# Patient Record
Sex: FEMALE | Race: WHITE | Employment: FULL TIME | URBAN - METROPOLITAN AREA
[De-identification: names, ages, dates, MRNs, and addresses within clinical notes are randomized per-mention and may not be internally consistent; named-entity substitution may affect disease eponyms.]

---

## 2017-02-04 LAB
AVERAGE GLUCOSE: NORMAL
CREATININE, URINE: NORMAL
HBA1C MFR BLD: 9.2 %
MICROALBUMIN/CREAT 24H UR: NORMAL MG/G{CREAT}
MICROALBUMIN/CREAT UR-RTO: NORMAL

## 2017-02-10 ENCOUNTER — OFFICE VISIT (OUTPATIENT)
Dept: PRIMARY CARE CLINIC | Age: 47
End: 2017-02-10

## 2017-02-10 VITALS
OXYGEN SATURATION: 97 % | BODY MASS INDEX: 51.91 KG/M2 | TEMPERATURE: 97.2 F | WEIGHT: 293 LBS | RESPIRATION RATE: 20 BRPM | HEART RATE: 102 BPM | HEIGHT: 63 IN | DIASTOLIC BLOOD PRESSURE: 80 MMHG | SYSTOLIC BLOOD PRESSURE: 150 MMHG

## 2017-02-10 DIAGNOSIS — J02.0 ACUTE STREPTOCOCCAL PHARYNGITIS: ICD-10-CM

## 2017-02-10 DIAGNOSIS — J30.2 SEASONAL ALLERGIC RHINITIS, UNSPECIFIED ALLERGIC RHINITIS TRIGGER: ICD-10-CM

## 2017-02-10 DIAGNOSIS — K58.2 IRRITABLE BOWEL SYNDROME WITH BOTH CONSTIPATION AND DIARRHEA: ICD-10-CM

## 2017-02-10 LAB — GLUCOSE BLD-MCNC: 345 MG/DL

## 2017-02-10 PROCEDURE — 99203 OFFICE O/P NEW LOW 30 MIN: CPT | Performed by: FAMILY MEDICINE

## 2017-02-10 PROCEDURE — 82962 GLUCOSE BLOOD TEST: CPT | Performed by: FAMILY MEDICINE

## 2017-02-10 RX ORDER — CELECOXIB 200 MG/1
CAPSULE ORAL
Refills: 1 | COMMUNITY
Start: 2016-12-15 | End: 2017-02-10 | Stop reason: SDUPTHER

## 2017-02-10 RX ORDER — DICYCLOMINE HYDROCHLORIDE 10 MG/1
10 CAPSULE ORAL 4 TIMES DAILY
Qty: 120 CAPSULE | Refills: 1 | Status: SHIPPED | OUTPATIENT
Start: 2017-02-10 | End: 2017-09-08 | Stop reason: SDUPTHER

## 2017-02-10 RX ORDER — FLUTICASONE PROPIONATE 50 MCG
1 SPRAY, SUSPENSION (ML) NASAL DAILY
Qty: 1 BOTTLE | Refills: 3 | Status: SHIPPED | OUTPATIENT
Start: 2017-02-10 | End: 2017-09-08 | Stop reason: SDUPTHER

## 2017-02-10 RX ORDER — GLIMEPIRIDE 2 MG/1
TABLET ORAL
Qty: 180 TABLET | Refills: 1 | Status: SHIPPED | OUTPATIENT
Start: 2017-02-10 | End: 2017-09-08 | Stop reason: SDUPTHER

## 2017-02-10 RX ORDER — CELECOXIB 200 MG/1
CAPSULE ORAL
Qty: 90 CAPSULE | Refills: 1 | Status: SHIPPED | OUTPATIENT
Start: 2017-02-10 | End: 2017-09-08 | Stop reason: SDUPTHER

## 2017-02-10 RX ORDER — FUROSEMIDE 20 MG/1
20 TABLET ORAL DAILY
Qty: 90 TABLET | Refills: 1 | Status: SHIPPED | OUTPATIENT
Start: 2017-02-10 | End: 2017-02-22

## 2017-02-10 ASSESSMENT — PATIENT HEALTH QUESTIONNAIRE - PHQ9
2. FEELING DOWN, DEPRESSED OR HOPELESS: 0
SUM OF ALL RESPONSES TO PHQ9 QUESTIONS 1 & 2: 0
1. LITTLE INTEREST OR PLEASURE IN DOING THINGS: 0
SUM OF ALL RESPONSES TO PHQ QUESTIONS 1-9: 0

## 2017-02-10 ASSESSMENT — ENCOUNTER SYMPTOMS
SORE THROAT: 1
COUGH: 0
WHEEZING: 0
ABDOMINAL PAIN: 1
CHEST TIGHTNESS: 0
EYE REDNESS: 0
SHORTNESS OF BREATH: 0
RHINORRHEA: 0
SINUS PRESSURE: 0
VOMITING: 0
PHOTOPHOBIA: 0
RESPIRATORY NEGATIVE: 1
CONSTIPATION: 1
BACK PAIN: 0
DIARRHEA: 1
NAUSEA: 0
BLOOD IN STOOL: 0
EYE ITCHING: 0
EYES NEGATIVE: 1

## 2017-03-07 ENCOUNTER — TELEPHONE (OUTPATIENT)
Dept: PRIMARY CARE CLINIC | Age: 47
End: 2017-03-07

## 2017-03-14 ENCOUNTER — TELEPHONE (OUTPATIENT)
Dept: PRIMARY CARE CLINIC | Age: 47
End: 2017-03-14

## 2017-03-16 ENCOUNTER — OFFICE VISIT (OUTPATIENT)
Dept: PRIMARY CARE CLINIC | Age: 47
End: 2017-03-16

## 2017-03-16 VITALS
HEIGHT: 63 IN | BODY MASS INDEX: 51.91 KG/M2 | OXYGEN SATURATION: 98 % | SYSTOLIC BLOOD PRESSURE: 118 MMHG | DIASTOLIC BLOOD PRESSURE: 76 MMHG | WEIGHT: 293 LBS | TEMPERATURE: 98.1 F | HEART RATE: 103 BPM | RESPIRATION RATE: 16 BRPM

## 2017-03-16 DIAGNOSIS — E55.9 VITAMIN D DEFICIENCY: ICD-10-CM

## 2017-03-16 DIAGNOSIS — E78.2 MIXED HYPERLIPIDEMIA: ICD-10-CM

## 2017-03-16 DIAGNOSIS — E03.9 HYPOTHYROIDISM (ACQUIRED): ICD-10-CM

## 2017-03-16 PROCEDURE — 99214 OFFICE O/P EST MOD 30 MIN: CPT | Performed by: FAMILY MEDICINE

## 2017-03-16 ASSESSMENT — ENCOUNTER SYMPTOMS
DIARRHEA: 1
VISUAL CHANGE: 0
BLURRED VISION: 0

## 2017-03-17 LAB
CREATININE URINE: 70 MG/DL
MICROALBUMIN UR-MCNC: 8.6 MG/DL
MICROALBUMIN/CREAT UR-RTO: 122.9 MG/G (ref 0–30)

## 2017-09-08 ENCOUNTER — OFFICE VISIT (OUTPATIENT)
Dept: PRIMARY CARE CLINIC | Age: 47
End: 2017-09-08

## 2017-09-08 VITALS
HEIGHT: 63 IN | BODY MASS INDEX: 51.91 KG/M2 | WEIGHT: 293 LBS | RESPIRATION RATE: 16 BRPM | HEART RATE: 106 BPM | OXYGEN SATURATION: 97 % | TEMPERATURE: 97.4 F | SYSTOLIC BLOOD PRESSURE: 128 MMHG | DIASTOLIC BLOOD PRESSURE: 66 MMHG

## 2017-09-08 DIAGNOSIS — J30.2 SEASONAL ALLERGIC RHINITIS, UNSPECIFIED ALLERGIC RHINITIS TRIGGER: ICD-10-CM

## 2017-09-08 DIAGNOSIS — M25.561 ACUTE PAIN OF RIGHT KNEE: ICD-10-CM

## 2017-09-08 DIAGNOSIS — E78.5 DYSLIPIDEMIA: ICD-10-CM

## 2017-09-08 LAB — GLUCOSE BLD-MCNC: 316 MG/DL

## 2017-09-08 PROCEDURE — 99214 OFFICE O/P EST MOD 30 MIN: CPT | Performed by: FAMILY MEDICINE

## 2017-09-08 PROCEDURE — 82962 GLUCOSE BLOOD TEST: CPT | Performed by: FAMILY MEDICINE

## 2017-09-08 RX ORDER — GLIMEPIRIDE 2 MG/1
TABLET ORAL
Qty: 180 TABLET | Refills: 1 | Status: SHIPPED | OUTPATIENT
Start: 2017-09-08 | End: 2018-04-03

## 2017-09-08 RX ORDER — DICYCLOMINE HYDROCHLORIDE 10 MG/1
10 CAPSULE ORAL 4 TIMES DAILY
Qty: 120 CAPSULE | Refills: 1 | Status: SHIPPED | OUTPATIENT
Start: 2017-09-08 | End: 2017-09-08

## 2017-09-08 RX ORDER — FLUTICASONE PROPIONATE 50 MCG
1 SPRAY, SUSPENSION (ML) NASAL DAILY
Qty: 1 BOTTLE | Refills: 3 | Status: SHIPPED | OUTPATIENT
Start: 2017-09-08 | End: 2018-09-28 | Stop reason: SDUPTHER

## 2017-09-08 RX ORDER — CELECOXIB 200 MG/1
200 CAPSULE ORAL 2 TIMES DAILY
Qty: 180 CAPSULE | Refills: 1 | Status: SHIPPED | OUTPATIENT
Start: 2017-09-08 | End: 2018-06-25 | Stop reason: SDUPTHER

## 2017-09-08 ASSESSMENT — ENCOUNTER SYMPTOMS
WHEEZING: 0
SHORTNESS OF BREATH: 0
PHOTOPHOBIA: 0
EYES NEGATIVE: 1
GASTROINTESTINAL NEGATIVE: 1
ABDOMINAL PAIN: 0
RESPIRATORY NEGATIVE: 1
EYE ITCHING: 0
EYE REDNESS: 0
DIARRHEA: 0
CONSTIPATION: 0
BACK PAIN: 0

## 2017-09-09 DIAGNOSIS — E78.5 DYSLIPIDEMIA: ICD-10-CM

## 2017-09-09 LAB
CHOLESTEROL, TOTAL: 195 MG/DL (ref 0–199)
HBA1C MFR BLD: 10.6 % (ref 4.8–5.9)
HDLC SERPL-MCNC: 37 MG/DL (ref 40–59)
LDL CHOLESTEROL CALCULATED: 112 MG/DL (ref 0–129)
TRIGL SERPL-MCNC: 228 MG/DL (ref 0–200)

## 2017-09-13 ENCOUNTER — TELEPHONE (OUTPATIENT)
Dept: PRIMARY CARE CLINIC | Age: 47
End: 2017-09-13

## 2017-09-13 RX ORDER — HYOSCYAMINE SULFATE EXTENDED-RELEASE 0.38 MG/1
375 TABLET ORAL EVERY 12 HOURS PRN
Qty: 60 TABLET | Refills: 2 | Status: SHIPPED | OUTPATIENT
Start: 2017-09-13 | End: 2018-02-16 | Stop reason: SDUPTHER

## 2017-09-13 RX ORDER — DEXLANSOPRAZOLE 60 MG/1
60 CAPSULE, DELAYED RELEASE ORAL DAILY
Qty: 90 CAPSULE | Refills: 1 | Status: SHIPPED | OUTPATIENT
Start: 2017-09-13 | End: 2018-04-03

## 2017-09-15 ENCOUNTER — TELEPHONE (OUTPATIENT)
Dept: PRIMARY CARE CLINIC | Age: 47
End: 2017-09-15

## 2017-09-26 ENCOUNTER — TELEPHONE (OUTPATIENT)
Dept: PRIMARY CARE CLINIC | Age: 47
End: 2017-09-26

## 2017-09-26 NOTE — TELEPHONE ENCOUNTER
Her left knee is very swollen and painful just like her right knee and she is asking for a script to be fitted for a left knee brace? She will pick this up on Thursday morning. Please call when ready for  at [phone]. She wants to know if she needs an xray of the left knee?

## 2017-10-16 ENCOUNTER — TELEPHONE (OUTPATIENT)
Dept: PRIMARY CARE CLINIC | Age: 47
End: 2017-10-16

## 2017-10-16 NOTE — TELEPHONE ENCOUNTER
Received faxed FMLA stating it was incorrect. I called pt clarify dates. I thought the end date just needed changed from 2017 to 2018.

## 2018-02-13 ENCOUNTER — OFFICE VISIT (OUTPATIENT)
Dept: PRIMARY CARE CLINIC | Age: 48
End: 2018-02-13
Payer: COMMERCIAL

## 2018-02-13 VITALS
HEIGHT: 64 IN | OXYGEN SATURATION: 93 % | SYSTOLIC BLOOD PRESSURE: 122 MMHG | BODY MASS INDEX: 49.85 KG/M2 | RESPIRATION RATE: 14 BRPM | WEIGHT: 292 LBS | DIASTOLIC BLOOD PRESSURE: 72 MMHG | HEART RATE: 103 BPM | TEMPERATURE: 97.5 F

## 2018-02-13 DIAGNOSIS — H92.02 LEFT EAR PAIN: ICD-10-CM

## 2018-02-13 DIAGNOSIS — J06.9 UPPER RESPIRATORY TRACT INFECTION, UNSPECIFIED TYPE: Primary | ICD-10-CM

## 2018-02-13 PROCEDURE — 99213 OFFICE O/P EST LOW 20 MIN: CPT | Performed by: NURSE PRACTITIONER

## 2018-02-13 RX ORDER — CEFDINIR 300 MG/1
300 CAPSULE ORAL 2 TIMES DAILY
Qty: 20 CAPSULE | Refills: 0 | Status: SHIPPED | OUTPATIENT
Start: 2018-02-13 | End: 2018-02-23

## 2018-02-13 ASSESSMENT — ENCOUNTER SYMPTOMS
COUGH: 0
RHINORRHEA: 0
DIARRHEA: 0
SINUS PAIN: 0
SINUS PRESSURE: 1
VOMITING: 0
SORE THROAT: 1
WHEEZING: 0
SHORTNESS OF BREATH: 0

## 2018-02-13 ASSESSMENT — PATIENT HEALTH QUESTIONNAIRE - PHQ9
2. FEELING DOWN, DEPRESSED OR HOPELESS: 0
1. LITTLE INTEREST OR PLEASURE IN DOING THINGS: 0
SUM OF ALL RESPONSES TO PHQ QUESTIONS 1-9: 0
SUM OF ALL RESPONSES TO PHQ9 QUESTIONS 1 & 2: 0

## 2018-02-13 NOTE — PROGRESS NOTES
Subjective:      Patient ID: Alesia Love is a 56 y.o. female who presents today for:  Chief Complaint   Patient presents with    Otalgia     Pt c/o L ear pain, sore throat, PND. x 3 days       Otalgia    There is pain in the left ear. This is a new problem. The current episode started in the past 7 days. The problem occurs constantly. The problem has been unchanged. There has been no fever. Associated symptoms include headaches and a sore throat. Pertinent negatives include no coughing, diarrhea, ear discharge, hearing loss, rhinorrhea or vomiting. She has tried ear drops for the symptoms. The treatment provided no relief. Her past medical history is significant for a chronic ear infection. There is no history of hearing loss or a tympanostomy tube. Past Medical History:   Diagnosis Date    Diabetes ([facility] [address]     Gestational diabetes mellitus in pregnancy      Past Surgical History:   Procedure Laterality Date     SECTION      HERNIA REPAIR      HYSTERECTOMY  2006    MECKEL DIVERTICULUM EXCISION      TONSILLECTOMY       Family History   Problem Relation Age of Onset    Diabetes Mother     High Blood Pressure Mother     High Cholesterol Mother     Diabetes Father     High Blood Pressure Father     High Cholesterol Father      Social History     Social History    Marital status: Single     Spouse name: N/A    Number of children: N/A    Years of education: N/A     Occupational History    Not on file.      Social History Main Topics    Smoking status: Former Smoker     Quit date: 2/10/2010    Smokeless tobacco: Never Used    Alcohol use Yes      Comment: occ    Drug use: Unknown    Sexual activity: Not on file     Other Topics Concern    Not on file     Social History Narrative    No narrative on file     Current Outpatient Prescriptions on File Prior to Visit   Medication Sig Dispense Refill    hyoscyamine (LEVBID) 375 MCG extended release tablet Take 1 tablet by mouth every 12 hours as needed for Cramping 60 tablet 2    dexlansoprazole (DEXILANT) 60 MG CPDR delayed release capsule Take 1 capsule by mouth daily 90 capsule 1    fluticasone (FLONASE) 50 MCG/ACT nasal spray 1 spray by Nasal route daily 1 Bottle 3    glimepiride (AMARYL) 2 MG tablet One bid 180 tablet 1    metFORMIN (GLUCOPHAGE) 1000 MG tablet TAKE 1 TABLET TWICE A  tablet 1    SITagliptin (JANUVIA) 100 MG tablet One daily 90 tablet 1    celecoxib (CELEBREX) 200 MG capsule Take 1 capsule by mouth 2 times daily TAKE 1 CAPSULE BY MOUTH ONCE DAILY. 180 capsule 1    Elastic Bandages & Supports (KNEE BRACE) MISC 1 each by Does not apply route daily 1 each 0    glucose blood VI test strips (FREESTYLE LITE) strip Check twice daily 200 strip 3    diphenhydrAMINE (BENADRYL) 25 MG tablet Take 25 mg by mouth every 6 hours as needed for Itching.  Lancets 28G MISC Check twice daily 100 each 3     No current facility-administered medications on file prior to visit. Allergies:  Darvocet a500 [propoxyphene n-acetaminophen]; Demerol hcl [meperidine]; Percocet [oxycodone-acetaminophen]; and Percodan [oxycodone-aspirin]    Review of Systems   Constitutional: Negative for chills, fatigue and fever. HENT: Positive for congestion, ear pain, sinus pressure and sore throat. Negative for ear discharge, hearing loss, rhinorrhea and sinus pain. Respiratory: Negative for cough, shortness of breath and wheezing. Gastrointestinal: Negative for diarrhea and vomiting. Neurological: Positive for headaches. Objective:   /72 (Site: Left Arm, Position: Sitting, Cuff Size: Large Adult)   Pulse 103   Temp 97.5 °F (36.4 °C) (Tympanic)   Resp 14   Ht 5' 3.5\" (1.613 m)   Wt 292 lb (132.5 kg)   LMP 02/10/2006   SpO2 93%   BMI 50.91 kg/m²     Physical Exam   Constitutional: She is oriented to person, place, and time. She appears well-developed and well-nourished. HENT:   Head: Normocephalic.    Right Ear: Tympanic membrane, external ear and ear canal normal.   Left Ear: Tympanic membrane, external ear and ear canal normal.   Nose: Mucosal edema and rhinorrhea present. Mouth/Throat: Uvula is midline and mucous membranes are normal. No oropharyngeal exudate, posterior oropharyngeal edema, posterior oropharyngeal erythema or tonsillar abscesses. Eyes: Conjunctivae are normal. Right eye exhibits no discharge. Left eye exhibits no discharge. Neck: Normal range of motion. Cardiovascular: Normal rate, regular rhythm and normal heart sounds. Pulmonary/Chest: Effort normal and breath sounds normal. No accessory muscle usage. No respiratory distress. She has no decreased breath sounds. She has no wheezes. She has no rhonchi. She has no rales. Lymphadenopathy:     She has no cervical adenopathy. Neurological: She is alert and oriented to person, place, and time. Skin: Skin is warm and dry. Psychiatric: She has a normal mood and affect. Her behavior is normal.       Assessment:      1. Upper respiratory tract infection, unspecified type  cefdinir (OMNICEF) 300 MG capsule   2. Left ear pain         Plan:      No orders of the defined types were placed in this encounter. Orders Placed This Encounter   Medications    cefdinir (OMNICEF) 300 MG capsule     Sig: Take 1 capsule by mouth 2 times daily for 10 days     Dispense:  20 capsule     Refill:  0       Return if symptoms worsen or fail to improve, for reevaluation and further treatment with PCP. Encouraged increase in fluids and rest. Ibuprofen and tylenol as needed. Discussed otc comfort care. Reviewed with the patient: current clinical status, medications, activities and diet. Side effects, adverse effects of the medication prescribed today, as well as treatment plan/ rationale and result expectations have been discussed with the patient who expresses understanding and desires to proceed.     Close follow up to evaluate treatment results

## 2018-02-16 RX ORDER — HYOSCYAMINE SULFATE EXTENDED-RELEASE 0.38 MG/1
375 TABLET ORAL EVERY 12 HOURS PRN
Qty: 60 TABLET | Refills: 1 | Status: SHIPPED | OUTPATIENT
Start: 2018-02-16 | End: 2018-04-03 | Stop reason: SDUPTHER

## 2018-02-18 ENCOUNTER — OFFICE VISIT (OUTPATIENT)
Dept: PRIMARY CARE CLINIC | Age: 48
End: 2018-02-18
Payer: COMMERCIAL

## 2018-02-18 VITALS
SYSTOLIC BLOOD PRESSURE: 116 MMHG | BODY MASS INDEX: 50.02 KG/M2 | HEIGHT: 64 IN | TEMPERATURE: 97.5 F | HEART RATE: 90 BPM | RESPIRATION RATE: 16 BRPM | WEIGHT: 293 LBS | DIASTOLIC BLOOD PRESSURE: 72 MMHG | OXYGEN SATURATION: 95 %

## 2018-02-18 DIAGNOSIS — K52.9 CHRONIC DIARRHEA: Primary | ICD-10-CM

## 2018-02-18 PROCEDURE — 99212 OFFICE O/P EST SF 10 MIN: CPT | Performed by: PHYSICIAN ASSISTANT

## 2018-02-18 ASSESSMENT — ENCOUNTER SYMPTOMS
ABDOMINAL PAIN: 1
DIARRHEA: 1
COUGH: 0
SHORTNESS OF BREATH: 0

## 2018-02-18 NOTE — PROGRESS NOTES
tablet 1    metFORMIN (GLUCOPHAGE) 1000 MG tablet TAKE 1 TABLET TWICE A  tablet 1    SITagliptin (JANUVIA) 100 MG tablet One daily 90 tablet 1    celecoxib (CELEBREX) 200 MG capsule Take 1 capsule by mouth 2 times daily TAKE 1 CAPSULE BY MOUTH ONCE DAILY. 180 capsule 1    Elastic Bandages & Supports (KNEE BRACE) MISC 1 each by Does not apply route daily 1 each 0    glucose blood VI test strips (FREESTYLE LITE) strip Check twice daily 200 strip 3    diphenhydrAMINE (BENADRYL) 25 MG tablet Take 25 mg by mouth every 6 hours as needed for Itching.  Lancets 28G MISC Check twice daily 100 each 3     No current facility-administered medications for this visit. Review of Systems   Constitutional: Negative for chills and fever. Respiratory: Negative for cough and shortness of breath. Gastrointestinal: Positive for abdominal pain and diarrhea. Objective    Vitals:    02/18/18 1134   BP: 116/72   Site: Left Arm   Position: Sitting   Cuff Size: Large Adult   Pulse: 90   Resp: 16   Temp: 97.5 °F (36.4 °C)   TempSrc: Tympanic   SpO2: 95%   Weight: 295 lb (133.8 kg)   Height: 5' 3.5\" (1.613 m)       Physical Exam   Constitutional: She appears well-developed and well-nourished. No distress. HENT:   Head: Normocephalic and atraumatic. Right Ear: External ear normal.   Left Ear: External ear normal.   Nose: Nose normal.   Mouth/Throat: No oropharyngeal exudate. Eyes: Conjunctivae are normal. Right eye exhibits no discharge. Left eye exhibits no discharge. No scleral icterus. Cardiovascular: Normal rate, regular rhythm and normal heart sounds. Pulmonary/Chest: Effort normal and breath sounds normal. No stridor. Abdominal: She exhibits no shifting dullness, no distension, no pulsatile liver, no fluid wave, no abdominal bruit, no ascites, no pulsatile midline mass and no mass. Bowel sounds are increased. There is no rebound and no guarding.    Lymphadenopathy:     She has no cervical

## 2018-03-30 ENCOUNTER — TELEPHONE (OUTPATIENT)
Dept: PRIMARY CARE CLINIC | Age: 48
End: 2018-03-30

## 2018-03-31 RX ORDER — SITAGLIPTIN 100 MG/1
TABLET, FILM COATED ORAL
Qty: 90 TABLET | Refills: 1 | OUTPATIENT
Start: 2018-03-31

## 2018-04-03 ENCOUNTER — TELEPHONE (OUTPATIENT)
Dept: PRIMARY CARE CLINIC | Age: 48
End: 2018-04-03

## 2018-04-03 ENCOUNTER — OFFICE VISIT (OUTPATIENT)
Dept: PRIMARY CARE CLINIC | Age: 48
End: 2018-04-03
Payer: COMMERCIAL

## 2018-04-03 VITALS
WEIGHT: 293 LBS | BODY MASS INDEX: 50.02 KG/M2 | DIASTOLIC BLOOD PRESSURE: 82 MMHG | RESPIRATION RATE: 16 BRPM | SYSTOLIC BLOOD PRESSURE: 130 MMHG | TEMPERATURE: 98.1 F | OXYGEN SATURATION: 97 % | HEIGHT: 64 IN | HEART RATE: 92 BPM

## 2018-04-03 DIAGNOSIS — S61.412A LACERATION OF LEFT HAND WITHOUT FOREIGN BODY, INITIAL ENCOUNTER: ICD-10-CM

## 2018-04-03 DIAGNOSIS — K21.9 GASTROESOPHAGEAL REFLUX DISEASE WITHOUT ESOPHAGITIS: ICD-10-CM

## 2018-04-03 DIAGNOSIS — K58.2 IRRITABLE BOWEL SYNDROME WITH BOTH CONSTIPATION AND DIARRHEA: ICD-10-CM

## 2018-04-03 LAB — GLUCOSE BLD-MCNC: 435 MG/DL

## 2018-04-03 PROCEDURE — 90471 IMMUNIZATION ADMIN: CPT | Performed by: FAMILY MEDICINE

## 2018-04-03 PROCEDURE — 99214 OFFICE O/P EST MOD 30 MIN: CPT | Performed by: FAMILY MEDICINE

## 2018-04-03 PROCEDURE — 82962 GLUCOSE BLOOD TEST: CPT | Performed by: FAMILY MEDICINE

## 2018-04-03 PROCEDURE — 90715 TDAP VACCINE 7 YRS/> IM: CPT | Performed by: FAMILY MEDICINE

## 2018-04-03 RX ORDER — CLOBETASOL PROPIONATE 0.5 MG/G
CREAM TOPICAL
Qty: 60 G | Refills: 1 | Status: SHIPPED | OUTPATIENT
Start: 2018-04-03 | End: 2018-07-05

## 2018-04-03 RX ORDER — POLYETHYLENE GLYCOL 3350 17 G/17G
POWDER, FOR SOLUTION ORAL
Refills: 12 | COMMUNITY
Start: 2018-02-21

## 2018-04-03 RX ORDER — OMEPRAZOLE 20 MG/1
CAPSULE, DELAYED RELEASE ORAL
Refills: 12 | COMMUNITY
Start: 2018-03-31 | End: 2019-02-21 | Stop reason: SDUPTHER

## 2018-04-03 RX ORDER — CEPHALEXIN 500 MG/1
500 CAPSULE ORAL 3 TIMES DAILY
Qty: 39 CAPSULE | Refills: 0 | Status: SHIPPED | OUTPATIENT
Start: 2018-04-03 | End: 2018-07-05

## 2018-04-03 RX ORDER — HYOSCYAMINE SULFATE EXTENDED-RELEASE 0.38 MG/1
375 TABLET ORAL EVERY 12 HOURS PRN
Qty: 60 TABLET | Refills: 1 | Status: SHIPPED | OUTPATIENT
Start: 2018-04-03 | End: 2019-02-21 | Stop reason: SDUPTHER

## 2018-04-03 ASSESSMENT — ENCOUNTER SYMPTOMS
FACIAL SWELLING: 0
PHOTOPHOBIA: 0
BLURRED VISION: 0
DIARRHEA: 0
STRIDOR: 0
EYE DISCHARGE: 0
EYE REDNESS: 0
VISUAL CHANGE: 0
CHOKING: 0
NAUSEA: 0
CONSTIPATION: 0
CHEST TIGHTNESS: 0
WHEEZING: 0
COLOR CHANGE: 0
ABDOMINAL DISTENTION: 1
EYE PAIN: 0
ABDOMINAL PAIN: 0
APNEA: 0

## 2018-05-19 RX ORDER — HYOSCYAMINE SULFATE EXTENDED-RELEASE 0.38 MG/1
375 TABLET ORAL EVERY 12 HOURS PRN
Qty: 60 TABLET | Refills: 1 | Status: SHIPPED | OUTPATIENT
Start: 2018-05-19 | End: 2018-07-05

## 2018-06-25 DIAGNOSIS — M25.561 ACUTE PAIN OF RIGHT KNEE: ICD-10-CM

## 2018-06-25 RX ORDER — CELECOXIB 200 MG/1
CAPSULE ORAL
Qty: 180 CAPSULE | Refills: 1 | Status: SHIPPED | OUTPATIENT
Start: 2018-06-25 | End: 2018-12-26 | Stop reason: SDUPTHER

## 2018-07-05 ENCOUNTER — OFFICE VISIT (OUTPATIENT)
Dept: PRIMARY CARE CLINIC | Age: 48
End: 2018-07-05
Payer: COMMERCIAL

## 2018-07-05 VITALS
HEART RATE: 93 BPM | SYSTOLIC BLOOD PRESSURE: 130 MMHG | WEIGHT: 289.3 LBS | BODY MASS INDEX: 49.39 KG/M2 | OXYGEN SATURATION: 96 % | HEIGHT: 64 IN | TEMPERATURE: 98 F | DIASTOLIC BLOOD PRESSURE: 82 MMHG

## 2018-07-05 DIAGNOSIS — K52.9 CHRONIC DIARRHEA: ICD-10-CM

## 2018-07-05 DIAGNOSIS — L23.9 ALLERGIC DERMATITIS: ICD-10-CM

## 2018-07-05 LAB
CREATININE URINE: 93.4 MG/DL
GLUCOSE BLD-MCNC: 352 MG/DL
HBA1C MFR BLD: 9.5 %
MICROALBUMIN UR-MCNC: 12.7 MG/DL
MICROALBUMIN/CREAT UR-RTO: 136 MG/G (ref 0–30)

## 2018-07-05 PROCEDURE — 83036 HEMOGLOBIN GLYCOSYLATED A1C: CPT | Performed by: FAMILY MEDICINE

## 2018-07-05 PROCEDURE — 1036F TOBACCO NON-USER: CPT | Performed by: FAMILY MEDICINE

## 2018-07-05 PROCEDURE — G8417 CALC BMI ABV UP PARAM F/U: HCPCS | Performed by: FAMILY MEDICINE

## 2018-07-05 PROCEDURE — 99213 OFFICE O/P EST LOW 20 MIN: CPT | Performed by: FAMILY MEDICINE

## 2018-07-05 PROCEDURE — 2022F DILAT RTA XM EVC RTNOPTHY: CPT | Performed by: FAMILY MEDICINE

## 2018-07-05 PROCEDURE — 82962 GLUCOSE BLOOD TEST: CPT | Performed by: FAMILY MEDICINE

## 2018-07-05 PROCEDURE — G8427 DOCREV CUR MEDS BY ELIG CLIN: HCPCS | Performed by: FAMILY MEDICINE

## 2018-07-05 PROCEDURE — 3046F HEMOGLOBIN A1C LEVEL >9.0%: CPT | Performed by: FAMILY MEDICINE

## 2018-07-05 RX ORDER — CEPHALEXIN 500 MG/1
500 CAPSULE ORAL 3 TIMES DAILY
Qty: 30 CAPSULE | Refills: 1 | Status: SHIPPED | OUTPATIENT
Start: 2018-07-05 | End: 2018-07-26

## 2018-07-05 ASSESSMENT — ENCOUNTER SYMPTOMS
ABDOMINAL PAIN: 0
BACK PAIN: 0
GASTROINTESTINAL NEGATIVE: 1
PHOTOPHOBIA: 0
EYE REDNESS: 0
RESPIRATORY NEGATIVE: 1
EYE ITCHING: 0
DIARRHEA: 0
CONSTIPATION: 0
SHORTNESS OF BREATH: 0
EYES NEGATIVE: 1
VISUAL CHANGE: 0
WHEEZING: 0
BLURRED VISION: 0

## 2018-07-09 RX ORDER — DIPHENHYDRAMINE HCL 25 MG
TABLET ORAL
Qty: 100 TABLET | Refills: 0 | Status: SHIPPED | OUTPATIENT
Start: 2018-07-09 | End: 2018-07-19 | Stop reason: SDUPTHER

## 2018-07-19 RX ORDER — DIPHENHYDRAMINE HCL 25 MG
TABLET ORAL
Qty: 100 TABLET | Refills: 0 | Status: SHIPPED | OUTPATIENT
Start: 2018-07-19 | End: 2018-08-01 | Stop reason: SDUPTHER

## 2018-07-21 ENCOUNTER — OFFICE VISIT (OUTPATIENT)
Dept: PRIMARY CARE CLINIC | Age: 48
End: 2018-07-21
Payer: COMMERCIAL

## 2018-07-21 VITALS
BODY MASS INDEX: 49.17 KG/M2 | HEIGHT: 64 IN | WEIGHT: 288 LBS | DIASTOLIC BLOOD PRESSURE: 80 MMHG | HEART RATE: 76 BPM | SYSTOLIC BLOOD PRESSURE: 130 MMHG | OXYGEN SATURATION: 98 %

## 2018-07-21 DIAGNOSIS — B35.1 ONYCHOMYCOSIS: ICD-10-CM

## 2018-07-21 DIAGNOSIS — E66.01 MORBID OBESITY (HCC): Primary | ICD-10-CM

## 2018-07-21 LAB — GLUCOSE BLD-MCNC: 304 MG/DL

## 2018-07-21 PROCEDURE — 2022F DILAT RTA XM EVC RTNOPTHY: CPT | Performed by: INTERNAL MEDICINE

## 2018-07-21 PROCEDURE — 1036F TOBACCO NON-USER: CPT | Performed by: INTERNAL MEDICINE

## 2018-07-21 PROCEDURE — 3046F HEMOGLOBIN A1C LEVEL >9.0%: CPT | Performed by: INTERNAL MEDICINE

## 2018-07-21 PROCEDURE — G8427 DOCREV CUR MEDS BY ELIG CLIN: HCPCS | Performed by: INTERNAL MEDICINE

## 2018-07-21 PROCEDURE — G8417 CALC BMI ABV UP PARAM F/U: HCPCS | Performed by: INTERNAL MEDICINE

## 2018-07-21 PROCEDURE — 99203 OFFICE O/P NEW LOW 30 MIN: CPT | Performed by: INTERNAL MEDICINE

## 2018-07-21 PROCEDURE — 82962 GLUCOSE BLOOD TEST: CPT | Performed by: INTERNAL MEDICINE

## 2018-07-21 RX ORDER — LISINOPRIL 10 MG/1
10 TABLET ORAL DAILY
Qty: 30 TABLET | Refills: 3 | Status: SHIPPED | OUTPATIENT
Start: 2018-07-21 | End: 2018-11-28 | Stop reason: SDUPTHER

## 2018-07-21 RX ORDER — INSULIN LISPRO 100 [IU]/ML
INJECTION, SUSPENSION SUBCUTANEOUS
Qty: 10 PEN | Refills: 3 | Status: SHIPPED | OUTPATIENT
Start: 2018-07-21 | End: 2018-07-26 | Stop reason: SDUPTHER

## 2018-07-21 ASSESSMENT — ENCOUNTER SYMPTOMS: EYES NEGATIVE: 1

## 2018-07-21 NOTE — PROGRESS NOTES
Uncontrolled type 2 diabetes mellitus with complication, with long-term current use of insulin ([facility] [address]             Plan:      Orders Placed This Encounter   Procedures    Basic Metabolic Panel     Standing Status:   Future     Standing Expiration Date:   2019    Hemoglobin A1C     Standing Status:   Future     Standing Expiration Date:   2019    POCT Glucose    HM DIABETES FOOT EXAM     Orders Placed This Encounter   Medications    insulin lispro protamine & lispro (HUMALOG MIX 75/25 KWIKPEN) (75-25) 100 UNIT per ML SUPN injection pen     Si units twice a day     Dispense:  10 pen     Refill:  3    Insulin Pen Needle (NOVOFINE) 32G X 6 MM MISC     Sig: Bid     Dispense:  100 each     Refill:  3    lisinopril (PRINIVIL;ZESTRIL) 10 MG tablet     Sig: Take 1 tablet by mouth daily     Dispense:  30 tablet     Refill:  3     continue metformin and ozempic at current dose     Diabetes education provided today:  Goals for fasting glucose between 100-120 post prandial 120-200   More than 50 % of 30 min spend in pt education/counseling   Diabetic nephropathy: Kidney function, microalbumin as a sign of diabetic nephropathy. Stages of kidney disease. Nutrition as a mainstream of diabetes therapy. Wall about label reading. Managing high and low sugar readings. Rotation of sites for subcutaneous medication injection.     Medications Discontinued During This Encounter   Medication Reason    Dulaglutide (TRULICITY) 2.36 LR/3.5YX SOPN Therapy completed    SITagliptin (JANUVIA) 100 MG tablet Therapy completed

## 2018-07-26 ENCOUNTER — OFFICE VISIT (OUTPATIENT)
Dept: PRIMARY CARE CLINIC | Age: 48
End: 2018-07-26
Payer: COMMERCIAL

## 2018-07-26 VITALS
WEIGHT: 287 LBS | BODY MASS INDEX: 49 KG/M2 | SYSTOLIC BLOOD PRESSURE: 130 MMHG | HEIGHT: 64 IN | RESPIRATION RATE: 16 BRPM | TEMPERATURE: 98 F | HEART RATE: 60 BPM | OXYGEN SATURATION: 95 % | DIASTOLIC BLOOD PRESSURE: 80 MMHG

## 2018-07-26 DIAGNOSIS — E66.01 MORBID OBESITY (HCC): ICD-10-CM

## 2018-07-26 LAB — GLUCOSE BLD-MCNC: 212 MG/DL

## 2018-07-26 PROCEDURE — 99214 OFFICE O/P EST MOD 30 MIN: CPT | Performed by: FAMILY MEDICINE

## 2018-07-26 PROCEDURE — G8427 DOCREV CUR MEDS BY ELIG CLIN: HCPCS | Performed by: FAMILY MEDICINE

## 2018-07-26 PROCEDURE — 82962 GLUCOSE BLOOD TEST: CPT | Performed by: FAMILY MEDICINE

## 2018-07-26 PROCEDURE — 2022F DILAT RTA XM EVC RTNOPTHY: CPT | Performed by: FAMILY MEDICINE

## 2018-07-26 PROCEDURE — G8417 CALC BMI ABV UP PARAM F/U: HCPCS | Performed by: FAMILY MEDICINE

## 2018-07-26 PROCEDURE — 1036F TOBACCO NON-USER: CPT | Performed by: FAMILY MEDICINE

## 2018-07-26 PROCEDURE — 3046F HEMOGLOBIN A1C LEVEL >9.0%: CPT | Performed by: FAMILY MEDICINE

## 2018-07-26 RX ORDER — GLIMEPIRIDE 2 MG/1
TABLET ORAL
Refills: 1 | COMMUNITY
Start: 2018-07-05 | End: 2018-07-26

## 2018-07-26 RX ORDER — INSULIN LISPRO 100 [IU]/ML
INJECTION, SUSPENSION SUBCUTANEOUS
Qty: 2 PEN | Refills: 0 | COMMUNITY
Start: 2018-07-26 | End: 2019-02-21 | Stop reason: SDUPTHER

## 2018-07-26 ASSESSMENT — ENCOUNTER SYMPTOMS
SINUS PRESSURE: 0
ABDOMINAL PAIN: 1
BACK PAIN: 0
COUGH: 0
BLURRED VISION: 0
RESPIRATORY NEGATIVE: 1
VISUAL CHANGE: 0
SORE THROAT: 0
SHORTNESS OF BREATH: 0
CONSTIPATION: 0
WHEEZING: 0
VOMITING: 0
DIARRHEA: 1
NAUSEA: 0

## 2018-07-26 NOTE — PROGRESS NOTES
Pressure Father     High Cholesterol Father      Social History     Social History    Marital status: Single     Spouse name: N/A    Number of children: N/A    Years of education: N/A     Occupational History    Not on file. Social History Main Topics    Smoking status: Former Smoker     Quit date: 2/10/2010    Smokeless tobacco: Never Used    Alcohol use Yes      Comment: occ    Drug use: Unknown    Sexual activity: Not on file     Other Topics Concern    Not on file     Social History Narrative    No narrative on file     Allergies:  Darvocet a500 [propoxyphene n-acetaminophen]; Demerol hcl [meperidine]; Percocet [oxycodone-acetaminophen]; and Percodan [oxycodone-aspirin]    Review of Systems   Constitutional: Negative for chills, fatigue, fever and weight loss. HENT: Negative for congestion, ear pain, sinus pressure and sore throat. Eyes: Negative for blurred vision. Respiratory: Negative. Negative for cough, shortness of breath and wheezing. Cardiovascular: Negative for chest pain and palpitations. Gastrointestinal: Positive for abdominal pain and diarrhea. Negative for constipation, nausea and vomiting. Endocrine: Negative for polydipsia, polyphagia and polyuria. Musculoskeletal: Negative for back pain and neck pain. Neurological: Negative for dizziness, weakness and headaches. Objective:   /80 (Site: Left Arm, Position: Sitting, Cuff Size: Large Adult)   Pulse 60   Temp 98 °F (36.7 °C) (Oral)   Resp 16   Ht 5' 3.5\" (1.613 m)   Wt 287 lb (130.2 kg)   LMP 02/10/2006   SpO2 95%   BMI 50.04 kg/m²     Physical Exam   Constitutional: She is oriented to person, place, and time. She appears well-developed and well-nourished. HENT:   Head: Normocephalic and atraumatic. Eyes: Conjunctivae and EOM are normal. Pupils are equal, round, and reactive to light. Neck: Normal range of motion. Neck supple. No thyromegaly present.    Cardiovascular: Normal rate, regular

## 2018-07-27 ENCOUNTER — CARE COORDINATION (OUTPATIENT)
Dept: CARE COORDINATION | Age: 48
End: 2018-07-27

## 2018-07-27 NOTE — CARE COORDINATION
Initial psychosocial assessment of patient as per request of RNCC  Location: Telephone  Time: 0910  Present in addition to SW and patient was: None     Presenting problem(s) as per referral: Food   Presenting problem(s) as per patient:  Food    Mental status: Alert and orientated   Medical condition(s): DM type 2, chronic diarrhea, morbid obesity    Substance Abuse/Chemical Dependency: None   Current provider(s) of behavioral health care to patient: None at this time  Past history of behavioral health care:  N/A  Current psychotropic med(s) prescribed to patient [note prescribing physician(s)]: N/A  No Suicide Contract: No    Household composition:  Patient and 15year old daughter  Household income and source(s): Income varies-- patient works as a  at [employer]. She stated when she is working full time income is around $1400. However, due to health conditions, patient has only been working part-time. Part- time income is around $600.00  Details of notable consumer and/or medical debt:  None at this time   Significant non-household relationships: Sisters, brother    Physical Environment of Household: Patient lives in two bedroom apartment with her daughter. The patient is currently engaged with/recieving services from the following community entities:  None at this time   The patient has a reliable form of transportation (explain as needed):  Yes    The patient has affordable, adequate housing (explain as needed):  No- Patient pays $750 in rent-- she stated her landlord has allowed her to split up rent payments  Details of patient's health insurance coverage, if any:   Patient stated she was recently approved for Medicaid. Patient is currently receiving the following financial assistance with medical costs (explain as needed) :  N/A        Summary: Patient is currently working part-time as a  at [employer]-- she stated, these days, her monthly income is around $600.00.  She lives in an apartment in [facility] and is raising her 15year old daughter. Patient states her biggest problem is affording food for her and her daughter-- she stated her daughter does receive free meals when she is in school. Patient stated she is planning to go to [employer] Monday to apply for [employer]. Plan for initial follow up (include referrals made if any):  Plan to send patient food resources: [employer]/ [address], Food Pantries, List of agencies that provide [employer], [address]. Patient requested this information be sent via email ([email or web address])  Plan to send community resource guide via mail   Plan to send utility help resources via mail       1013    Unable to send patient information via email. Placed call to patient -- she stated she went to food bank near home and director \"loaded her up\" with food. No immediate need at this time. Plan to send information via mail.

## 2018-07-30 RX ORDER — DIPHENHYDRAMINE HCL 25 MG
CAPSULE ORAL
Qty: 100 CAPSULE | Refills: 0 | OUTPATIENT
Start: 2018-07-30

## 2018-08-01 RX ORDER — GLIMEPIRIDE 2 MG/1
TABLET ORAL
Qty: 180 TABLET | Refills: 1 | Status: SHIPPED | OUTPATIENT
Start: 2018-08-01 | End: 2018-09-05

## 2018-08-02 RX ORDER — DIPHENHYDRAMINE HCL 25 MG
CAPSULE ORAL
Qty: 100 CAPSULE | Refills: 2 | Status: SHIPPED | OUTPATIENT
Start: 2018-08-02 | End: 2018-08-13 | Stop reason: SDUPTHER

## 2018-08-10 ENCOUNTER — CARE COORDINATION (OUTPATIENT)
Dept: CARE COORDINATION | Age: 48
End: 2018-08-10

## 2018-08-13 RX ORDER — DIPHENHYDRAMINE HCL 25 MG
CAPSULE ORAL
Qty: 180 CAPSULE | Refills: 0 | Status: SHIPPED | OUTPATIENT
Start: 2018-08-13 | End: 2018-09-05 | Stop reason: SDUPTHER

## 2018-09-05 ENCOUNTER — OFFICE VISIT (OUTPATIENT)
Dept: PRIMARY CARE CLINIC | Age: 48
End: 2018-09-05
Payer: COMMERCIAL

## 2018-09-05 VITALS
SYSTOLIC BLOOD PRESSURE: 128 MMHG | OXYGEN SATURATION: 98 % | TEMPERATURE: 98.1 F | WEIGHT: 292 LBS | BODY MASS INDEX: 49.85 KG/M2 | RESPIRATION RATE: 14 BRPM | HEART RATE: 90 BPM | HEIGHT: 64 IN | DIASTOLIC BLOOD PRESSURE: 80 MMHG

## 2018-09-05 DIAGNOSIS — M25.40 JOINT SWELLING: ICD-10-CM

## 2018-09-05 DIAGNOSIS — E66.01 MORBID OBESITY (HCC): ICD-10-CM

## 2018-09-05 DIAGNOSIS — E78.5 DYSLIPIDEMIA: ICD-10-CM

## 2018-09-05 DIAGNOSIS — K52.9 CHRONIC DIARRHEA: ICD-10-CM

## 2018-09-05 DIAGNOSIS — R53.81 MALAISE AND FATIGUE: ICD-10-CM

## 2018-09-05 DIAGNOSIS — L30.9 ECZEMA, UNSPECIFIED TYPE: ICD-10-CM

## 2018-09-05 DIAGNOSIS — J30.1 SEASONAL ALLERGIC RHINITIS DUE TO POLLEN: ICD-10-CM

## 2018-09-05 DIAGNOSIS — R53.83 MALAISE AND FATIGUE: ICD-10-CM

## 2018-09-05 PROCEDURE — G8417 CALC BMI ABV UP PARAM F/U: HCPCS | Performed by: FAMILY MEDICINE

## 2018-09-05 PROCEDURE — 3046F HEMOGLOBIN A1C LEVEL >9.0%: CPT | Performed by: FAMILY MEDICINE

## 2018-09-05 PROCEDURE — 1036F TOBACCO NON-USER: CPT | Performed by: FAMILY MEDICINE

## 2018-09-05 PROCEDURE — G8427 DOCREV CUR MEDS BY ELIG CLIN: HCPCS | Performed by: FAMILY MEDICINE

## 2018-09-05 PROCEDURE — 99214 OFFICE O/P EST MOD 30 MIN: CPT | Performed by: FAMILY MEDICINE

## 2018-09-05 PROCEDURE — 2022F DILAT RTA XM EVC RTNOPTHY: CPT | Performed by: FAMILY MEDICINE

## 2018-09-05 RX ORDER — FLUTICASONE PROPIONATE 50 MCG
1 SPRAY, SUSPENSION (ML) NASAL DAILY
Qty: 1 BOTTLE | Refills: 3 | Status: SHIPPED | OUTPATIENT
Start: 2018-09-05 | End: 2018-09-08 | Stop reason: SDUPTHER

## 2018-09-05 RX ORDER — DIPHENHYDRAMINE HCL 25 MG
CAPSULE ORAL
Qty: 180 CAPSULE | Refills: 1 | Status: SHIPPED | OUTPATIENT
Start: 2018-09-05 | End: 2019-02-07 | Stop reason: SDUPTHER

## 2018-09-05 ASSESSMENT — ENCOUNTER SYMPTOMS
DIARRHEA: 0
RESPIRATORY NEGATIVE: 1
EYES NEGATIVE: 1
ABDOMINAL PAIN: 0
EYE ITCHING: 0
WHEEZING: 0
SHORTNESS OF BREATH: 0
PHOTOPHOBIA: 0
EYE REDNESS: 0
CONSTIPATION: 0
BACK PAIN: 0
GASTROINTESTINAL NEGATIVE: 1

## 2018-09-05 NOTE — PROGRESS NOTES
Subjective:      Patient ID: Antionette Solis is a 53 y.o. female who presents today for:  Chief Complaint   Patient presents with    Diabetes     pt f/u for humalog 75/25 . pt states she does have swelling in hands, legs and feet. pt glucose 205 today pt did start taking the januvia 10 mg daily pt states she was taken off but noticed her glucose was not taking it down.  Allergies     pt requesting a new prescription for benadryl 25mg every 4 hrs. Diabetes   She presents for her follow-up diabetic visit. She has type 2 diabetes mellitus. Her disease course has been stable. There are no hypoglycemic associated symptoms. Pertinent negatives for hypoglycemia include no nervousness/anxiousness. There are no diabetic associated symptoms. Pertinent negatives for diabetes include no fatigue. There are no hypoglycemic complications. Symptoms are stable. There are no diabetic complications. Risk factors for coronary artery disease include diabetes mellitus. Current diabetic treatments: humalog, januvia, amaryl. She is following a generally healthy and high fiber diet. Allergies  Pt presents today for allergies. She is currently taking Benadryl 24mg Q4H with moderate relief. Past Medical History:   Diagnosis Date    Diabetes ([address]     Gestational diabetes mellitus in pregnancy      Past Surgical History:   Procedure Laterality Date     SECTION      HERNIA REPAIR      HYSTERECTOMY  2006    MECKEL DIVERTICULUM EXCISION      TONSILLECTOMY       Family History   Problem Relation Age of Onset    Diabetes Mother     High Blood Pressure Mother     High Cholesterol Mother     Diabetes Father     High Blood Pressure Father     High Cholesterol Father      Social History     Social History    Marital status: Single     Spouse name: N/A    Number of children: N/A    Years of education: N/A     Occupational History    Not on file.      Social History Main Topics    Smoking status: Former by Nasal route daily     Dispense:  1 Bottle     Refill:  3       Controlled Substances Monitoring:      Return in about 3 months (around 12/5/2018) for Review of Labs & Diagnostic Testing, Routine follow up. RUBEN Martin, [facility]  , am scribing for and in the presence of Ale Silva DO. Electronically signed by :  RUBEN Gimenez, [facility], Ale Silva DO, personally performed the services described in this documentation, as scribed by Sabina Artis in my presence, and it is both accurate and complete.  Electronically signed by: Ale Silva DO    9/9/18 8:55 PM    Ale Silva DO

## 2018-09-08 ENCOUNTER — OFFICE VISIT (OUTPATIENT)
Dept: PRIMARY CARE CLINIC | Age: 48
End: 2018-09-08
Payer: COMMERCIAL

## 2018-09-08 VITALS
HEIGHT: 64 IN | DIASTOLIC BLOOD PRESSURE: 84 MMHG | WEIGHT: 290 LBS | SYSTOLIC BLOOD PRESSURE: 136 MMHG | HEART RATE: 84 BPM | BODY MASS INDEX: 49.51 KG/M2

## 2018-09-08 LAB — GLUCOSE BLD-MCNC: 313 MG/DL

## 2018-09-08 PROCEDURE — G8427 DOCREV CUR MEDS BY ELIG CLIN: HCPCS | Performed by: INTERNAL MEDICINE

## 2018-09-08 PROCEDURE — 1036F TOBACCO NON-USER: CPT | Performed by: INTERNAL MEDICINE

## 2018-09-08 PROCEDURE — G8417 CALC BMI ABV UP PARAM F/U: HCPCS | Performed by: INTERNAL MEDICINE

## 2018-09-08 PROCEDURE — 2022F DILAT RTA XM EVC RTNOPTHY: CPT | Performed by: INTERNAL MEDICINE

## 2018-09-08 PROCEDURE — 82962 GLUCOSE BLOOD TEST: CPT | Performed by: INTERNAL MEDICINE

## 2018-09-08 PROCEDURE — 3046F HEMOGLOBIN A1C LEVEL >9.0%: CPT | Performed by: INTERNAL MEDICINE

## 2018-09-08 PROCEDURE — 99213 OFFICE O/P EST LOW 20 MIN: CPT | Performed by: INTERNAL MEDICINE

## 2018-09-08 RX ORDER — INSULIN LISPRO 100 [IU]/ML
INJECTION, SUSPENSION SUBCUTANEOUS
Qty: 15 PEN | Refills: 3
Start: 2018-09-08 | End: 2019-03-01 | Stop reason: SDUPTHER

## 2018-09-09 RX ORDER — CLOBETASOL PROPIONATE 0.5 MG/G
CREAM TOPICAL
Qty: 60 G | Refills: 1 | Status: SHIPPED | OUTPATIENT
Start: 2018-09-09

## 2018-09-13 ENCOUNTER — CARE COORDINATION (OUTPATIENT)
Dept: CARE COORDINATION | Age: 48
End: 2018-09-13

## 2018-09-13 NOTE — CARE COORDINATION
Attempted to contact patient to discuss care coordination/ social work. Unable to reach patient by phone. Left voicemail requesting return call.

## 2018-09-13 NOTE — CARE COORDINATION
Received voicemail from Sierra Washburn who states she is managing well at home. Patient states the resources I sent her have been very helpful and she has been able to utilize many of them. Patient did not express any new needs or concerns.

## 2018-09-19 ENCOUNTER — TELEPHONE (OUTPATIENT)
Dept: PRIMARY CARE CLINIC | Age: 48
End: 2018-09-19

## 2018-09-25 RX ORDER — CEPHALEXIN 500 MG/1
CAPSULE ORAL
Qty: 30 CAPSULE | Refills: 1 | OUTPATIENT
Start: 2018-09-25

## 2018-09-28 DIAGNOSIS — J30.2 SEASONAL ALLERGIC RHINITIS: ICD-10-CM

## 2018-09-28 RX ORDER — HYOSCYAMINE SULFATE EXTENDED-RELEASE 0.38 MG/1
375 TABLET ORAL EVERY 12 HOURS PRN
Qty: 60 TABLET | Refills: 1 | Status: SHIPPED | OUTPATIENT
Start: 2018-09-28 | End: 2018-11-28 | Stop reason: SDUPTHER

## 2018-09-28 RX ORDER — FLUTICASONE PROPIONATE 50 MCG
SPRAY, SUSPENSION (ML) NASAL
Qty: 1 BOTTLE | Refills: 1 | Status: SHIPPED | OUTPATIENT
Start: 2018-09-28

## 2018-10-04 LAB
ANION GAP SERPL CALCULATED.3IONS-SCNC: 15 MEQ/L (ref 7–13)
BUN BLDV-MCNC: 14 MG/DL (ref 6–20)
CALCIUM SERPL-MCNC: 9 MG/DL (ref 8.6–10.2)
CHLORIDE BLD-SCNC: 97 MEQ/L (ref 98–107)
CO2: 24 MEQ/L (ref 22–29)
CREAT SERPL-MCNC: 0.56 MG/DL (ref 0.5–0.9)
GFR AFRICAN AMERICAN: >60
GFR NON-AFRICAN AMERICAN: >60
GLUCOSE BLD-MCNC: 229 MG/DL (ref 74–109)
HBA1C MFR BLD: 10.2 % (ref 4.8–5.9)
POTASSIUM SERPL-SCNC: 4.4 MEQ/L (ref 3.5–5.1)
SODIUM BLD-SCNC: 136 MEQ/L (ref 132–144)

## 2018-10-11 ENCOUNTER — CARE COORDINATION (OUTPATIENT)
Dept: CARE COORDINATION | Age: 48
End: 2018-10-11

## 2018-11-24 ENCOUNTER — OFFICE VISIT (OUTPATIENT)
Dept: PRIMARY CARE CLINIC | Age: 48
End: 2018-11-24
Payer: COMMERCIAL

## 2018-11-24 VITALS
DIASTOLIC BLOOD PRESSURE: 72 MMHG | HEART RATE: 106 BPM | WEIGHT: 289 LBS | HEIGHT: 63 IN | TEMPERATURE: 98.5 F | OXYGEN SATURATION: 98 % | RESPIRATION RATE: 16 BRPM | SYSTOLIC BLOOD PRESSURE: 124 MMHG | BODY MASS INDEX: 51.21 KG/M2

## 2018-11-24 DIAGNOSIS — J01.00 ACUTE NON-RECURRENT MAXILLARY SINUSITIS: Primary | ICD-10-CM

## 2018-11-24 PROCEDURE — G8484 FLU IMMUNIZE NO ADMIN: HCPCS | Performed by: PHYSICIAN ASSISTANT

## 2018-11-24 PROCEDURE — 99213 OFFICE O/P EST LOW 20 MIN: CPT | Performed by: PHYSICIAN ASSISTANT

## 2018-11-24 PROCEDURE — 1036F TOBACCO NON-USER: CPT | Performed by: PHYSICIAN ASSISTANT

## 2018-11-24 PROCEDURE — G8417 CALC BMI ABV UP PARAM F/U: HCPCS | Performed by: PHYSICIAN ASSISTANT

## 2018-11-24 PROCEDURE — G8427 DOCREV CUR MEDS BY ELIG CLIN: HCPCS | Performed by: PHYSICIAN ASSISTANT

## 2018-11-24 RX ORDER — CEFDINIR 300 MG/1
300 CAPSULE ORAL 2 TIMES DAILY
Qty: 20 CAPSULE | Refills: 0 | Status: SHIPPED | OUTPATIENT
Start: 2018-11-24 | End: 2018-12-04

## 2018-11-24 ASSESSMENT — ENCOUNTER SYMPTOMS
ABDOMINAL PAIN: 0
WHEEZING: 1
DIARRHEA: 0
SORE THROAT: 0
SWOLLEN GLANDS: 0
COUGH: 1
VOMITING: 0

## 2018-11-24 NOTE — PROGRESS NOTES
She appears well-developed and well-nourished. No distress. HENT:   Head: Normocephalic and atraumatic. Right Ear: External ear and ear canal normal. Tympanic membrane is not erythematous. Left Ear: External ear and ear canal normal. Tympanic membrane is not erythematous. Nose: Mucosal edema present. No rhinorrhea. Right sinus exhibits maxillary sinus tenderness. Right sinus exhibits no frontal sinus tenderness. Left sinus exhibits maxillary sinus tenderness. Left sinus exhibits no frontal sinus tenderness. Mouth/Throat: Posterior oropharyngeal erythema present. No oropharyngeal exudate. Eyes: Pupils are equal, round, and reactive to light. Conjunctivae and EOM are normal. Right eye exhibits no discharge. Left eye exhibits no discharge. No scleral icterus. Neck: No tracheal deviation present. Cardiovascular: Normal rate, regular rhythm and normal heart sounds. Pulmonary/Chest: Effort normal and breath sounds normal. No stridor. No respiratory distress. She has no wheezes. She has no rales. She exhibits no tenderness. Lymphadenopathy:     She has no cervical adenopathy. Neurological: She is alert. No cranial nerve deficit. Skin: Skin is warm and dry. No rash noted. No erythema. No pallor. Psychiatric: She has a normal mood and affect. Her behavior is normal.   Vitals reviewed. Assessment and Plan      ICD-10-CM    1. Acute non-recurrent maxillary sinusitis J01.00        Orders Placed This Encounter   Medications    cefdinir (OMNICEF) 300 MG capsule     Sig: Take 1 capsule by mouth 2 times daily for 10 days     Dispense:  20 capsule     Refill:  0       Reviewed with the patient: current clinicalstatus, medications, activities and diet. Side effects, adverse effects of the medication prescribed today, as well as treatment plan and result expectations have been discussed with the patient who expressesunderstanding and desires to proceed.     Close follow up to evaluate treatment results and for coordination of care. I have reviewed the patient's medical history in detail and updated the computerized patientrecord. Return if symptoms worsen or fail to improve, for follow up with PCP.     VICTOR HUGO Ann

## 2018-11-28 RX ORDER — LISINOPRIL 10 MG/1
TABLET ORAL
Qty: 30 TABLET | Refills: 3 | Status: SHIPPED | OUTPATIENT
Start: 2018-11-28 | End: 2019-02-21 | Stop reason: SDUPTHER

## 2018-11-28 RX ORDER — HYOSCYAMINE SULFATE EXTENDED-RELEASE 0.38 MG/1
375 TABLET ORAL EVERY 12 HOURS PRN
Qty: 60 TABLET | Refills: 1 | Status: SHIPPED | OUTPATIENT
Start: 2018-11-28 | End: 2019-01-31 | Stop reason: SDUPTHER

## 2018-11-30 ENCOUNTER — OFFICE VISIT (OUTPATIENT)
Dept: PRIMARY CARE CLINIC | Age: 48
End: 2018-11-30
Payer: COMMERCIAL

## 2018-11-30 VITALS
OXYGEN SATURATION: 93 % | RESPIRATION RATE: 16 BRPM | SYSTOLIC BLOOD PRESSURE: 118 MMHG | DIASTOLIC BLOOD PRESSURE: 78 MMHG | BODY MASS INDEX: 51.35 KG/M2 | WEIGHT: 289.8 LBS | TEMPERATURE: 97.7 F | HEART RATE: 88 BPM | HEIGHT: 63 IN

## 2018-11-30 DIAGNOSIS — R06.2 WHEEZING: ICD-10-CM

## 2018-11-30 DIAGNOSIS — R05.9 COUGH: Primary | ICD-10-CM

## 2018-11-30 PROCEDURE — G8417 CALC BMI ABV UP PARAM F/U: HCPCS | Performed by: PHYSICIAN ASSISTANT

## 2018-11-30 PROCEDURE — 1036F TOBACCO NON-USER: CPT | Performed by: PHYSICIAN ASSISTANT

## 2018-11-30 PROCEDURE — G8484 FLU IMMUNIZE NO ADMIN: HCPCS | Performed by: PHYSICIAN ASSISTANT

## 2018-11-30 PROCEDURE — 99213 OFFICE O/P EST LOW 20 MIN: CPT | Performed by: PHYSICIAN ASSISTANT

## 2018-11-30 PROCEDURE — G8427 DOCREV CUR MEDS BY ELIG CLIN: HCPCS | Performed by: PHYSICIAN ASSISTANT

## 2018-11-30 RX ORDER — LEVOFLOXACIN 500 MG/1
500 TABLET, FILM COATED ORAL DAILY
Qty: 5 TABLET | Refills: 0 | Status: CANCELLED
Start: 2018-11-30 | End: 2018-12-05

## 2018-11-30 RX ORDER — ALBUTEROL SULFATE 90 UG/1
2 AEROSOL, METERED RESPIRATORY (INHALATION) EVERY 4 HOURS PRN
Qty: 1 INHALER | Refills: 0 | Status: SHIPPED | OUTPATIENT
Start: 2018-11-30

## 2018-11-30 RX ORDER — BENZONATATE 100 MG/1
100-200 CAPSULE ORAL 3 TIMES DAILY PRN
Qty: 60 CAPSULE | Refills: 0 | Status: SHIPPED | OUTPATIENT
Start: 2018-11-30 | End: 2018-12-07

## 2018-11-30 RX ORDER — ALBUTEROL SULFATE 2.5 MG/3ML
2.5 SOLUTION RESPIRATORY (INHALATION) EVERY 6 HOURS PRN
Qty: 20 EACH | Refills: 0 | Status: SHIPPED | OUTPATIENT
Start: 2018-11-30

## 2018-11-30 ASSESSMENT — ENCOUNTER SYMPTOMS
SORE THROAT: 0
COUGH: 1
WHEEZING: 1
STRIDOR: 0

## 2018-11-30 NOTE — LETTER
[address]  [id number] [place] [id number]  Phone: [phone]  Fax: [address], [place]        November 30, 2018     Patient: Edwina Toribio   YOB: 1970   Date of Visit: 11/30/2018       To Whom It May Concern: It is my medical opinion that Carmen Magaña may return to work on 12/3/18 and may be excused for missing 11/30/18. If you have any questions or concerns, please don't hesitate to call.     Sincerely,    VICTOR HUGO Samaniego

## 2019-01-31 RX ORDER — GLIMEPIRIDE 2 MG/1
TABLET ORAL
Qty: 60 TABLET | Refills: 1 | Status: SHIPPED | OUTPATIENT
Start: 2019-01-31 | End: 2019-02-21 | Stop reason: ALTCHOICE

## 2019-01-31 RX ORDER — HYOSCYAMINE SULFATE EXTENDED-RELEASE 0.38 MG/1
375 TABLET ORAL EVERY 12 HOURS PRN
Qty: 60 TABLET | Refills: 0 | Status: SHIPPED | OUTPATIENT
Start: 2019-01-31 | End: 2019-02-21 | Stop reason: SDUPTHER

## 2019-02-07 DIAGNOSIS — J30.1 SEASONAL ALLERGIC RHINITIS DUE TO POLLEN: ICD-10-CM

## 2019-02-08 RX ORDER — DIPHENHYDRAMINE HYDROCHLORIDE 25 MG/1
CAPSULE ORAL
Qty: 180 CAPSULE | Refills: 1 | Status: SHIPPED | OUTPATIENT
Start: 2019-02-08 | End: 2019-02-21 | Stop reason: SDUPTHER

## 2019-02-14 DIAGNOSIS — R53.83 MALAISE AND FATIGUE: ICD-10-CM

## 2019-02-14 DIAGNOSIS — E11.51 TYPE II DIABETES MELLITUS WITH PERIPHERAL CIRCULATORY DISORDER (HCC): Primary | ICD-10-CM

## 2019-02-14 DIAGNOSIS — E66.01 MORBID OBESITY (HCC): ICD-10-CM

## 2019-02-14 DIAGNOSIS — R53.81 MALAISE AND FATIGUE: ICD-10-CM

## 2019-02-14 DIAGNOSIS — E78.5 DYSLIPIDEMIA: ICD-10-CM

## 2019-02-14 LAB
ALBUMIN SERPL-MCNC: 4.4 G/DL (ref 3.5–4.6)
ALP BLD-CCNC: 125 U/L (ref 40–130)
ALT SERPL-CCNC: 21 U/L (ref 0–33)
ANION GAP SERPL CALCULATED.3IONS-SCNC: 14 MEQ/L (ref 9–15)
AST SERPL-CCNC: 20 U/L (ref 0–35)
BASOPHILS ABSOLUTE: 0.1 K/UL (ref 0–0.2)
BASOPHILS RELATIVE PERCENT: 0.6 %
BILIRUB SERPL-MCNC: 0.5 MG/DL (ref 0.2–0.7)
BUN BLDV-MCNC: 9 MG/DL (ref 6–20)
CALCIUM SERPL-MCNC: 9.4 MG/DL (ref 8.5–9.9)
CHLORIDE BLD-SCNC: 93 MEQ/L (ref 95–107)
CHOLESTEROL, TOTAL: 233 MG/DL (ref 0–199)
CO2: 26 MEQ/L (ref 20–31)
CREAT SERPL-MCNC: 0.7 MG/DL (ref 0.5–0.9)
EOSINOPHILS ABSOLUTE: 0.3 K/UL (ref 0–0.7)
EOSINOPHILS RELATIVE PERCENT: 1.7 %
GFR AFRICAN AMERICAN: >60
GFR NON-AFRICAN AMERICAN: >60
GLOBULIN: 3.3 G/DL (ref 2.3–3.5)
GLUCOSE BLD-MCNC: 330 MG/DL (ref 70–99)
HBA1C MFR BLD: 10.9 % (ref 4.8–5.9)
HCT VFR BLD CALC: 45.6 % (ref 37–47)
HDLC SERPL-MCNC: 36 MG/DL (ref 40–59)
HEMOGLOBIN: 14.6 G/DL (ref 12–16)
LDL CHOLESTEROL CALCULATED: 144 MG/DL (ref 0–129)
LYMPHOCYTES ABSOLUTE: 3.5 K/UL (ref 1–4.8)
LYMPHOCYTES RELATIVE PERCENT: 23.4 %
MCH RBC QN AUTO: 28 PG (ref 27–31.3)
MCHC RBC AUTO-ENTMCNC: 31.9 % (ref 33–37)
MCV RBC AUTO: 87.7 FL (ref 82–100)
MONOCYTES ABSOLUTE: 0.7 K/UL (ref 0.2–0.8)
MONOCYTES RELATIVE PERCENT: 4.7 %
NEUTROPHILS ABSOLUTE: 10.3 K/UL (ref 1.4–6.5)
NEUTROPHILS RELATIVE PERCENT: 69.6 %
PDW BLD-RTO: 14.4 % (ref 11.5–14.5)
PLATELET # BLD: 214 K/UL (ref 130–400)
POTASSIUM SERPL-SCNC: 4.8 MEQ/L (ref 3.4–4.9)
RBC # BLD: 5.2 M/UL (ref 4.2–5.4)
SODIUM BLD-SCNC: 133 MEQ/L (ref 135–144)
TOTAL PROTEIN: 7.7 G/DL (ref 6.3–8)
TRIGL SERPL-MCNC: 267 MG/DL (ref 0–150)
TSH SERPL DL<=0.05 MIU/L-ACNC: 2.31 UIU/ML (ref 0.44–3.86)
WBC # BLD: 14.8 K/UL (ref 4.8–10.8)

## 2019-02-21 ENCOUNTER — OFFICE VISIT (OUTPATIENT)
Dept: PRIMARY CARE CLINIC | Age: 49
End: 2019-02-21
Payer: COMMERCIAL

## 2019-02-21 VITALS
HEART RATE: 102 BPM | OXYGEN SATURATION: 95 % | DIASTOLIC BLOOD PRESSURE: 78 MMHG | RESPIRATION RATE: 14 BRPM | HEIGHT: 63 IN | SYSTOLIC BLOOD PRESSURE: 122 MMHG | BODY MASS INDEX: 51.38 KG/M2 | WEIGHT: 290 LBS | TEMPERATURE: 97.5 F

## 2019-02-21 DIAGNOSIS — J30.1 SEASONAL ALLERGIC RHINITIS DUE TO POLLEN: ICD-10-CM

## 2019-02-21 DIAGNOSIS — M25.561 ACUTE PAIN OF RIGHT KNEE: ICD-10-CM

## 2019-02-21 DIAGNOSIS — E78.2 COMBINED HYPERLIPIDEMIA: ICD-10-CM

## 2019-02-21 DIAGNOSIS — K58.2 IRRITABLE BOWEL SYNDROME WITH BOTH CONSTIPATION AND DIARRHEA: ICD-10-CM

## 2019-02-21 DIAGNOSIS — K21.9 GASTROESOPHAGEAL REFLUX DISEASE WITHOUT ESOPHAGITIS: ICD-10-CM

## 2019-02-21 PROCEDURE — 99214 OFFICE O/P EST MOD 30 MIN: CPT | Performed by: NURSE PRACTITIONER

## 2019-02-21 PROCEDURE — G8484 FLU IMMUNIZE NO ADMIN: HCPCS | Performed by: NURSE PRACTITIONER

## 2019-02-21 PROCEDURE — G8427 DOCREV CUR MEDS BY ELIG CLIN: HCPCS | Performed by: NURSE PRACTITIONER

## 2019-02-21 PROCEDURE — 1036F TOBACCO NON-USER: CPT | Performed by: NURSE PRACTITIONER

## 2019-02-21 PROCEDURE — 3046F HEMOGLOBIN A1C LEVEL >9.0%: CPT | Performed by: NURSE PRACTITIONER

## 2019-02-21 PROCEDURE — G8417 CALC BMI ABV UP PARAM F/U: HCPCS | Performed by: NURSE PRACTITIONER

## 2019-02-21 PROCEDURE — 2022F DILAT RTA XM EVC RTNOPTHY: CPT | Performed by: NURSE PRACTITIONER

## 2019-02-21 RX ORDER — OMEPRAZOLE 20 MG/1
20 CAPSULE, DELAYED RELEASE ORAL DAILY
Qty: 30 CAPSULE | Refills: 5 | Status: SHIPPED | OUTPATIENT
Start: 2019-02-21

## 2019-02-21 RX ORDER — SEMAGLUTIDE 1.34 MG/ML
INJECTION, SOLUTION SUBCUTANEOUS
Refills: 1 | COMMUNITY
Start: 2019-02-06 | End: 2019-02-21 | Stop reason: SDUPTHER

## 2019-02-21 RX ORDER — DIPHENHYDRAMINE HCL 25 MG
50 CAPSULE ORAL EVERY 4 HOURS PRN
Qty: 180 CAPSULE | Refills: 5 | Status: SHIPPED | OUTPATIENT
Start: 2019-02-21 | End: 2019-03-23

## 2019-02-21 RX ORDER — HYOSCYAMINE SULFATE EXTENDED-RELEASE 0.38 MG/1
375 TABLET ORAL EVERY 12 HOURS PRN
Qty: 60 TABLET | Refills: 5 | Status: SHIPPED | OUTPATIENT
Start: 2019-02-21

## 2019-02-21 RX ORDER — CELECOXIB 200 MG/1
400 CAPSULE ORAL DAILY
Qty: 180 CAPSULE | Refills: 1 | Status: SHIPPED | OUTPATIENT
Start: 2019-02-21

## 2019-02-21 RX ORDER — ATORVASTATIN CALCIUM 10 MG/1
10 TABLET, FILM COATED ORAL DAILY
Qty: 30 TABLET | Refills: 5 | Status: SHIPPED | OUTPATIENT
Start: 2019-02-21

## 2019-02-21 RX ORDER — SEMAGLUTIDE 1.34 MG/ML
0.5 INJECTION, SOLUTION SUBCUTANEOUS WEEKLY
Qty: 4 PEN | Refills: 3 | Status: SHIPPED | OUTPATIENT
Start: 2019-02-21

## 2019-02-21 RX ORDER — LISINOPRIL 10 MG/1
10 TABLET ORAL DAILY
Qty: 30 TABLET | Refills: 5 | Status: SHIPPED | OUTPATIENT
Start: 2019-02-21

## 2019-02-21 ASSESSMENT — ENCOUNTER SYMPTOMS
BLURRED VISION: 0
WHEEZING: 0
SHORTNESS OF BREATH: 0
COUGH: 0
VISUAL CHANGE: 0

## 2019-02-21 ASSESSMENT — PATIENT HEALTH QUESTIONNAIRE - PHQ9
2. FEELING DOWN, DEPRESSED OR HOPELESS: 0
SUM OF ALL RESPONSES TO PHQ QUESTIONS 1-9: 0
SUM OF ALL RESPONSES TO PHQ9 QUESTIONS 1 & 2: 0
SUM OF ALL RESPONSES TO PHQ QUESTIONS 1-9: 0
1. LITTLE INTEREST OR PLEASURE IN DOING THINGS: 0

## 2019-03-01 RX ORDER — INSULIN LISPRO 100 [IU]/ML
INJECTION, SUSPENSION SUBCUTANEOUS
Qty: 15 PEN | Refills: 3 | Status: SHIPPED | OUTPATIENT
Start: 2019-03-01

## 2019-03-05 ENCOUNTER — TELEPHONE (OUTPATIENT)
Dept: PRIMARY CARE CLINIC | Age: 49
End: 2019-03-05

## 2019-03-18 ENCOUNTER — TELEPHONE (OUTPATIENT)
Dept: PRIMARY CARE CLINIC | Age: 49
End: 2019-03-18

## 2019-03-22 ENCOUNTER — TELEPHONE (OUTPATIENT)
Dept: PRIMARY CARE CLINIC | Age: 49
End: 2019-03-22

## 2019-05-14 ENCOUNTER — TELEPHONE (OUTPATIENT)
Dept: ENDOCRINOLOGY | Age: 49
End: 2019-05-14

## 2019-05-14 NOTE — TELEPHONE ENCOUNTER
Pt calling to schedule appt with Dr [facility] at [facility] but needs his phone number. Information provided.

## 2021-08-09 ENCOUNTER — HOSPITAL ENCOUNTER (OUTPATIENT)
Dept: WOMENS IMAGING | Age: 51
Discharge: HOME | End: 2021-08-11
Payer: COMMERCIAL

## 2021-08-09 VITALS — HEIGHT: 63 IN | BODY MASS INDEX: 51.37 KG/M2

## 2021-08-09 DIAGNOSIS — Z12.31 ENCOUNTER FOR SCREENING MAMMOGRAM FOR BREAST CANCER: ICD-10-CM

## 2021-08-09 PROCEDURE — 77067 SCR MAMMO BI INCL CAD: CPT

## 2023-03-21 DIAGNOSIS — G47.00 INSOMNIA, UNSPECIFIED: ICD-10-CM

## 2023-03-21 DIAGNOSIS — R51.9 HEADACHE: ICD-10-CM

## 2023-03-21 DIAGNOSIS — M25.511 PAIN IN RIGHT SHOULDER: ICD-10-CM

## 2023-03-21 DIAGNOSIS — K21.9 GASTRO-ESOPHAGEAL REFLUX DISEASE WITHOUT ESOPHAGITIS: ICD-10-CM

## 2023-03-21 DIAGNOSIS — J30.9 ALLERGIC RHINITIS, UNSPECIFIED: ICD-10-CM

## 2023-03-22 RX ORDER — TRAZODONE HYDROCHLORIDE 50 MG/1
TABLET ORAL
Qty: 30 TABLET | Refills: 0 | Status: SHIPPED | OUTPATIENT
Start: 2023-03-22 | End: 2023-04-23

## 2023-03-22 RX ORDER — DIPHENHYDRAMINE HYDROCHLORIDE 25 MG/1
CAPSULE ORAL
Qty: 180 CAPSULE | Refills: 0 | Status: SHIPPED | OUTPATIENT
Start: 2023-03-22 | End: 2023-04-23

## 2023-03-22 RX ORDER — ONDANSETRON 4 MG/1
TABLET, ORALLY DISINTEGRATING ORAL
Qty: 25 TABLET | Refills: 0 | Status: SHIPPED | OUTPATIENT
Start: 2023-03-22 | End: 2023-04-23

## 2023-03-22 RX ORDER — IBUPROFEN 600 MG/1
TABLET ORAL
Qty: 90 TABLET | Refills: 0 | Status: SHIPPED | OUTPATIENT
Start: 2023-03-22 | End: 2023-04-23

## 2023-03-24 RX ORDER — OMEPRAZOLE 20 MG/1
CAPSULE, DELAYED RELEASE ORAL
Qty: 30 CAPSULE | Refills: 1 | Status: SHIPPED | OUTPATIENT
Start: 2023-03-24 | End: 2023-09-05

## 2023-04-18 DIAGNOSIS — K21.9 GASTRO-ESOPHAGEAL REFLUX DISEASE WITHOUT ESOPHAGITIS: ICD-10-CM

## 2023-04-18 DIAGNOSIS — M25.511 PAIN IN RIGHT SHOULDER: ICD-10-CM

## 2023-04-18 DIAGNOSIS — R51.9 HEADACHE: ICD-10-CM

## 2023-04-18 DIAGNOSIS — J30.9 ALLERGIC RHINITIS, UNSPECIFIED: ICD-10-CM

## 2023-04-18 DIAGNOSIS — G47.00 INSOMNIA, UNSPECIFIED: ICD-10-CM

## 2023-04-23 RX ORDER — ONDANSETRON 4 MG/1
TABLET, ORALLY DISINTEGRATING ORAL
Qty: 25 TABLET | Refills: 1 | Status: SHIPPED | OUTPATIENT
Start: 2023-04-23 | End: 2023-06-14

## 2023-04-23 RX ORDER — METHOCARBAMOL 500 MG/1
TABLET, FILM COATED ORAL
Qty: 60 TABLET | Refills: 1 | Status: SHIPPED | OUTPATIENT
Start: 2023-04-23 | End: 2023-06-14

## 2023-04-23 RX ORDER — TRAZODONE HYDROCHLORIDE 50 MG/1
TABLET ORAL
Qty: 30 TABLET | Refills: 2 | Status: SHIPPED | OUTPATIENT
Start: 2023-04-23 | End: 2023-07-11

## 2023-04-23 RX ORDER — DICYCLOMINE HYDROCHLORIDE 10 MG/1
CAPSULE ORAL
Qty: 120 CAPSULE | Refills: 1 | Status: SHIPPED | OUTPATIENT
Start: 2023-04-23 | End: 2023-06-14

## 2023-04-23 RX ORDER — IBUPROFEN 600 MG/1
TABLET ORAL
Qty: 90 TABLET | Refills: 1 | Status: SHIPPED | OUTPATIENT
Start: 2023-04-23 | End: 2023-06-12 | Stop reason: SDUPTHER

## 2023-04-23 RX ORDER — DIPHENHYDRAMINE HYDROCHLORIDE 25 MG/1
CAPSULE ORAL
Qty: 180 CAPSULE | Refills: 1 | Status: SHIPPED | OUTPATIENT
Start: 2023-04-23 | End: 2023-06-14

## 2023-05-17 DIAGNOSIS — G60.9 IDIOPATHIC PERIPHERAL NEUROPATHY: ICD-10-CM

## 2023-05-17 DIAGNOSIS — E11.9 TYPE 2 DIABETES MELLITUS WITHOUT COMPLICATIONS (MULTI): ICD-10-CM

## 2023-05-17 DIAGNOSIS — K21.9 GASTRO-ESOPHAGEAL REFLUX DISEASE WITHOUT ESOPHAGITIS: ICD-10-CM

## 2023-05-17 DIAGNOSIS — E55.9 VITAMIN D DEFICIENCY, UNSPECIFIED: ICD-10-CM

## 2023-05-23 PROBLEM — G62.9 PERIPHERAL NEUROPATHY: Status: ACTIVE | Noted: 2023-05-23

## 2023-05-23 RX ORDER — GABAPENTIN 400 MG/1
400 CAPSULE ORAL 4 TIMES DAILY
COMMUNITY
End: 2023-06-12 | Stop reason: SDUPTHER

## 2023-05-23 NOTE — TELEPHONE ENCOUNTER
Dr. Nj pt:  Refill on Gabapentin 400 mg   Pharmacy: [facility] [address], [place] [address]

## 2023-05-24 RX ORDER — ORAL SEMAGLUTIDE 14 MG/1
TABLET ORAL
Qty: 30 TABLET | Refills: 1 | OUTPATIENT
Start: 2023-05-24

## 2023-05-24 RX ORDER — ERGOCALCIFEROL 1.25 MG/1
CAPSULE ORAL
Qty: 4 CAPSULE | Refills: 1 | OUTPATIENT
Start: 2023-05-24

## 2023-05-24 RX ORDER — GABAPENTIN 400 MG/1
400 CAPSULE ORAL 4 TIMES DAILY
Qty: 120 CAPSULE | Refills: 0 | OUTPATIENT
Start: 2023-05-24

## 2023-05-24 RX ORDER — OMEPRAZOLE 20 MG/1
CAPSULE, DELAYED RELEASE ORAL
Qty: 30 CAPSULE | Refills: 1 | OUTPATIENT
Start: 2023-05-24

## 2023-05-25 RX ORDER — ORAL SEMAGLUTIDE 14 MG/1
14 TABLET ORAL DAILY
COMMUNITY
End: 2023-06-14 | Stop reason: SDUPTHER

## 2023-05-25 RX ORDER — ASPIRIN 325 MG
TABLET, DELAYED RELEASE (ENTERIC COATED) ORAL
COMMUNITY
End: 2023-10-05 | Stop reason: SDUPTHER

## 2023-05-25 NOTE — TELEPHONE ENCOUNTER
Patient is scheduled for a 6/12. Asking if she can have a short term to hold her over until she is able to come in.    Please advise, thank you

## 2023-05-26 RX ORDER — OMEPRAZOLE 20 MG/1
20 CAPSULE, DELAYED RELEASE ORAL DAILY
Qty: 15 CAPSULE | Refills: 0 | OUTPATIENT
Start: 2023-05-26

## 2023-05-26 RX ORDER — GABAPENTIN 400 MG/1
400 CAPSULE ORAL 4 TIMES DAILY
Qty: 60 CAPSULE | Refills: 0 | OUTPATIENT
Start: 2023-05-26

## 2023-05-26 RX ORDER — ASPIRIN 325 MG
50000 TABLET, DELAYED RELEASE (ENTERIC COATED) ORAL
Qty: 2 CAPSULE | Refills: 0 | OUTPATIENT
Start: 2023-05-26

## 2023-05-30 ENCOUNTER — TELEPHONE (OUTPATIENT)
Dept: PRIMARY CARE | Facility: CLINIC | Age: 53
End: 2023-05-30

## 2023-06-01 ENCOUNTER — TELEPHONE (OUTPATIENT)
Dept: PRIMARY CARE | Facility: CLINIC | Age: 53
End: 2023-06-01

## 2023-06-01 NOTE — TELEPHONE ENCOUNTER
Dr Nj     Pt phoned office again regarding her gabapentin. Offered her a MA visit to do UDS/CSA and pt declined. She is going to conv. Care.

## 2023-06-02 ENCOUNTER — CLINICAL SUPPORT (OUTPATIENT)
Dept: PRIMARY CARE | Facility: CLINIC | Age: 53
End: 2023-06-02
Payer: COMMERCIAL

## 2023-06-02 DIAGNOSIS — Z79.899 CONTROLLED SUBSTANCE AGREEMENT SIGNED: Primary | ICD-10-CM

## 2023-06-02 DIAGNOSIS — Z79.899 MEDICATION MANAGEMENT: ICD-10-CM

## 2023-06-02 LAB
AMPHETAMINE (PRESENCE) IN URINE BY SCREEN METHOD: NORMAL
BARBITURATES PRESENCE IN URINE BY SCREEN METHOD: NORMAL
BENZODIAZEPINE (PRESENCE) IN URINE BY SCREEN METHOD: NORMAL
CANNABINOIDS IN URINE BY SCREEN METHOD: NORMAL
COCAINE (PRESENCE) IN URINE BY SCREEN METHOD: NORMAL
DRUG SCREEN COMMENT URINE: NORMAL
FENTANYL URINE: NORMAL
METHADONE (PRESENCE) IN URINE BY SCREEN METHOD: NORMAL
OPIATES (PRESENCE) IN URINE BY SCREEN METHOD: NORMAL
OXYCODONE (PRESENCE) IN URINE BY SCREEN METHOD: NORMAL
PHENCYCLIDINE (PRESENCE) IN URINE BY SCREEN METHOD: NORMAL

## 2023-06-02 PROCEDURE — 80355 GABAPENTIN NON-BLOOD: CPT

## 2023-06-02 PROCEDURE — 80307 DRUG TEST PRSMV CHEM ANLYZR: CPT

## 2023-06-05 ENCOUNTER — TELEPHONE (OUTPATIENT)
Dept: PRIMARY CARE | Facility: CLINIC | Age: 53
End: 2023-06-05

## 2023-06-05 NOTE — TELEPHONE ENCOUNTER
Dr. Nj patient  Calling to request refill for Gabapentin  [id number] - [place], [place] - [address]

## 2023-06-06 LAB — GABAPENTIN,URINE: <5 UG/ML

## 2023-06-07 DIAGNOSIS — G62.9 PERIPHERAL POLYNEUROPATHY: Primary | ICD-10-CM

## 2023-06-07 RX ORDER — GABAPENTIN 400 MG/1
400 CAPSULE ORAL 4 TIMES DAILY
Qty: 120 CAPSULE | Refills: 0 | Status: SHIPPED | OUTPATIENT
Start: 2023-06-07 | End: 2023-07-13 | Stop reason: SDUPTHER

## 2023-06-12 ENCOUNTER — OFFICE VISIT (OUTPATIENT)
Dept: PRIMARY CARE | Facility: CLINIC | Age: 53
End: 2023-06-12
Payer: COMMERCIAL

## 2023-06-12 ENCOUNTER — APPOINTMENT (OUTPATIENT)
Dept: PRIMARY CARE | Facility: CLINIC | Age: 53
End: 2023-06-12
Payer: COMMERCIAL

## 2023-06-12 VITALS
HEART RATE: 110 BPM | WEIGHT: 279.4 LBS | OXYGEN SATURATION: 95 % | HEIGHT: 63 IN | BODY MASS INDEX: 49.5 KG/M2 | RESPIRATION RATE: 16 BRPM | DIASTOLIC BLOOD PRESSURE: 72 MMHG | SYSTOLIC BLOOD PRESSURE: 126 MMHG

## 2023-06-12 DIAGNOSIS — M25.511 PAIN IN RIGHT SHOULDER: ICD-10-CM

## 2023-06-12 DIAGNOSIS — K21.9 GASTROESOPHAGEAL REFLUX DISEASE WITHOUT ESOPHAGITIS: Chronic | ICD-10-CM

## 2023-06-12 DIAGNOSIS — E11.9 TYPE 2 DIABETES MELLITUS WITHOUT COMPLICATION, WITHOUT LONG-TERM CURRENT USE OF INSULIN (MULTI): Primary | ICD-10-CM

## 2023-06-12 DIAGNOSIS — R51.9 HEADACHE: ICD-10-CM

## 2023-06-12 DIAGNOSIS — J30.9 ALLERGIC RHINITIS, UNSPECIFIED: ICD-10-CM

## 2023-06-12 DIAGNOSIS — E66.01 MORBID OBESITY (MULTI): ICD-10-CM

## 2023-06-12 DIAGNOSIS — I10 PRIMARY HYPERTENSION: ICD-10-CM

## 2023-06-12 DIAGNOSIS — K21.9 GASTRO-ESOPHAGEAL REFLUX DISEASE WITHOUT ESOPHAGITIS: ICD-10-CM

## 2023-06-12 DIAGNOSIS — G60.9 IDIOPATHIC PERIPHERAL NEUROPATHY: ICD-10-CM

## 2023-06-12 DIAGNOSIS — R52 PAIN: ICD-10-CM

## 2023-06-12 PROBLEM — E78.5 DYSLIPIDEMIA: Status: ACTIVE | Noted: 2023-06-12

## 2023-06-12 PROBLEM — E55.9 VITAMIN D DEFICIENCY: Status: ACTIVE | Noted: 2023-06-12

## 2023-06-12 PROBLEM — G47.00 INSOMNIA: Status: ACTIVE | Noted: 2023-06-12

## 2023-06-12 PROBLEM — G56.03 BILATERAL CARPAL TUNNEL SYNDROME: Status: ACTIVE | Noted: 2019-05-20

## 2023-06-12 PROBLEM — K56.609 SMALL BOWEL OBSTRUCTION (MULTI): Status: ACTIVE | Noted: 2022-05-05

## 2023-06-12 LAB
POC FINGERSTICK BLOOD GLUCOSE: 255 MG/DL (ref 70–100)
POC HEMOGLOBIN A1C: 11.1 % (ref 4.2–6.5)

## 2023-06-12 PROCEDURE — 99214 OFFICE O/P EST MOD 30 MIN: CPT | Performed by: FAMILY MEDICINE

## 2023-06-12 PROCEDURE — 83036 HEMOGLOBIN GLYCOSYLATED A1C: CPT | Performed by: FAMILY MEDICINE

## 2023-06-12 PROCEDURE — 82962 GLUCOSE BLOOD TEST: CPT | Performed by: FAMILY MEDICINE

## 2023-06-12 RX ORDER — FUROSEMIDE 20 MG/1
TABLET ORAL
COMMUNITY
End: 2023-10-05 | Stop reason: SDUPTHER

## 2023-06-12 RX ORDER — ATORVASTATIN CALCIUM 10 MG/1
TABLET, FILM COATED ORAL
COMMUNITY
Start: 2019-02-21 | End: 2023-07-11

## 2023-06-12 RX ORDER — PIOGLITAZONEHYDROCHLORIDE 30 MG/1
30 TABLET ORAL DAILY
Qty: 30 TABLET | Refills: 1 | Status: SHIPPED | OUTPATIENT
Start: 2023-06-12 | End: 2023-07-11

## 2023-06-12 RX ORDER — GABAPENTIN 400 MG/1
400 CAPSULE ORAL 4 TIMES DAILY
Qty: 90 CAPSULE | Refills: 3 | Status: SHIPPED | OUTPATIENT
Start: 2023-06-12 | End: 2023-10-05 | Stop reason: SDUPTHER

## 2023-06-12 RX ORDER — LISINOPRIL 10 MG/1
10 TABLET ORAL DAILY
COMMUNITY
Start: 2019-02-21 | End: 2023-07-11

## 2023-06-12 RX ORDER — DEXTROMETHORPHAN HYDROBROMIDE, GUAIFENESIN 5; 100 MG/5ML; MG/5ML
650 LIQUID ORAL EVERY 8 HOURS PRN
Qty: 60 TABLET | Refills: 0 | Status: SHIPPED | OUTPATIENT
Start: 2023-06-12 | End: 2023-07-26

## 2023-06-12 RX ORDER — IBUPROFEN 800 MG/1
800 TABLET ORAL EVERY 8 HOURS PRN
Qty: 60 TABLET | Refills: 1 | Status: SHIPPED | OUTPATIENT
Start: 2023-06-12 | End: 2023-07-13 | Stop reason: ALTCHOICE

## 2023-06-12 RX ORDER — BLOOD-GLUCOSE METER
EACH MISCELLANEOUS
COMMUNITY
Start: 2020-03-25 | End: 2023-10-05 | Stop reason: SDUPTHER

## 2023-06-12 RX ORDER — GLIMEPIRIDE 4 MG/1
TABLET ORAL 2 TIMES DAILY
COMMUNITY
Start: 2020-08-10 | End: 2023-07-11

## 2023-06-12 RX ORDER — LIDOCAINE 50 MG/G
PATCH TOPICAL
COMMUNITY
Start: 2023-06-10 | End: 2023-07-13 | Stop reason: SDUPTHER

## 2023-06-12 RX ORDER — FLASH GLUCOSE SENSOR
KIT MISCELLANEOUS
COMMUNITY
Start: 2023-06-10 | End: 2023-07-25 | Stop reason: SDUPTHER

## 2023-06-12 ASSESSMENT — ENCOUNTER SYMPTOMS
ADENOPATHY: 0
NERVOUS/ANXIOUS: 0
CONSTIPATION: 0
ARTHRALGIAS: 0
LOSS OF SENSATION IN FEET: 0
DEPRESSION: 0
HEMATURIA: 0
STRIDOR: 0
DYSURIA: 0
DECREASED CONCENTRATION: 0
SINUS PRESSURE: 0
RECTAL PAIN: 0
AGITATION: 0
SEIZURES: 0
POLYDIPSIA: 0
MYALGIAS: 0
FEVER: 0
ACTIVITY CHANGE: 0
DYSPHORIC MOOD: 0
DIARRHEA: 0
APPETITE CHANGE: 0
SHORTNESS OF BREATH: 0
RHINORRHEA: 0
CONSTITUTIONAL NEGATIVE: 1
ABDOMINAL DISTENTION: 0
TROUBLE SWALLOWING: 0
POLYPHAGIA: 0
HEADACHES: 0
SINUS PAIN: 0
SLEEP DISTURBANCE: 0
OCCASIONAL FEELINGS OF UNSTEADINESS: 0
PHOTOPHOBIA: 0
DIZZINESS: 0
BLOOD IN STOOL: 0
CHEST TIGHTNESS: 0
FLANK PAIN: 0
NECK STIFFNESS: 0
CONFUSION: 0
EYE PAIN: 0
SPEECH DIFFICULTY: 0
COLOR CHANGE: 0
FATIGUE: 0
PALPITATIONS: 0
ABDOMINAL PAIN: 0
COUGH: 0
SORE THROAT: 0

## 2023-06-12 NOTE — PROGRESS NOTES
"Subjective   Patient ID: Yane Lang is a 53 y.o. female who presents for Med Refill.    HPI patient is in office for her gabapentin medication to get refilled.    Review of Systems   Constitutional: Negative.  Negative for activity change, appetite change, fatigue and fever.   HENT:  Negative for congestion, dental problem, ear discharge, ear pain, mouth sores, rhinorrhea, sinus pressure, sinus pain, sore throat, tinnitus and trouble swallowing.    Eyes:  Negative for photophobia, pain and visual disturbance.   Respiratory:  Negative for cough, chest tightness, shortness of breath and stridor.    Cardiovascular:  Negative for chest pain and palpitations.   Gastrointestinal:  Negative for abdominal distention, abdominal pain, blood in stool, constipation, diarrhea and rectal pain.   Endocrine: Negative for cold intolerance, heat intolerance, polydipsia, polyphagia and polyuria.   Genitourinary:  Negative for dysuria, flank pain, hematuria and urgency.   Musculoskeletal:  Negative for arthralgias, gait problem, myalgias and neck stiffness.   Skin:  Negative for color change and rash.   Allergic/Immunologic: Negative for environmental allergies and food allergies.   Neurological:  Negative for dizziness, seizures, syncope, speech difficulty and headaches.   Hematological:  Negative for adenopathy.   Psychiatric/Behavioral:  Negative for agitation, confusion, decreased concentration, dysphoric mood and sleep disturbance. The patient is not nervous/anxious.        Objective   /72 (BP Location: Left arm, Patient Position: Sitting, BP Cuff Size: Adult)   Pulse 110   Resp 16   Ht 1.6 m (5' 3\")   Wt 127 kg (279 lb 6.4 oz)   SpO2 95%   BMI 49.49 kg/m²     Physical Exam  Vitals reviewed.   Constitutional:       General: She is not in acute distress.     Appearance: Normal appearance. She is obese. She is not ill-appearing or diaphoretic.   HENT:      Head: Normocephalic.      Right Ear: Tympanic membrane and " external ear normal.      Left Ear: Tympanic membrane and external ear normal.      Nose: Nose normal. No congestion.      Mouth/Throat:      Mouth: Mucous membranes are dry.      Pharynx: No posterior oropharyngeal erythema.   Eyes:      General:         Right eye: No discharge.         Left eye: No discharge.      Extraocular Movements: Extraocular movements intact.      Conjunctiva/sclera: Conjunctivae normal.      Pupils: Pupils are equal, round, and reactive to light.   Cardiovascular:      Rate and Rhythm: Normal rate and regular rhythm.      Pulses: Normal pulses.      Heart sounds: Normal heart sounds. No murmur heard.  Pulmonary:      Effort: Pulmonary effort is normal. No respiratory distress.      Breath sounds: Normal breath sounds. No wheezing or rales.   Chest:      Chest wall: No tenderness.   Abdominal:      General: Abdomen is flat. Bowel sounds are normal. There is distension.      Palpations: There is no mass.      Tenderness: There is no abdominal tenderness. There is no guarding.   Genitourinary:     Rectum: Normal.   Musculoskeletal:         General: No tenderness. Normal range of motion.      Cervical back: Normal range of motion and neck supple. No tenderness.      Right lower leg: No edema.      Left lower leg: No edema.   Skin:     General: Skin is warm and dry.      Coloration: Skin is not jaundiced.      Findings: No bruising or erythema.   Neurological:      General: No focal deficit present.      Mental Status: She is alert and oriented to person, place, and time. Mental status is at baseline.      Cranial Nerves: No cranial nerve deficit.      Sensory: No sensory deficit.      Coordination: Coordination normal.      Gait: Gait normal.   Psychiatric:         Mood and Affect: Mood normal.      Comments: Flat affect, mild depression         Assessment/Plan   Problem List Items Addressed This Visit          Nervous    Peripheral neuropathy    Relevant Medications    gabapentin (Neurontin)  400 mg capsule    acetaminophen (Tylenol 8 Hour) 650 mg ER tablet       Circulatory    HTN (hypertension)       Digestive    GERD (gastroesophageal reflux disease) (Chronic)       Endocrine/Metabolic    Morbid obesity (CMS/HCC)    Type 2 diabetes mellitus without complication, without long-term current use of insulin (CMS/[facility]) - Primary    Relevant Medications    empagliflozin (Jardiance) 10 mg    pioglitazone (Actos) 30 mg tablet    Other Relevant Orders    POCT fingerstick glucose manually resulted (Completed)    POCT glycosylated hemoglobin (Hb A1C) manually resulted (Completed)     Other Visit Diagnoses       Pain        Relevant Medications    gabapentin (Neurontin) 400 mg capsule    ibuprofen 800 mg tablet    acetaminophen (Tylenol 8 Hour) 650 mg ER tablet    Pain in right shoulder        Relevant Medications    ibuprofen 800 mg tablet    acetaminophen (Tylenol 8 Hour) 650 mg ER tablet              Scribe Attestation  By signing my name below, I, CARLOS Pickett , Concetta   attest that this documentation has been prepared under the direction and in the presence of Rj Nj DO.   Provider Attestation - Scribe documentation    All medical record entries made by the Scribe were at my direction and personally dictated by me. I have reviewed the chart and agree that the record accurately reflects my personal performance of the history, physical exam, discussion and plan.

## 2023-06-12 NOTE — PATIENT INSTRUCTIONS
Follow up in 1 MONTH     Continue current medications and therapy for chronic medical conditions.    Patient was advised importance of proper diet/nutrition in addition adequate hydration. Patient was encouraged moderate exercise program to include 30 minutes daily for 5 days of the week or 150 minutes weekly. Patient will follow-up with us as scheduled.    START JARDIANCE 10MG ONCE DAILY     START ACTOS 30MG ONCE DAILY     INCREASE IBUPROFEN TO 800MG PRN     IO A1C AND GLUCOSE TODAY    Tylenol 650 mg every 8 hours as needed    Patient declines IM injection due to needle phobia.  Patient has not been taking insulin

## 2023-06-14 DIAGNOSIS — E11.9 TYPE 2 DIABETES MELLITUS WITHOUT COMPLICATION, WITHOUT LONG-TERM CURRENT USE OF INSULIN (MULTI): ICD-10-CM

## 2023-06-14 DIAGNOSIS — E11.9 TYPE 2 DIABETES MELLITUS WITHOUT COMPLICATIONS (MULTI): ICD-10-CM

## 2023-06-14 RX ORDER — ORAL SEMAGLUTIDE 14 MG/1
TABLET ORAL
Qty: 30 TABLET | Refills: 3 | OUTPATIENT
Start: 2023-06-14

## 2023-06-14 RX ORDER — METHOCARBAMOL 500 MG/1
TABLET, FILM COATED ORAL
Qty: 60 TABLET | Refills: 1 | Status: SHIPPED | OUTPATIENT
Start: 2023-06-14 | End: 2023-08-08

## 2023-06-14 RX ORDER — DICYCLOMINE HYDROCHLORIDE 10 MG/1
CAPSULE ORAL
Qty: 120 CAPSULE | Refills: 1 | Status: SHIPPED | OUTPATIENT
Start: 2023-06-14 | End: 2023-08-08

## 2023-06-14 RX ORDER — LIDOCAINE 50 MG/G
PATCH TOPICAL
Qty: 90 PATCH | Refills: 1 | Status: SHIPPED | OUTPATIENT
Start: 2023-06-14 | End: 2023-10-05 | Stop reason: SDUPTHER

## 2023-06-14 RX ORDER — IBUPROFEN 600 MG/1
TABLET ORAL
Qty: 90 TABLET | Refills: 1 | Status: SHIPPED | OUTPATIENT
Start: 2023-06-14 | End: 2023-07-26 | Stop reason: SDUPTHER

## 2023-06-14 RX ORDER — DIPHENHYDRAMINE HYDROCHLORIDE 25 MG/1
CAPSULE ORAL
Qty: 180 CAPSULE | Refills: 1 | Status: SHIPPED | OUTPATIENT
Start: 2023-06-14 | End: 2023-08-08

## 2023-06-14 RX ORDER — ONDANSETRON 4 MG/1
TABLET, ORALLY DISINTEGRATING ORAL
Qty: 25 TABLET | Refills: 1 | Status: SHIPPED | OUTPATIENT
Start: 2023-06-14 | End: 2023-07-11

## 2023-06-14 NOTE — TELEPHONE ENCOUNTER
PT CALLED IN FOR A NEW SCRIPT SENT OVER TO PHARMACY 90 DAYS     REYBELSUS 14MG    [address] [address] [place]

## 2023-07-10 DIAGNOSIS — I10 ESSENTIAL (PRIMARY) HYPERTENSION: ICD-10-CM

## 2023-07-10 DIAGNOSIS — R51.9 HEADACHE: ICD-10-CM

## 2023-07-10 DIAGNOSIS — E11.9 TYPE 2 DIABETES MELLITUS WITHOUT COMPLICATIONS (MULTI): ICD-10-CM

## 2023-07-10 DIAGNOSIS — E11.9 TYPE 2 DIABETES MELLITUS WITHOUT COMPLICATION, WITHOUT LONG-TERM CURRENT USE OF INSULIN (MULTI): ICD-10-CM

## 2023-07-10 DIAGNOSIS — G47.00 INSOMNIA, UNSPECIFIED: ICD-10-CM

## 2023-07-10 DIAGNOSIS — E78.5 HYPERLIPIDEMIA, UNSPECIFIED: ICD-10-CM

## 2023-07-11 RX ORDER — ONDANSETRON 4 MG/1
TABLET, ORALLY DISINTEGRATING ORAL
Qty: 25 TABLET | Refills: 5 | Status: SHIPPED | OUTPATIENT
Start: 2023-07-11 | End: 2023-10-05 | Stop reason: SDUPTHER

## 2023-07-11 RX ORDER — GLIMEPIRIDE 4 MG/1
TABLET ORAL
Qty: 60 TABLET | Refills: 5 | Status: SHIPPED | OUTPATIENT
Start: 2023-07-11 | End: 2023-10-05 | Stop reason: SDUPTHER

## 2023-07-11 RX ORDER — EMPAGLIFLOZIN 10 MG/1
TABLET, FILM COATED ORAL
Qty: 30 TABLET | Refills: 5 | Status: SHIPPED | OUTPATIENT
Start: 2023-07-11 | End: 2023-10-05 | Stop reason: SDUPTHER

## 2023-07-11 RX ORDER — TRAZODONE HYDROCHLORIDE 50 MG/1
TABLET ORAL
Qty: 30 TABLET | Refills: 5 | Status: SHIPPED | OUTPATIENT
Start: 2023-07-11 | End: 2023-10-05 | Stop reason: SDUPTHER

## 2023-07-11 RX ORDER — PIOGLITAZONEHYDROCHLORIDE 30 MG/1
TABLET ORAL
Qty: 30 TABLET | Refills: 5 | Status: SHIPPED | OUTPATIENT
Start: 2023-07-11 | End: 2023-10-05 | Stop reason: SDUPTHER

## 2023-07-11 RX ORDER — ATORVASTATIN CALCIUM 10 MG/1
TABLET, FILM COATED ORAL
Qty: 30 TABLET | Refills: 5 | Status: SHIPPED | OUTPATIENT
Start: 2023-07-11 | End: 2023-10-05 | Stop reason: SDUPTHER

## 2023-07-11 RX ORDER — LISINOPRIL 10 MG/1
TABLET ORAL
Qty: 30 TABLET | Refills: 5 | Status: SHIPPED | OUTPATIENT
Start: 2023-07-11 | End: 2023-10-05 | Stop reason: SDUPTHER

## 2023-07-13 ENCOUNTER — OFFICE VISIT (OUTPATIENT)
Dept: PRIMARY CARE | Facility: CLINIC | Age: 53
End: 2023-07-13
Payer: COMMERCIAL

## 2023-07-13 VITALS
HEART RATE: 68 BPM | HEIGHT: 63 IN | SYSTOLIC BLOOD PRESSURE: 118 MMHG | DIASTOLIC BLOOD PRESSURE: 62 MMHG | OXYGEN SATURATION: 97 % | BODY MASS INDEX: 49.96 KG/M2 | WEIGHT: 282 LBS

## 2023-07-13 DIAGNOSIS — E78.5 DYSLIPIDEMIA: ICD-10-CM

## 2023-07-13 DIAGNOSIS — E11.9 TYPE 2 DIABETES MELLITUS WITHOUT COMPLICATION, WITHOUT LONG-TERM CURRENT USE OF INSULIN (MULTI): Primary | ICD-10-CM

## 2023-07-13 DIAGNOSIS — K21.9 GASTROESOPHAGEAL REFLUX DISEASE WITHOUT ESOPHAGITIS: Chronic | ICD-10-CM

## 2023-07-13 DIAGNOSIS — E66.01 MORBID OBESITY (MULTI): ICD-10-CM

## 2023-07-13 DIAGNOSIS — I10 PRIMARY HYPERTENSION: ICD-10-CM

## 2023-07-13 LAB — POC HEMOGLOBIN A1C: 9.6 % (ref 4.2–6.5)

## 2023-07-13 PROCEDURE — 83036 HEMOGLOBIN GLYCOSYLATED A1C: CPT | Performed by: FAMILY MEDICINE

## 2023-07-13 PROCEDURE — 99214 OFFICE O/P EST MOD 30 MIN: CPT | Performed by: FAMILY MEDICINE

## 2023-07-13 RX ORDER — INSULIN LISPRO 100 [IU]/ML
INJECTION, SUSPENSION SUBCUTANEOUS
COMMUNITY
Start: 2019-03-01 | End: 2023-07-13 | Stop reason: ALTCHOICE

## 2023-07-13 RX ORDER — HYOSCYAMINE SULFATE 0.38 MG/1
TABLET, EXTENDED RELEASE ORAL
COMMUNITY
Start: 2019-02-21 | End: 2023-10-16 | Stop reason: ALTCHOICE

## 2023-07-13 RX ORDER — ERGOCALCIFEROL 1.25 MG/1
1 CAPSULE ORAL
COMMUNITY
Start: 2023-03-19 | End: 2023-10-16 | Stop reason: ALTCHOICE

## 2023-07-13 RX ORDER — FLUTICASONE PROPIONATE 50 MCG
SPRAY, SUSPENSION (ML) NASAL
COMMUNITY
Start: 2018-09-28 | End: 2023-10-05 | Stop reason: SDUPTHER

## 2023-07-13 ASSESSMENT — ENCOUNTER SYMPTOMS
ARTHRALGIAS: 0
ADENOPATHY: 0
FEVER: 0
AGITATION: 0
NERVOUS/ANXIOUS: 0
BLOOD IN STOOL: 0
CONFUSION: 0
EYE PAIN: 0
FLANK PAIN: 0
COLOR CHANGE: 0
CHEST TIGHTNESS: 0
FATIGUE: 0
ACTIVITY CHANGE: 0
SHORTNESS OF BREATH: 0
ABDOMINAL PAIN: 0
SLEEP DISTURBANCE: 0
DECREASED CONCENTRATION: 0
NECK STIFFNESS: 0
DYSURIA: 0
POLYPHAGIA: 0
CONSTITUTIONAL NEGATIVE: 1
SPEECH DIFFICULTY: 0
SINUS PAIN: 0
POLYDIPSIA: 0
PHOTOPHOBIA: 0
PALPITATIONS: 0
DIZZINESS: 0
SINUS PRESSURE: 0
DYSPHORIC MOOD: 0
ABDOMINAL DISTENTION: 0
TROUBLE SWALLOWING: 0
APPETITE CHANGE: 0
SEIZURES: 0
DIARRHEA: 0
RECTAL PAIN: 0
CONSTIPATION: 0
MYALGIAS: 0
COUGH: 0
HEADACHES: 0
RHINORRHEA: 0
HEMATURIA: 0
STRIDOR: 0
SORE THROAT: 0

## 2023-07-13 ASSESSMENT — PATIENT HEALTH QUESTIONNAIRE - PHQ9
2. FEELING DOWN, DEPRESSED OR HOPELESS: NOT AT ALL
2. FEELING DOWN, DEPRESSED OR HOPELESS: NOT AT ALL
SUM OF ALL RESPONSES TO PHQ9 QUESTIONS 1 AND 2: 0
1. LITTLE INTEREST OR PLEASURE IN DOING THINGS: NOT AT ALL
SUM OF ALL RESPONSES TO PHQ9 QUESTIONS 1 AND 2: 0
1. LITTLE INTEREST OR PLEASURE IN DOING THINGS: NOT AT ALL

## 2023-07-13 NOTE — PATIENT INSTRUCTIONS
Follow up in 10 weeks    Continue current medications and therapy for chronic medical conditions.    Patient was advised importance of proper diet/nutrition in addition adequate hydration. Patient was encouraged moderate exercise program to include 30 minutes daily for 5 days of the week or 150 minutes weekly. Patient will follow-up with us as scheduled.    I personally reviewed the OARRS report for this patient. I have considered the risks of abuse, dependence, addiction, and diversion.    UDS/CSA: 06/02/2023    In office davian and shamir

## 2023-07-13 NOTE — PROGRESS NOTES
"Subjective   Patient ID: Yane Lang is a 53 y.o. female who presents for Follow-up (diabetes).    Patient is here for a follow up after starting jardiance and actos, states every time she eats her sugars tend to go up to around 219. States she had one day it was 359 and other times she will get 119. States she feels there is a little difference with starting these medications but her sugars are still above 200 normally.   Denies any low sugars she is concerned about.     Patient denies any other concerns at this time.          Review of Systems   Constitutional: Negative.  Negative for activity change, appetite change, fatigue and fever.   HENT:  Negative for congestion, dental problem, ear discharge, ear pain, mouth sores, rhinorrhea, sinus pressure, sinus pain, sore throat, tinnitus and trouble swallowing.    Eyes:  Negative for photophobia, pain and visual disturbance.   Respiratory:  Negative for cough, chest tightness, shortness of breath and stridor.    Cardiovascular:  Negative for chest pain and palpitations.   Gastrointestinal:  Negative for abdominal distention, abdominal pain, blood in stool, constipation, diarrhea and rectal pain.   Endocrine: Negative for cold intolerance, heat intolerance, polydipsia, polyphagia and polyuria.   Genitourinary:  Negative for dysuria, flank pain, hematuria and urgency.   Musculoskeletal:  Negative for arthralgias, gait problem, myalgias and neck stiffness.   Skin:  Negative for color change and rash.   Allergic/Immunologic: Negative for environmental allergies and food allergies.   Neurological:  Negative for dizziness, seizures, syncope, speech difficulty and headaches.   Hematological:  Negative for adenopathy.   Psychiatric/Behavioral:  Negative for agitation, confusion, decreased concentration, dysphoric mood and sleep disturbance. The patient is not nervous/anxious.        Objective   /62   Pulse 68   Ht 1.6 m (5' 3\")   Wt 128 kg (282 lb)   SpO2 97%   " BMI 49.95 kg/m²     Physical Exam  Vitals reviewed.   Constitutional:       General: She is not in acute distress.     Appearance: Normal appearance. She is obese. She is not ill-appearing or diaphoretic.   HENT:      Head: Normocephalic.      Right Ear: Tympanic membrane and external ear normal.      Left Ear: Tympanic membrane and external ear normal.      Nose: Nose normal. No congestion.      Mouth/Throat:      Mouth: Mucous membranes are dry.      Pharynx: No posterior oropharyngeal erythema.   Eyes:      General:         Right eye: No discharge.         Left eye: No discharge.      Extraocular Movements: Extraocular movements intact.      Conjunctiva/sclera: Conjunctivae normal.      Pupils: Pupils are equal, round, and reactive to light.   Cardiovascular:      Rate and Rhythm: Normal rate and regular rhythm.      Pulses: Normal pulses.      Heart sounds: Normal heart sounds. No murmur heard.  Pulmonary:      Effort: Pulmonary effort is normal. No respiratory distress.      Breath sounds: Normal breath sounds. No wheezing or rales.   Chest:      Chest wall: No tenderness.   Abdominal:      General: Abdomen is flat. Bowel sounds are normal. There is distension.      Palpations: There is no mass.      Tenderness: There is no abdominal tenderness. There is no guarding.   Musculoskeletal:         General: No tenderness. Normal range of motion.      Cervical back: Normal range of motion and neck supple. No tenderness.      Right lower leg: No edema.      Left lower leg: No edema.   Skin:     General: Skin is warm and dry.      Coloration: Skin is not jaundiced.      Findings: No bruising or erythema.   Neurological:      General: No focal deficit present.      Mental Status: She is alert and oriented to person, place, and time. Mental status is at baseline.      Cranial Nerves: No cranial nerve deficit.      Sensory: No sensory deficit.      Coordination: Coordination normal.      Gait: Gait normal.   Psychiatric:          Mood and Affect: Mood normal.         Thought Content: Thought content normal.         Judgment: Judgment normal.         Assessment/Plan   Problem List Items Addressed This Visit       Dyslipidemia    GERD (gastroesophageal reflux disease) (Chronic)    HTN (hypertension)    Morbid obesity (CMS/[facility])    Type 2 diabetes mellitus without complication, without long-term current use of insulin (CMS/[facility]) - Primary    Relevant Orders    POCT glycosylated hemoglobin (Hb A1C) manually resulted (Completed)         Scribe Attestation  By signing my name below, I, CARLOS Pickett , Scribe   attest that this documentation has been prepared under the direction and in the presence of Rj Nj DO.   Provider Attestation - Scribe documentation    All medical record entries made by the Scribe were at my direction and personally dictated by me. I have reviewed the chart and agree that the record accurately reflects my personal performance of the history, physical exam, discussion and plan.

## 2023-07-25 DIAGNOSIS — Z13.5 DIABETIC RETINOPATHY SCREENING: Primary | ICD-10-CM

## 2023-07-25 DIAGNOSIS — M25.511 PAIN IN RIGHT SHOULDER: ICD-10-CM

## 2023-07-26 DIAGNOSIS — G60.9 IDIOPATHIC PERIPHERAL NEUROPATHY: ICD-10-CM

## 2023-07-26 DIAGNOSIS — M25.511 PAIN IN RIGHT SHOULDER: ICD-10-CM

## 2023-07-26 DIAGNOSIS — R52 PAIN: ICD-10-CM

## 2023-07-26 RX ORDER — DEXTROMETHORPHAN HYDROBROMIDE, GUAIFENESIN 5; 100 MG/5ML; MG/5ML
LIQUID ORAL
Qty: 60 TABLET | Refills: 0 | Status: SHIPPED | OUTPATIENT
Start: 2023-07-26 | End: 2024-04-22 | Stop reason: SDUPTHER

## 2023-07-26 RX ORDER — FLASH GLUCOSE SENSOR
KIT MISCELLANEOUS
Qty: 6 EACH | Refills: 4 | Status: SHIPPED | OUTPATIENT
Start: 2023-07-26

## 2023-07-26 RX ORDER — IBUPROFEN 600 MG/1
TABLET ORAL
Qty: 90 TABLET | Refills: 1 | Status: SHIPPED | OUTPATIENT
Start: 2023-07-26 | End: 2023-10-02

## 2023-07-26 NOTE — TELEPHONE ENCOUNTER
Dr. Nj pt:  Refill on: ibuprofen 800 mg tablet,acetaminophen (Tylenol 8 Hour) 650 mg   Pharmacy    [id number] - [place], [place] - [address]

## 2023-08-08 DIAGNOSIS — K21.9 GASTRO-ESOPHAGEAL REFLUX DISEASE WITHOUT ESOPHAGITIS: ICD-10-CM

## 2023-08-08 DIAGNOSIS — M25.511 PAIN IN RIGHT SHOULDER: ICD-10-CM

## 2023-08-08 DIAGNOSIS — J30.9 ALLERGIC RHINITIS, UNSPECIFIED: ICD-10-CM

## 2023-08-08 RX ORDER — DIPHENHYDRAMINE HYDROCHLORIDE 25 MG/1
CAPSULE ORAL
Qty: 180 CAPSULE | Refills: 1 | Status: SHIPPED | OUTPATIENT
Start: 2023-08-08 | End: 2023-10-07

## 2023-08-08 RX ORDER — DICYCLOMINE HYDROCHLORIDE 10 MG/1
CAPSULE ORAL
Qty: 120 CAPSULE | Refills: 1 | Status: SHIPPED | OUTPATIENT
Start: 2023-08-08 | End: 2023-10-07

## 2023-08-08 RX ORDER — METHOCARBAMOL 500 MG/1
TABLET, FILM COATED ORAL
Qty: 60 TABLET | Refills: 1 | Status: SHIPPED | OUTPATIENT
Start: 2023-08-08 | End: 2023-10-05 | Stop reason: ALTCHOICE

## 2023-08-24 DIAGNOSIS — E11.9 TYPE 2 DIABETES MELLITUS WITHOUT COMPLICATION, WITHOUT LONG-TERM CURRENT USE OF INSULIN (MULTI): Primary | ICD-10-CM

## 2023-09-05 ENCOUNTER — APPOINTMENT (OUTPATIENT)
Dept: PRIMARY CARE | Facility: CLINIC | Age: 53
End: 2023-09-05

## 2023-09-05 DIAGNOSIS — K21.9 GASTRO-ESOPHAGEAL REFLUX DISEASE WITHOUT ESOPHAGITIS: ICD-10-CM

## 2023-09-05 RX ORDER — OMEPRAZOLE 20 MG/1
CAPSULE, DELAYED RELEASE ORAL
Qty: 30 CAPSULE | Refills: 2 | Status: SHIPPED | OUTPATIENT
Start: 2023-09-05 | End: 2023-10-05 | Stop reason: SDUPTHER

## 2023-09-05 NOTE — TELEPHONE ENCOUNTER
Dr. Nj pt  Pt pharmacy called in a med refill request for zofran however Ondina Giron denied it. Pt was last seen in July.   Please send a refill to   [id number] - [place], [place] - [address]

## 2023-09-21 ENCOUNTER — OFFICE VISIT (OUTPATIENT)
Dept: PRIMARY CARE | Facility: CLINIC | Age: 53
End: 2023-09-21
Payer: COMMERCIAL

## 2023-09-21 DIAGNOSIS — I10 PRIMARY HYPERTENSION: ICD-10-CM

## 2023-09-21 DIAGNOSIS — E11.9 TYPE 2 DIABETES MELLITUS WITHOUT COMPLICATION, WITHOUT LONG-TERM CURRENT USE OF INSULIN (MULTI): ICD-10-CM

## 2023-09-21 DIAGNOSIS — G60.9 IDIOPATHIC PERIPHERAL NEUROPATHY: ICD-10-CM

## 2023-09-21 PROCEDURE — 99442 PR PHYS/QHP TELEPHONE EVALUATION 11-20 MIN: CPT | Performed by: FAMILY MEDICINE

## 2023-09-21 RX ORDER — DEXTROMETHORPHAN HYDROBROMIDE, GUAIFENESIN 5; 100 MG/5ML; MG/5ML
650 LIQUID ORAL EVERY 8 HOURS PRN
Qty: 30 TABLET | Refills: 0 | Status: SHIPPED | OUTPATIENT
Start: 2023-09-21 | End: 2023-10-01

## 2023-09-21 ASSESSMENT — ENCOUNTER SYMPTOMS
AGITATION: 0
FEVER: 0
CHEST TIGHTNESS: 0
HEADACHES: 0
EYE PAIN: 0
HYPERTENSION: 1
APPETITE CHANGE: 0
ACTIVITY CHANGE: 0
ABDOMINAL PAIN: 0
FLANK PAIN: 0
SINUS PAIN: 0
PALPITATIONS: 0
RECTAL PAIN: 0
SINUS PRESSURE: 0
POLYDIPSIA: 0
MYALGIAS: 0
BLOOD IN STOOL: 0
ADENOPATHY: 0
COLOR CHANGE: 0
DYSPHORIC MOOD: 0
DIZZINESS: 0
ARTHRALGIAS: 0
DECREASED CONCENTRATION: 0
NECK STIFFNESS: 0
SORE THROAT: 0
POLYPHAGIA: 0
PHOTOPHOBIA: 0
HEMATURIA: 0
CONSTITUTIONAL NEGATIVE: 1
NERVOUS/ANXIOUS: 0
SLEEP DISTURBANCE: 0
RHINORRHEA: 0
STRIDOR: 0
SEIZURES: 0
FATIGUE: 0
DYSURIA: 0
CONSTIPATION: 0
SHORTNESS OF BREATH: 0
COUGH: 0
DIARRHEA: 0
TROUBLE SWALLOWING: 0
SPEECH DIFFICULTY: 0
ABDOMINAL DISTENTION: 0
CONFUSION: 0

## 2023-09-21 NOTE — PATIENT INSTRUCTIONS
Patient was advised importance of proper diet/nutrition in addition adequate hydration. Patient was encouraged moderate exercise program to include 30 minutes daily for 5 days of the week or 150 minutes weekly. Patient will follow-up with us as scheduled.     continue all current medications and therapy for chronic medical conditions     Fasting BW ordered.     F/U in 4 weeks.

## 2023-09-21 NOTE — PROGRESS NOTES
Subjective   Patient ID: Yane Lang is a 53 y.o. female who presents for Hypertension and Diabetes.    Hypertension  This is a recurrent problem. The current episode started more than 1 month ago. The problem is unchanged. The problem is controlled. Pertinent negatives include no chest pain, headaches, palpitations or shortness of breath. Risk factors for coronary artery disease include diabetes mellitus and obesity.        Review of Systems   Constitutional: Negative.  Negative for activity change, appetite change, fatigue and fever.   HENT:  Negative for congestion, dental problem, ear discharge, ear pain, mouth sores, rhinorrhea, sinus pressure, sinus pain, sore throat, tinnitus and trouble swallowing.    Eyes:  Negative for photophobia, pain and visual disturbance.   Respiratory:  Negative for cough, chest tightness, shortness of breath and stridor.    Cardiovascular:  Negative for chest pain and palpitations.   Gastrointestinal:  Negative for abdominal distention, abdominal pain, blood in stool, constipation, diarrhea and rectal pain.   Endocrine: Negative for cold intolerance, heat intolerance, polydipsia, polyphagia and polyuria.   Genitourinary:  Negative for dysuria, flank pain, hematuria and urgency.   Musculoskeletal:  Negative for arthralgias, gait problem, myalgias and neck stiffness.   Skin:  Negative for color change and rash.   Allergic/Immunologic: Negative for environmental allergies and food allergies.   Neurological:  Negative for dizziness, seizures, syncope, speech difficulty and headaches.   Hematological:  Negative for adenopathy.   Psychiatric/Behavioral:  Negative for agitation, confusion, decreased concentration, dysphoric mood and sleep disturbance. The patient is not nervous/anxious.      Objective   There were no vitals taken for this visit.    Physical Exam  Constitutional: Well developed, well nourished, alert and in no acute distress   Psychiatric: Mood calm and affect  normal    Telephone Visit - Audio Communication Only     Assessment/Plan   Problem List Items Addressed This Visit       Peripheral neuropathy    Relevant Medications    acetaminophen (Tylenol 8 HOUR) 650 mg ER tablet    HTN (hypertension)    Relevant Orders    Comprehensive Metabolic Panel    Lipid Panel    Magnesium    Type 2 diabetes mellitus without complication, without long-term current use of insulin (CMS/[facility])    Relevant Orders    Follow Up In [facility] - PCP - Established       Scribe Attestation  By signing my name below, IBonnie RMA   , Ciarraibe   attest that this documentation has been prepared under the direction and in the presence of Rj Nj DO.     Provider Attestation - Scribe documentation    All medical record entries made by the Scribe were at my direction and personally dictated by me. I have reviewed the chart and agree that the record accurately reflects my personal performance of the history, physical exam, discussion and plan.       O-T Advancement Flap Text: The defect edges were debeveled with a #15 scalpel blade.  Given the location of the defect, shape of the defect and the proximity to free margins an O-T advancement flap was deemed most appropriate.  Using a sterile surgical marker, an appropriate advancement flap was drawn incorporating the defect and placing the expected incisions within the relaxed skin tension lines where possible.    The area thus outlined was incised deep to adipose tissue with a #15 scalpel blade.  The skin margins were undermined to an appropriate distance in all directions utilizing iris scissors.

## 2023-10-02 DIAGNOSIS — M25.511 PAIN IN RIGHT SHOULDER: ICD-10-CM

## 2023-10-02 RX ORDER — IBUPROFEN 600 MG/1
TABLET ORAL
Qty: 90 TABLET | Refills: 0 | Status: SHIPPED | OUTPATIENT
Start: 2023-10-02 | End: 2023-11-01

## 2023-10-04 ENCOUNTER — LAB (OUTPATIENT)
Dept: LAB | Facility: LAB | Age: 53
End: 2023-10-04
Payer: COMMERCIAL

## 2023-10-04 DIAGNOSIS — I10 PRIMARY HYPERTENSION: ICD-10-CM

## 2023-10-04 LAB
ALBUMIN SERPL BCP-MCNC: 4.2 G/DL (ref 3.4–5)
ALP SERPL-CCNC: 88 U/L (ref 33–110)
ALT SERPL W P-5'-P-CCNC: 27 U/L (ref 7–45)
ANION GAP SERPL CALC-SCNC: 16 MMOL/L (ref 10–20)
AST SERPL W P-5'-P-CCNC: 24 U/L (ref 9–39)
BILIRUB SERPL-MCNC: 0.6 MG/DL (ref 0–1.2)
BUN SERPL-MCNC: 17 MG/DL (ref 6–23)
CALCIUM SERPL-MCNC: 9.5 MG/DL (ref 8.6–10.3)
CHLORIDE SERPL-SCNC: 100 MMOL/L (ref 98–107)
CHOLEST SERPL-MCNC: 161 MG/DL (ref 0–199)
CHOLESTEROL/HDL RATIO: 3.7
CO2 SERPL-SCNC: 24 MMOL/L (ref 21–32)
CREAT SERPL-MCNC: 0.77 MG/DL (ref 0.5–1.05)
GFR SERPL CREATININE-BSD FRML MDRD: >90 ML/MIN/1.73M*2
GLUCOSE SERPL-MCNC: 266 MG/DL (ref 74–99)
HDLC SERPL-MCNC: 43.2 MG/DL
LDLC SERPL CALC-MCNC: 85 MG/DL (ref 140–190)
MAGNESIUM SERPL-MCNC: 1.82 MG/DL (ref 1.6–2.4)
NON HDL CHOLESTEROL: 118 MG/DL (ref 0–149)
POTASSIUM SERPL-SCNC: 4.6 MMOL/L (ref 3.5–5.3)
PROT SERPL-MCNC: 7.4 G/DL (ref 6.4–8.2)
SODIUM SERPL-SCNC: 135 MMOL/L (ref 136–145)
TRIGL SERPL-MCNC: 166 MG/DL (ref 0–149)
VLDL: 33 MG/DL (ref 0–40)

## 2023-10-04 PROCEDURE — 36415 COLL VENOUS BLD VENIPUNCTURE: CPT

## 2023-10-05 ENCOUNTER — TELEMEDICINE (OUTPATIENT)
Dept: PRIMARY CARE | Facility: CLINIC | Age: 53
End: 2023-10-05
Payer: COMMERCIAL

## 2023-10-05 VITALS — HEIGHT: 63 IN | WEIGHT: 282 LBS | BODY MASS INDEX: 49.96 KG/M2

## 2023-10-05 DIAGNOSIS — E66.01 MORBID OBESITY (MULTI): ICD-10-CM

## 2023-10-05 DIAGNOSIS — R52 PAIN: ICD-10-CM

## 2023-10-05 DIAGNOSIS — E78.5 HYPERLIPIDEMIA, UNSPECIFIED: ICD-10-CM

## 2023-10-05 DIAGNOSIS — E11.9 TYPE 2 DIABETES MELLITUS WITHOUT COMPLICATIONS (MULTI): ICD-10-CM

## 2023-10-05 DIAGNOSIS — G60.9 IDIOPATHIC PERIPHERAL NEUROPATHY: ICD-10-CM

## 2023-10-05 DIAGNOSIS — I10 ESSENTIAL (PRIMARY) HYPERTENSION: Primary | ICD-10-CM

## 2023-10-05 DIAGNOSIS — J30.9 ALLERGIC RHINITIS, UNSPECIFIED SEASONALITY, UNSPECIFIED TRIGGER: ICD-10-CM

## 2023-10-05 DIAGNOSIS — M25.511 PAIN IN RIGHT SHOULDER: ICD-10-CM

## 2023-10-05 DIAGNOSIS — E78.5 DYSLIPIDEMIA: ICD-10-CM

## 2023-10-05 DIAGNOSIS — R51.9 HEADACHE: ICD-10-CM

## 2023-10-05 DIAGNOSIS — I10 PRIMARY HYPERTENSION: ICD-10-CM

## 2023-10-05 DIAGNOSIS — K21.9 GASTRO-ESOPHAGEAL REFLUX DISEASE WITHOUT ESOPHAGITIS: ICD-10-CM

## 2023-10-05 DIAGNOSIS — G47.00 INSOMNIA, UNSPECIFIED: ICD-10-CM

## 2023-10-05 DIAGNOSIS — E11.9 TYPE 2 DIABETES MELLITUS WITHOUT COMPLICATION, WITHOUT LONG-TERM CURRENT USE OF INSULIN (MULTI): ICD-10-CM

## 2023-10-05 DIAGNOSIS — E55.9 VITAMIN D DEFICIENCY: ICD-10-CM

## 2023-10-05 PROCEDURE — 99214 OFFICE O/P EST MOD 30 MIN: CPT | Performed by: FAMILY MEDICINE

## 2023-10-05 RX ORDER — GABAPENTIN 400 MG/1
400 CAPSULE ORAL 4 TIMES DAILY
Qty: 360 CAPSULE | Refills: 1 | Status: SHIPPED | OUTPATIENT
Start: 2023-10-05 | End: 2024-01-27

## 2023-10-05 RX ORDER — OMEPRAZOLE 20 MG/1
20 CAPSULE, DELAYED RELEASE ORAL DAILY
Qty: 90 CAPSULE | Refills: 1 | Status: SHIPPED | OUTPATIENT
Start: 2023-10-05 | End: 2024-01-30

## 2023-10-05 RX ORDER — LIDOCAINE 50 MG/G
PATCH TOPICAL
Qty: 90 PATCH | Refills: 1 | Status: SHIPPED | OUTPATIENT
Start: 2023-10-05

## 2023-10-05 RX ORDER — FUROSEMIDE 20 MG/1
20 TABLET ORAL DAILY
Qty: 90 TABLET | Refills: 1 | Status: SHIPPED | OUTPATIENT
Start: 2023-10-05

## 2023-10-05 RX ORDER — METHOCARBAMOL 500 MG/1
TABLET, FILM COATED ORAL
Qty: 60 TABLET | Refills: 1 | Status: SHIPPED | OUTPATIENT
Start: 2023-10-05 | End: 2024-04-09 | Stop reason: SDUPTHER

## 2023-10-05 RX ORDER — BLOOD-GLUCOSE METER
EACH MISCELLANEOUS
Qty: 100 STRIP | Refills: 2 | Status: SHIPPED | OUTPATIENT
Start: 2023-10-05

## 2023-10-05 RX ORDER — TRAZODONE HYDROCHLORIDE 50 MG/1
50 TABLET ORAL NIGHTLY
Qty: 90 TABLET | Refills: 1 | Status: SHIPPED | OUTPATIENT
Start: 2023-10-05 | End: 2024-01-30

## 2023-10-05 RX ORDER — ATORVASTATIN CALCIUM 10 MG/1
10 TABLET, FILM COATED ORAL DAILY
Qty: 90 TABLET | Refills: 1 | Status: SHIPPED | OUTPATIENT
Start: 2023-10-05 | End: 2024-01-30

## 2023-10-05 RX ORDER — ASPIRIN 325 MG
50000 TABLET, DELAYED RELEASE (ENTERIC COATED) ORAL
Qty: 12 CAPSULE | Refills: 1 | Status: SHIPPED | OUTPATIENT
Start: 2023-10-05 | End: 2024-04-09 | Stop reason: SDUPTHER

## 2023-10-05 RX ORDER — ONDANSETRON 4 MG/1
4 TABLET, ORALLY DISINTEGRATING ORAL EVERY 6 HOURS PRN
Qty: 25 TABLET | Refills: 5 | Status: SHIPPED | OUTPATIENT
Start: 2023-10-05

## 2023-10-05 RX ORDER — FLUTICASONE PROPIONATE 50 MCG
SPRAY, SUSPENSION (ML) NASAL
Qty: 16 G | Refills: 1 | Status: SHIPPED | OUTPATIENT
Start: 2023-10-05 | End: 2023-10-16 | Stop reason: ALTCHOICE

## 2023-10-05 RX ORDER — GLIMEPIRIDE 4 MG/1
4 TABLET ORAL 2 TIMES DAILY
Qty: 180 TABLET | Refills: 1 | Status: SHIPPED | OUTPATIENT
Start: 2023-10-05 | End: 2024-03-27 | Stop reason: SDUPTHER

## 2023-10-05 RX ORDER — PIOGLITAZONEHYDROCHLORIDE 30 MG/1
30 TABLET ORAL DAILY
Qty: 90 TABLET | Refills: 1 | Status: SHIPPED | OUTPATIENT
Start: 2023-10-05 | End: 2024-01-30

## 2023-10-05 RX ORDER — LISINOPRIL 10 MG/1
10 TABLET ORAL DAILY
Qty: 90 TABLET | Refills: 1 | Status: SHIPPED | OUTPATIENT
Start: 2023-10-05 | End: 2024-01-30

## 2023-10-05 ASSESSMENT — ENCOUNTER SYMPTOMS
COUGH: 0
SINUS PAIN: 0
APPETITE CHANGE: 0
ARTHRALGIAS: 0
CHEST TIGHTNESS: 0
CONSTITUTIONAL NEGATIVE: 1
MYALGIAS: 0
POLYDIPSIA: 0
DECREASED CONCENTRATION: 0
HEADACHES: 0
ABDOMINAL PAIN: 0
COLOR CHANGE: 0
DIZZINESS: 0
AGITATION: 0
SEIZURES: 0
NERVOUS/ANXIOUS: 0
SLEEP DISTURBANCE: 0
ACTIVITY CHANGE: 0
PALPITATIONS: 0
SINUS PRESSURE: 0
PHOTOPHOBIA: 0
DYSURIA: 0
CONFUSION: 0
DIARRHEA: 0
CONSTIPATION: 0
TROUBLE SWALLOWING: 0
DYSPHORIC MOOD: 0
POLYPHAGIA: 0
NECK STIFFNESS: 0
SORE THROAT: 0
STRIDOR: 0
FATIGUE: 0
SPEECH DIFFICULTY: 0
SHORTNESS OF BREATH: 0
HEMATURIA: 0
FLANK PAIN: 0
EYE PAIN: 0
RECTAL PAIN: 0
BLOOD IN STOOL: 0
ADENOPATHY: 0
ABDOMINAL DISTENTION: 0
FEVER: 0
RHINORRHEA: 0

## 2023-10-05 NOTE — PROGRESS NOTES
"Subjective   Patient ID: Yane Lang is a 53 y.o. female who presents for Results (Lab work ).    Patient is here to go over her lab work.     Patient is losing her medicaid October 31st and will be getting insurance through her work. Patient is requesting refills of everything and would like to switch medications to generic so it is cheaper if she has to pay a co pay.     Denies nay other concerns at this time.         Review of Systems   Constitutional: Negative.  Negative for activity change, appetite change, fatigue and fever.   HENT:  Negative for congestion, dental problem, ear discharge, ear pain, mouth sores, rhinorrhea, sinus pressure, sinus pain, sore throat, tinnitus and trouble swallowing.    Eyes:  Negative for photophobia, pain and visual disturbance.   Respiratory:  Negative for cough, chest tightness, shortness of breath and stridor.    Cardiovascular:  Negative for chest pain and palpitations.   Gastrointestinal:  Negative for abdominal distention, abdominal pain, blood in stool, constipation, diarrhea and rectal pain.   Endocrine: Negative for cold intolerance, heat intolerance, polydipsia, polyphagia and polyuria.   Genitourinary:  Negative for dysuria, flank pain, hematuria and urgency.   Musculoskeletal:  Negative for arthralgias, gait problem, myalgias and neck stiffness.   Skin:  Negative for color change and rash.   Allergic/Immunologic: Negative for environmental allergies and food allergies.   Neurological:  Negative for dizziness, seizures, syncope, speech difficulty and headaches.   Hematological:  Negative for adenopathy.   Psychiatric/Behavioral:  Negative for agitation, confusion, decreased concentration, dysphoric mood and sleep disturbance. The patient is not nervous/anxious.        Objective   Ht 1.6 m (5' 3\")   Wt 128 kg (282 lb)   BMI 49.95 kg/m²     Physical Exam  Constitutional:       Appearance: She is obese.   Neurological:      Mental Status: She is alert. "   Psychiatric:         Mood and Affect: Mood normal.         Behavior: Behavior normal.         Thought Content: Thought content normal.         Judgment: Judgment normal.       Constitutional: Well developed, well nourished, alert and in no acute distress   Psychiatric: Mood calm and affect normal    Virtual Visit - Audio and Visual Communication Real Time     Assessment/Plan   Problem List Items Addressed This Visit             ICD-10-CM    Peripheral neuropathy G62.9    Relevant Medications    gabapentin (Neurontin) 400 mg capsule    Allergic rhinitis J30.9    Relevant Medications    fluticasone (Flonase) 50 mcg/actuation nasal spray    Dyslipidemia E78.5    HTN (hypertension) I10    Morbid obesity (CMS/[facility]) E66.01    Vitamin D deficiency E55.9    Relevant Medications    cholecalciferol (Vitamin D-3) 1,250 mcg (50,000 unit) capsule    Type 2 diabetes mellitus without complication, without long-term current use of insulin (CMS/[facility]) E11.9     Other Visit Diagnoses         Codes    Essential (primary) hypertension    -  Primary I10    Relevant Medications    furosemide (Lasix) 20 mg tablet    lisinopril 10 mg tablet    Other Relevant Orders    Follow Up In [facility] - Pharmacy    Hyperlipidemia, unspecified     E78.5    Relevant Medications    atorvastatin (Lipitor) 10 mg tablet    Pain     R52    Relevant Medications    gabapentin (Neurontin) 400 mg capsule    methocarbamol (Robaxin) 500 mg tablet    Type 2 diabetes mellitus without complications (CMS/[facility])     E11.9    Relevant Medications    blood sugar diagnostic (OneTouch Verio test strips) strip    glimepiride (Amaryl) 4 mg tablet    empagliflozin (Jardiance) 10 mg    pioglitazone (Actos) 30 mg tablet    semaglutide (Rybelsus) 14 mg tablet tablet    Other Relevant Orders    Hemoglobin A1C    Follow Up In [facility] - Pharmacy    Gastro-esophageal reflux disease without esophagitis     K21.9    Relevant Medications    omeprazole (PriLOSEC)  20 mg DR capsule    Other Relevant Orders    Follow Up In [facility] - Pharmacy    Headache     R51.9    Relevant Medications    ondansetron ODT (Zofran-ODT) 4 mg disintegrating tablet    Insomnia, unspecified     G47.00    Relevant Medications    traZODone (Desyrel) 50 mg tablet    Other Relevant Orders    Follow Up In [facility] - Pharmacy    Pain in right shoulder     M25.511    Relevant Medications    lidocaine (Lidoderm) 5 % patch    methocarbamol (Robaxin) 500 mg tablet              Scribe Attestation  By signing my name below, I, CARLOS Pickett , Concetta   attest that this documentation has been prepared under the direction and in the presence of Rj Nj DO.   Provider Attestation - Scribe documentation    All medical record entries made by the Scribe were at my direction and personally dictated by me. I have reviewed the chart and agree that the record accurately reflects my personal performance of the history, physical exam, discussion and plan.

## 2023-10-05 NOTE — PATIENT INSTRUCTIONS
Follow up in    Continue current medications and therapy for chronic medical conditions.    Patient was advised importance of proper diet/nutrition in addition adequate hydration. Patient was encouraged moderate exercise program to include 30 minutes daily for 5 days of the week or 150 minutes weekly. Patient will follow-up with us as scheduled.    I personally reviewed the OARRS report for this patient. I have considered the risks of abuse, dependence, addiction, and diversion.    UDS/CSA: 06/02/2023    STOP METHOCARBAMOL

## 2023-10-06 DIAGNOSIS — K21.9 GASTRO-ESOPHAGEAL REFLUX DISEASE WITHOUT ESOPHAGITIS: ICD-10-CM

## 2023-10-06 DIAGNOSIS — J30.9 ALLERGIC RHINITIS, UNSPECIFIED: ICD-10-CM

## 2023-10-07 RX ORDER — DIPHENHYDRAMINE HYDROCHLORIDE 25 MG/1
CAPSULE ORAL
Qty: 180 CAPSULE | Refills: 1 | Status: SHIPPED | OUTPATIENT
Start: 2023-10-07

## 2023-10-07 RX ORDER — DICYCLOMINE HYDROCHLORIDE 10 MG/1
CAPSULE ORAL
Qty: 120 CAPSULE | Refills: 1 | Status: SHIPPED | OUTPATIENT
Start: 2023-10-07

## 2023-10-14 PROBLEM — L30.9 ECZEMA: Status: ACTIVE | Noted: 2023-10-14

## 2023-10-14 PROBLEM — R51.9 PERSISTENT HEADACHES: Status: ACTIVE | Noted: 2023-10-14

## 2023-10-14 PROBLEM — M25.511 RIGHT SHOULDER PAIN: Status: ACTIVE | Noted: 2023-10-14

## 2023-10-14 PROBLEM — M19.90 ARTHRITIS: Status: ACTIVE | Noted: 2023-10-14

## 2023-10-14 PROBLEM — M65.331 ACQUIRED TRIGGER FINGER OF BOTH MIDDLE FINGERS: Status: ACTIVE | Noted: 2023-10-14

## 2023-10-14 PROBLEM — G58.9 COMPRESSION NEUROPATHY: Status: ACTIVE | Noted: 2023-10-14

## 2023-10-14 PROBLEM — R35.89 POLYURIA: Status: ACTIVE | Noted: 2023-10-14

## 2023-10-14 PROBLEM — M65.332 ACQUIRED TRIGGER FINGER OF BOTH MIDDLE FINGERS: Status: ACTIVE | Noted: 2023-10-14

## 2023-10-14 PROBLEM — R60.0 EDEMA OF EXTREMITIES: Status: ACTIVE | Noted: 2023-10-14

## 2023-10-14 RX ORDER — DICLOFENAC SODIUM 75 MG/1
75 TABLET, DELAYED RELEASE ORAL 2 TIMES DAILY
COMMUNITY
End: 2023-10-16 | Stop reason: ALTCHOICE

## 2023-10-14 RX ORDER — INSULIN LISPRO 100 [IU]/ML
INJECTION, SUSPENSION SUBCUTANEOUS
COMMUNITY
End: 2023-10-16 | Stop reason: ALTCHOICE

## 2023-10-14 RX ORDER — BLOOD SUGAR DIAGNOSTIC
STRIP MISCELLANEOUS
COMMUNITY
End: 2023-10-16 | Stop reason: ALTCHOICE

## 2023-10-14 RX ORDER — LANCETS
EACH MISCELLANEOUS 3 TIMES DAILY
COMMUNITY

## 2023-10-14 RX ORDER — ACETAMINOPHEN AND CODEINE PHOSPHATE 300; 30 MG/1; MG/1
1 TABLET ORAL EVERY 8 HOURS PRN
COMMUNITY
End: 2023-10-16 | Stop reason: ALTCHOICE

## 2023-10-14 RX ORDER — COLISTIN SULFATE, NEOMYCIN SULFATE, THONZONIUM BROMIDE AND HYDROCORTISONE ACETATE 3; 3.3; .5; 1 MG/ML; MG/ML; MG/ML; MG/ML
4 SUSPENSION AURICULAR (OTIC) 4 TIMES DAILY
COMMUNITY
End: 2023-10-16 | Stop reason: ALTCHOICE

## 2023-10-16 ENCOUNTER — TELEMEDICINE (OUTPATIENT)
Dept: PHARMACY | Facility: HOSPITAL | Age: 53
End: 2023-10-16

## 2023-10-16 DIAGNOSIS — I10 ESSENTIAL (PRIMARY) HYPERTENSION: ICD-10-CM

## 2023-10-16 DIAGNOSIS — E11.9 TYPE 2 DIABETES MELLITUS WITHOUT COMPLICATIONS (MULTI): ICD-10-CM

## 2023-10-16 DIAGNOSIS — K21.9 GASTRO-ESOPHAGEAL REFLUX DISEASE WITHOUT ESOPHAGITIS: ICD-10-CM

## 2023-10-16 DIAGNOSIS — E11.9 TYPE 2 DIABETES MELLITUS WITHOUT COMPLICATION, WITHOUT LONG-TERM CURRENT USE OF INSULIN (MULTI): Primary | ICD-10-CM

## 2023-10-16 DIAGNOSIS — G47.00 INSOMNIA, UNSPECIFIED: ICD-10-CM

## 2023-10-16 ASSESSMENT — ENCOUNTER SYMPTOMS: DIABETIC ASSOCIATED SYMPTOMS: 0

## 2023-10-16 NOTE — PROGRESS NOTES
"Subjective   Patient ID: Yane Lang is a 53 y.o. female who presents for Diabetes.    Referring Provider: Rj Nj,      Diabetes  She presents for her initial diabetic visit. She has type 2 diabetes mellitus. The initial diagnosis of diabetes was made 8 years ago. Her disease course has been improving. (Shaky, nausea) There are no diabetic associated symptoms. There are no hypoglycemic complications. Diabetic complications include peripheral neuropathy. Risk factors for coronary artery disease include tobacco exposure, obesity, diabetes mellitus, hypertension and dyslipidemia. Current diabetic treatments: Glimepiride 4mg 2 times a day, Jardiance 10mg daily, Actos 30mg daily, and Rybelsus 14mg daily. She is compliant with treatment all of the time (Patient is currently compliant with treatment, however is worried about paying for her medications once her Medicaid is gone.). Diabetic current diet: Typically 2 meals per day with a snack. Eats a lot of \"quick meals.\" Microwavable dinners, pizza, pizza rolls, hot dogs, chicken breast, etc. Patient states she drinks zero sugar KoolAid and Iced Team. When asked about meal planning, she reported none. Exercise: Patient states she is a  at a high school, so she gets movement in through walking up and down the halls, mopping, sweeping, vaccuming, etc. (Patient states she typically tests her blood sugar using the FreeStyle Sandra, however she has not had one on for the past week due to it falling off early. Prior to last week, patient states her morning blood sugars are typically between 95mg/dL-150mg/dL. Patient's lowest blood sugar was 69mg/dL and she states her highest was 352mg/dL.) An ACE inhibitor/angiotensin II receptor blocker is being taken. She does not see a podiatrist.Eye exam is current.      Patient Assistance Screening (VAF)  Patient verbally reports monthly or yearly income which is less than 400% federal poverty level. Patient " currently has Medicaid insurance, but she is going to be losing her Medicaid and starting her commercial insurance 11/1/2023 and states she does not think she will be able to afford her medications.  Application for program has been submitted for the following medications:   Jardiance 10mg take 1 tablet by mouth once a day  Rybelsus 14mg take 1 tablet by mouth once a day  Patient has been informed that program team will be reaching out to them to discuss necessary documentation, instructed to answer phone/return voicemail. Patient states she will bring her tax documents into the office on Friday 10/20/2023.  Patient aware this process may take up to 6 weeks.   If approved medication must be filled through [facility] pharmacy and may be picked up or mailed to patient.   Counseled patient on mechanism of action, side effects, contraindications, and what to do if the patient misses a dose. All patients questions were answered.     Objective     Labs  Lab Results   Component Value Date    BILITOT 0.6 10/04/2023    CALCIUM 9.5 10/04/2023    CO2 24 10/04/2023     10/04/2023    CREATININE 0.77 10/04/2023    GLUCOSE 266 (H) 10/04/2023    ALKPHOS 88 10/04/2023    K 4.6 10/04/2023    PROT 7.4 10/04/2023     (L) 10/04/2023    AST 24 10/04/2023    ALT 27 10/04/2023    BUN 17 10/04/2023    ANIONGAP 16 10/04/2023    MG 1.82 10/04/2023    ALBUMIN 4.2 10/04/2023    LIPASE 12 03/16/2020    GFRF >90 04/22/2022     Lab Results   Component Value Date    TRIG 166 (H) 10/04/2023    CHOL 161 10/04/2023    LDLCALC 85 (L) 10/04/2023    HDL 43.2 10/04/2023     Lab Results   Component Value Date    HGBA1C 9.6 (A) 07/13/2023     Current Outpatient Medications on File Prior to Visit   Medication Sig Dispense Refill    acetaminophen (Tylenol 8 Hour) 650 mg ER tablet Take 1 tablet by mouth every 8 hours if needed for mild pain (1 - 3). Do not crush, chew, or split. 60 tablet 0    atorvastatin (Lipitor) 10 mg tablet Take 1 tablet (10  mg) by mouth once daily. 90 tablet 1    Banophen 25 mg capsule TAKE 2 CAPSULES BY MOUTH EVERY 4 HOURS 180 capsule 1    cholecalciferol (Vitamin D-3) 1,250 mcg (50,000 unit) capsule Take 1 capsule (50,000 Units) by mouth 1 (one) time per week. 12 capsule 1    dicyclomine (Bentyl) 10 mg capsule TAKE 1 CAPSULE BY MOUTH FOUR TIMES DAILY 120 capsule 1    empagliflozin (Jardiance) 10 mg Take 1 tablet (10 mg) by mouth once daily. 90 tablet 1    furosemide (Lasix) 20 mg tablet Take 1 tablet (20 mg) by mouth once daily. (Patient taking differently: Take 1 tablet (20 mg) by mouth once daily as needed (swollen).) 90 tablet 1    gabapentin (Neurontin) 400 mg capsule Take 1 capsule (400 mg) by mouth 4 times a day. 360 capsule 1    glimepiride (Amaryl) 4 mg tablet Take 1 tablet (4 mg) by mouth 2 times a day. 180 tablet 1    ibuprofen 600 mg tablet TAKE 1 TABLET BY MOUTH THREE TIMES DAILY AS NEEDED FOR PAIN 90 tablet 0    lidocaine (Lidoderm) 5 % patch APPLY ONE PATCH TO THE AFFECTED AREA(S) DAILY. 12 HOURS on, 12 HOURS off. 90 patch 1    lisinopril 10 mg tablet Take 1 tablet (10 mg) by mouth once daily. for blood pressure 90 tablet 1    methocarbamol (Robaxin) 500 mg tablet TAKE 1 TO 2 TABLETS BY MOUTH NIGHTLY 60 tablet 1    omeprazole (PriLOSEC) 20 mg DR capsule Take 1 capsule (20 mg) by mouth once daily. Do not crush or chew. 90 capsule 1    ondansetron ODT (Zofran-ODT) 4 mg disintegrating tablet Take 1 tablet (4 mg) by mouth every 6 hours if needed for nausea. DISSOLVE  ON THE TONGUE 25 tablet 5    pioglitazone (Actos) 30 mg tablet Take 1 tablet (30 mg) by mouth once daily. 90 tablet 1    semaglutide (Rybelsus) 14 mg tablet tablet Take 1 tablet (14 mg) by mouth once daily. 90 tablet 1    traZODone (Desyrel) 50 mg tablet Take 1 tablet (50 mg) by mouth once daily at bedtime. 90 tablet 1    blood sugar diagnostic (OneTouch Verio test strips) strip USE 1 STRIP 3 TIMES DAILY. 100 strip 2    FreeStyle Sandra sensor system (FreeStyle  "Sandra 14 Day Sensor) kit As directed (Patient taking differently: every 14 (fourteen) days. As directed) 6 each 4    lancets misc 3 times a day.      [DISCONTINUED] acetaminophen-codeine (Tylenol w/ Codeine #3) 300-30 mg tablet Take 1 tablet by mouth every 8 hours if needed for severe pain (7 - 10).      [DISCONTINUED] diclofenac (Voltaren) 75 mg EC tablet Take 1 tablet (75 mg) by mouth 2 times a day. Do not crush, chew, or split.      [DISCONTINUED] ergocalciferol (Vitamin D-2) 1.25 MG (60379 UT) capsule Take 1 capsule (1,250 mcg) by mouth.      [DISCONTINUED] fluticasone (Flonase) 50 mcg/actuation nasal spray USE 1 SPRAY BY NASAL ROUTE DAILY 16 g 1    [DISCONTINUED] hyoscyamine ER (Levbid) 0.375 mg 12 hr tablet       [DISCONTINUED] insulin lispro protamin-lispro (HumaLOG Mix 75-25,U-100,Insuln) 100 unit/mL (75-25) suspension injection Inject under the skin 2 times a day with meals. INJECT 50 UNITS SUBCUTANEOUSLY as DIRECTED      [DISCONTINUED] neomycin-colist-HC-thonzonium (Cortisporin-TC) 3.3-3-10-0.5 mg/mL otic suspension Administer 4 drops into the right ear 4 times a day.      [DISCONTINUED] pen needle, diabetic (Novofine 32) 32 gauge x 1/4\" needle        No current facility-administered medications on file prior to visit.      Assessment/Plan   Diabetes Education  Rule of 15: eating ~15 g of carbs when BG less than 80 (half cup juice, 3-4 glucose tabs).  Recognize symptoms of high and low blood sugar.   Eat a realistic healthy diet consisting of fruits, vegetables, fiber, protein food choices on a regular basis and be aware of portion/serving sizes. Reduce carbohydrate consumption and always consume with protein and fat. Avoid foods high in saturated/trans fat, high salt content, and sweets and beverages with added sugars.  Limit alcohol consumption; alcohol may affect your blood sugar and cause hypoglycemia.   Stay active and incorporate ~30 mins of exercise into your daily routine to manage your weight and " increase the body's acceptance of insulin.    PATIENT GOALS   Fasting B - 130 mg/dL 2-HR Postprandial BG:   Less than 180 mg/dL A1c:   Less than 7.0 %     Problem List Items Addressed This Visit             ICD-10-CM    Type 2 diabetes mellitus without complication, without long-term current use of insulin (CMS/[facility]) - Primary E11.9     Patients diabetes is uncontrolled as evidenced by the most recent A1c of 9.6% on 2023. Patient is due for an updated A1c, and the lab order is in. Patient states it is not easy for her to get to a  Lab, and will try and call the office to see if they can do a point of care A1c in the office.   Continue all medications as prescribed. Patient is worried about being able to pay for medications once she no longer has Medicaid (2023). Will try and submit application for  Patient Assistance for medications that are on the formulary (Rybelsus and Jardiance). [facility] will send medications to [facility] 2023 for review.   Continue working towards healthy diet and lifestyle. Discussed carbohydrates increasing blood sugar and working on portion sizes.           Other Visit Diagnoses         Codes    Type 2 diabetes mellitus without complications (CMS/[facility])     E11.9    Relevant Orders    Follow Up In [facility] - Pharmacy    Essential (primary) hypertension     I10    Gastro-esophageal reflux disease without esophagitis     K21.9    Insomnia, unspecified     G47.00          SMOKING  Discussed smoking cessation with patient. Patient states she has started to cut back on how many cigarettes she smokes per day. Currently, patient states she has about 5 cigarettes per day. Patient does not smoke in the house, but will smoke on her break from work, on her way home, and if it is nice she will sit on her back patio at home and smoke. Patient states she has tried NRT in the past, but has not had success. States the patches gave her a rash and she did not like the gum or the  lozenges. Discussed the harm from smoking, and discussed that the patient is spending a lot of money on cigarettes. Patient states she will quit on her own without NRT.    Follow up with Clinical Pharmacy Team 11/3/2023 at 10:30AM to discuss patients new insurance and medication costs.    Continue all meds under the continuation of care with the referring provider and clinical pharmacy team.    Please reach out to the Clinical Pharmacy Team if there are any further questions.     Verbal consent to manage patient's drug therapy was obtained from patient. They were informed they may decline to participate or withdraw from participation in pharmacy services at any time.    Brianna Joseph, PharmD  [phone]

## 2023-10-16 NOTE — ASSESSMENT & PLAN NOTE
Patients diabetes is uncontrolled as evidenced by the most recent A1c of 9.6% on 7/13/2023. Patient is due for an updated A1c, and the lab order is in. Patient states it is not easy for her to get to a  Lab, and will try and call the office to see if they can do a point of care A1c in the office.   Continue all medications as prescribed. Patient is worried about being able to pay for medications once she no longer has Medicaid (11/1/2023). Will try and submit application for  Patient Assistance for medications that are on the formulary (Rybelsus and Jardiance). [facility] will send medications to [facility] 11/1/2023 for review.   Continue working towards healthy diet and lifestyle. Discussed carbohydrates increasing blood sugar and working on portion sizes.

## 2023-11-01 DIAGNOSIS — M25.511 PAIN IN RIGHT SHOULDER: ICD-10-CM

## 2023-11-01 RX ORDER — IBUPROFEN 600 MG/1
TABLET ORAL
Qty: 90 TABLET | Refills: 0 | Status: SHIPPED | OUTPATIENT
Start: 2023-11-01 | End: 2023-12-22

## 2023-11-03 ENCOUNTER — TELEMEDICINE (OUTPATIENT)
Dept: PHARMACY | Facility: HOSPITAL | Age: 53
End: 2023-11-03
Payer: COMMERCIAL

## 2023-11-03 ENCOUNTER — PHARMACY VISIT (OUTPATIENT)
Dept: PHARMACY | Facility: CLINIC | Age: 53
End: 2023-11-03
Payer: MEDICARE

## 2023-11-03 ENCOUNTER — SPECIALTY PHARMACY (OUTPATIENT)
Dept: PHARMACY | Facility: CLINIC | Age: 53
End: 2023-11-03

## 2023-11-03 DIAGNOSIS — E11.9 TYPE 2 DIABETES MELLITUS WITHOUT COMPLICATIONS (MULTI): ICD-10-CM

## 2023-11-03 DIAGNOSIS — E11.9 TYPE 2 DIABETES MELLITUS WITHOUT COMPLICATION, WITHOUT LONG-TERM CURRENT USE OF INSULIN (MULTI): Primary | ICD-10-CM

## 2023-11-03 PROCEDURE — RXMED WILLOW AMBULATORY MEDICATION CHARGE

## 2023-11-03 RX ORDER — TIRZEPATIDE 2.5 MG/.5ML
2.5 INJECTION, SOLUTION SUBCUTANEOUS
Qty: 2 ML | Refills: 0 | Status: SHIPPED | OUTPATIENT
Start: 2023-11-03 | End: 2024-01-24 | Stop reason: ALTCHOICE

## 2023-11-03 ASSESSMENT — ENCOUNTER SYMPTOMS: DIABETIC ASSOCIATED SYMPTOMS: 0

## 2023-11-03 NOTE — PROGRESS NOTES
"Subjective   Patient ID: Yane Lang is a 53 y.o. female who presents for Diabetes.    Referring Provider: Rj Nj,      Diabetes  She presents for her follow-up diabetic visit. She has type 2 diabetes mellitus. The initial diagnosis of diabetes was made 8 years ago. Her disease course has been improving. (Shaky, nausea) There are no diabetic associated symptoms. There are no hypoglycemic complications. Diabetic complications include peripheral neuropathy. Risk factors for coronary artery disease include tobacco exposure, obesity, diabetes mellitus, hypertension and dyslipidemia. Current diabetic treatments: Glimepiride 4mg 2 times a day, Jardiance 10mg daily, Actos 30mg daily, and Rybelsus 14mg daily. She is compliant with treatment all of the time (Patient is currently compliant with treatment, however is now worried about paying for medications now that she has commercial insurance.). Diabetic current diet: Typically 2 meals per day with a snack. Eats a lot of \"quick meals.\" Microwavable dinners, pizza, pizza rolls, hot dogs, chicken breast, etc. Patient states she drinks zero sugar KoolAid and Iced Team. When asked about meal planning, she reported none. Exercise: Patient states she is a  at a high school, so she gets movement in through walking up and down the halls, mopping, sweeping, vaccuming, etc. (Patient states she typically tests her blood sugar using the FreeStyle Sandra, however she only has a few boxes left and will not be able to afford it with her new insurance (~$75/month). Patient states since starting Jardiance and increasing Rybelsus her blood sugars have been between 160-180mg/dL.) An ACE inhibitor/angiotensin II receptor blocker is being taken. She does not see a podiatrist.Eye exam is current.      Patient Assistance Screening (VAF)  Patient verbally reports monthly or yearly income which is less than 400% federal poverty level. Patient had Medicaid, but now has " commercial insurance.  Application for program has been submitted for the following medications:   Jardiance 10mg take 1 tablet by mouth once a day  Mounjaro 2.5mg under the skin once weekly  Patient has been informed that program team will be reaching out to them to discuss necessary documentation, instructed to answer phone/return voicemail. [facility] has patients tax documents and will forward to the patient assistance team.  Patient aware this process may take up to 6 weeks.   If approved medication must be filled through [facility] pharmacy and may be picked up or mailed to patient.   Counseled patient on mechanism of action, side effects, contraindications, and what to do if the patient misses a dose. All patients questions were answered.     Objective     Labs  Lab Results   Component Value Date    BILITOT 0.6 10/04/2023    CALCIUM 9.5 10/04/2023    CO2 24 10/04/2023     10/04/2023    CREATININE 0.77 10/04/2023    GLUCOSE 266 (H) 10/04/2023    ALKPHOS 88 10/04/2023    K 4.6 10/04/2023    PROT 7.4 10/04/2023     (L) 10/04/2023    AST 24 10/04/2023    ALT 27 10/04/2023    BUN 17 10/04/2023    ANIONGAP 16 10/04/2023    MG 1.82 10/04/2023    ALBUMIN 4.2 10/04/2023    LIPASE 12 03/16/2020    GFRF >90 04/22/2022     Lab Results   Component Value Date    TRIG 166 (H) 10/04/2023    CHOL 161 10/04/2023    LDLCALC 85 (L) 10/04/2023    HDL 43.2 10/04/2023     Lab Results   Component Value Date    HGBA1C 9.6 (A) 07/13/2023     Current Outpatient Medications on File Prior to Visit   Medication Sig Dispense Refill    acetaminophen (Tylenol 8 Hour) 650 mg ER tablet Take 1 tablet by mouth every 8 hours if needed for mild pain (1 - 3). Do not crush, chew, or split. 60 tablet 0    atorvastatin (Lipitor) 10 mg tablet Take 1 tablet (10 mg) by mouth once daily. 90 tablet 1    Banophen 25 mg capsule TAKE 2 CAPSULES BY MOUTH EVERY 4 HOURS 180 capsule 1    blood sugar diagnostic (OneTouch Verio test strips) strip USE 1 STRIP 3  TIMES DAILY. 100 strip 2    cholecalciferol (Vitamin D-3) 1,250 mcg (50,000 unit) capsule Take 1 capsule (50,000 Units) by mouth 1 (one) time per week. 12 capsule 1    dicyclomine (Bentyl) 10 mg capsule TAKE 1 CAPSULE BY MOUTH FOUR TIMES DAILY 120 capsule 1    empagliflozin (Jardiance) 10 mg Take 1 tablet (10 mg) by mouth once daily. 90 tablet 1    FreeStyle Sandra sensor system (FreeStyle Sandra 14 Day Sensor) kit As directed (Patient taking differently: every 14 (fourteen) days. As directed) 6 each 4    furosemide (Lasix) 20 mg tablet Take 1 tablet (20 mg) by mouth once daily. (Patient taking differently: Take 1 tablet (20 mg) by mouth once daily as needed (swollen).) 90 tablet 1    gabapentin (Neurontin) 400 mg capsule Take 1 capsule (400 mg) by mouth 4 times a day. 360 capsule 1    glimepiride (Amaryl) 4 mg tablet Take 1 tablet (4 mg) by mouth 2 times a day. 180 tablet 1    ibuprofen 600 mg tablet TAKE 1 TABLET BY MOUTH THREE TIMES DAILY AS NEEDED FOR PAIN 90 tablet 0    lancets misc 3 times a day.      lidocaine (Lidoderm) 5 % patch APPLY ONE PATCH TO THE AFFECTED AREA(S) DAILY. 12 HOURS on, 12 HOURS off. 90 patch 1    lisinopril 10 mg tablet Take 1 tablet (10 mg) by mouth once daily. for blood pressure 90 tablet 1    methocarbamol (Robaxin) 500 mg tablet TAKE 1 TO 2 TABLETS BY MOUTH NIGHTLY 60 tablet 1    omeprazole (PriLOSEC) 20 mg DR capsule Take 1 capsule (20 mg) by mouth once daily. Do not crush or chew. 90 capsule 1    ondansetron ODT (Zofran-ODT) 4 mg disintegrating tablet Take 1 tablet (4 mg) by mouth every 6 hours if needed for nausea. DISSOLVE  ON THE TONGUE 25 tablet 5    pioglitazone (Actos) 30 mg tablet Take 1 tablet (30 mg) by mouth once daily. 90 tablet 1    semaglutide (Rybelsus) 14 mg tablet tablet Take 1 tablet (14 mg) by mouth once daily. 90 tablet 1    traZODone (Desyrel) 50 mg tablet Take 1 tablet (50 mg) by mouth once daily at bedtime. 90 tablet 1    [DISCONTINUED] ibuprofen 600 mg tablet  TAKE 1 TABLET BY MOUTH THREE TIMES DAILY AS NEEDED FOR PAIN 90 tablet 0     No current facility-administered medications on file prior to visit.      Assessment/Plan   Diabetes Education  Rule of 15: eating ~15 g of carbs when BG less than 80 (half cup juice, 3-4 glucose tabs).  Recognize symptoms of high and low blood sugar.   Eat a realistic healthy diet consisting of fruits, vegetables, fiber, protein food choices on a regular basis and be aware of portion/serving sizes. Reduce carbohydrate consumption and always consume with protein and fat. Avoid foods high in saturated/trans fat, high salt content, and sweets and beverages with added sugars.  Limit alcohol consumption; alcohol may affect your blood sugar and cause hypoglycemia.   Stay active and incorporate ~30 mins of exercise into your daily routine to manage your weight and increase the body's acceptance of insulin.    PATIENT GOALS   Fasting B - 130 mg/dL 2-HR Postprandial BG:   Less than 180 mg/dL A1c:   Less than 7.0 %     Mounjaro Education:   Counseled patient on MOA, expectations, side effects, duration of therapy, contraindications, administration, and monitoring parameters  Answered all patient questions and concerns  Counseled patient on Mounjaro MOA, expectations, side effects, duration of therapy, administration, and monitoring parameters.  Provided detailed dosing and administration counseling to ensure proper technique.   Reviewed Mounjaro titration schedule, starting with 2.5 mg once weekly to a goal of 15 mg once weekly if tolerated  Counseled patient on the benefits of GLP-1ra glycemic control and weight loss  Reviewed storage requirements of Mounjaro when not in use, and when to administer the medication if a dose is missed.  Advised patient that they may experience improved satiety after meals and portion sizes of meals may be reduced as doses of Mounjaro increase.    Problem List Items Addressed This Visit             ICD-10-CM     Type 2 diabetes mellitus without complication, without long-term current use of insulin (CMS/[facility]) - Primary E11.9     Patients diabetes is uncontrolled as evidenced by her most recent A1c of 9.6% (Goal <7%) on 7/13/2023. Patient is due for an updated A1c but is worried about having to pay for the lab now that she does not have Medicaid. Encouraged the patient to call her medical insurance to see what labs and doctor visits are covered, as an A1c is a routine lab and patient should not have to pay an out of pocket copay.  RX CHANGES  START Mounjaro 2.5mg under the skin once a week. Medication sent to [facility] for mail order and to be assessed for patient assistance. Patient is willing to switch to Mounjaro to help with better glycemic control as well as weight loss.  STOP Rybelsus 14mg 1 tablet daily. This will be stopped once approved for patient assistance and patient receives Mounjaro. Patient is aware Mounjaro will replace Rybelsus and they are not to be taken together  CHANGE how patient takes Glimepiride 4mg. Patient was previously taking 4mg 2 tablets at bed time. Patient states she has woken up a few times with blood sugars of 69mg/dL, 83mg/dL, and 92mg/dL. Discussed with the patient that a side effect of Glimepiride can be low blood sugars. Patient states it is usually low in the morning if she does not have a high carbohydrate dinner. Encouraged the patient to take Glimepiride 4mg 1 tablet 2 times a day to help minimize the chance of low blood sugars  CONTINUE Jardiance 10mg daily and Pioglitazone 30mg daily  Patient currently uses FreeStyle Sandra to test her blood sugars. She has a few boxes at home, but states after that she will not be able to pay for the CGM out of pocket. Discussed with the patient that [facility] can look into testing supplies that are covered by her insurance so that she can still test her blood sugars throughout the day.          Other Visit Diagnoses         Codes    Type 2  diabetes mellitus without complications (CMS/[facility])     E11.9          SMOKING  Discussed smoking cessation with patient. Patient states she has started to cut back on how many cigarettes she smokes per day. Currently, patient states she has about 5 cigarettes per day. Patient does not smoke in the house, but will smoke on her break from work, on her way home, and if it is nice she will sit on her back patio at home and smoke. Patient states she has tried NRT in the past, but has not had success. States the patches gave her a rash and she did not like the gum or the lozenges. Discussed the harm from smoking, and discussed that the patient is spending a lot of money on cigarettes. Patient states she will quit on her own without NRT.    MEDICATION COST  Patient lost her Medicaid on 10/31/2023 and is worried about medication cost  Patient was able to  all medications except Gabapentin prior to 11/1/2023  Discussed with the patient that she needs to go to her [facility] and bring her new insurance card to see how much her medications will be per month.  Patient would not like to pay more than $10-15/month for medications because money is very tight right now  Discussed that it is important to take all her medications, so she needs to get a list of what everything costs and can go over it together to discuss how her medications can be best managed    Follow up with Clinical Pharmacy Team once a determination has been made for patient assistance.    Continue all meds under the continuation of care with the referring provider and clinical pharmacy team.    Please reach out to the Clinical Pharmacy Team if there are any further questions.     Verbal consent to manage patient's drug therapy was obtained from patient. They were informed they may decline to participate or withdraw from participation in pharmacy services at any time.    Brianna Joseph, PharmD  [phone]

## 2023-11-03 NOTE — ASSESSMENT & PLAN NOTE
Patients diabetes is uncontrolled as evidenced by her most recent A1c of 9.6% (Goal <7%) on 7/13/2023. Patient is due for an updated A1c but is worried about having to pay for the lab now that she does not have Medicaid. Encouraged the patient to call her medical insurance to see what labs and doctor visits are covered, as an A1c is a routine lab and patient should not have to pay an out of pocket copay.  RX CHANGES  START Mounjaro 2.5mg under the skin once a week. Medication sent to [facility] for mail order and to be assessed for patient assistance. Patient is willing to switch to Mounjaro to help with better glycemic control as well as weight loss.  STOP Rybelsus 14mg 1 tablet daily. This will be stopped once approved for patient assistance and patient receives Mounjaro. Patient is aware Mounjaro will replace Rybelsus and they are not to be taken together  CHANGE how patient takes Glimepiride 4mg. Patient was previously taking 4mg 2 tablets at bed time. Patient states she has woken up a few times with blood sugars of 69mg/dL, 83mg/dL, and 92mg/dL. Discussed with the patient that a side effect of Glimepiride can be low blood sugars. Patient states it is usually low in the morning if she does not have a high carbohydrate dinner. Encouraged the patient to take Glimepiride 4mg 1 tablet 2 times a day to help minimize the chance of low blood sugars  CONTINUE Jardiance 10mg daily and Pioglitazone 30mg daily  Patient currently uses FreeStyle Sandra to test her blood sugars. She has a few boxes at home, but states after that she will not be able to pay for the CGM out of pocket. Discussed with the patient that [facility] can look into testing supplies that are covered by her insurance so that she can still test her blood sugars throughout the day.

## 2023-11-08 ENCOUNTER — TELEPHONE (OUTPATIENT)
Dept: PHARMACY | Facility: HOSPITAL | Age: 53
End: 2023-11-08
Payer: COMMERCIAL

## 2023-11-08 DIAGNOSIS — E11.9 TYPE 2 DIABETES MELLITUS WITHOUT COMPLICATION, UNSPECIFIED WHETHER LONG TERM INSULIN USE (MULTI): Primary | ICD-10-CM

## 2023-11-08 NOTE — TELEPHONE ENCOUNTER
Yane Lang is a 53 y.o. female who was referred to the Clinical Pharmacy Team by Rj Nj DO. The patient has been approved for  Patient Assistance Program to receive Mounjaro and Jardiance for a $0 copay. Patient has been contacted on the status of the approval. Provided the patient with the [facility] phone number, and made patient aware that she will be hearing from someone at [facility] to set up delivery for the prescriptions. Patient will be contacted each month to set up a delivery date prior to deliver of the medication. Discussed with the patient that if she has not heard from the pharmacy, and is in need of medication, they can call the pharmacy to set this up.    Approval Duration: 1 year  Date of expiration: 11/3/2024    Follow up with Clinical Pharmacy Team 12/20/2023 at 10:30am to discuss Mounjaro titration.    Continue all meds under the continuation of care with the referring provider and clinical pharmacy team.    Please reach out to the Clinical Pharmacy Team if there are any further questions.     Verbal consent to manage patient's drug therapy was obtained from patient. They were informed they may decline to participate or withdraw from participation in pharmacy services at any time.    Brianna Joseph, PharmD  [phone]

## 2023-12-18 DIAGNOSIS — M25.511 PAIN IN RIGHT SHOULDER: ICD-10-CM

## 2023-12-19 DIAGNOSIS — R60.0 EDEMA OF EXTREMITIES: ICD-10-CM

## 2023-12-20 ENCOUNTER — TELEMEDICINE (OUTPATIENT)
Dept: PHARMACY | Facility: HOSPITAL | Age: 53
End: 2023-12-20
Payer: COMMERCIAL

## 2023-12-20 DIAGNOSIS — E11.9 TYPE 2 DIABETES MELLITUS WITHOUT COMPLICATION, WITHOUT LONG-TERM CURRENT USE OF INSULIN (MULTI): Primary | ICD-10-CM

## 2023-12-20 DIAGNOSIS — E11.9 TYPE 2 DIABETES MELLITUS WITHOUT COMPLICATION, UNSPECIFIED WHETHER LONG TERM INSULIN USE (MULTI): ICD-10-CM

## 2023-12-20 ASSESSMENT — ENCOUNTER SYMPTOMS: DIABETIC ASSOCIATED SYMPTOMS: 0

## 2023-12-20 NOTE — PROGRESS NOTES
"Clinical Pharmacy Appointment    Subjective   Patient ID: Yane Lang is a 53 y.o. female who presents for Diabetes.    Referring Provider: Rj Nj,      Diabetes  She presents for her follow-up diabetic visit. She has type 2 diabetes mellitus. The initial diagnosis of diabetes was made 8 years ago. Her disease course has been improving. (Shaky, nausea) There are no diabetic associated symptoms. There are no hypoglycemic complications. Diabetic complications include peripheral neuropathy. Risk factors for coronary artery disease include tobacco exposure, obesity, diabetes mellitus, hypertension and dyslipidemia. Current diabetic treatments: Glimepiride 4mg 2 times a day, Jardiance 10mg daily, Actos 30mg daily, and Rybelsus 14mg daily. She is compliant with treatment all of the time (Patient is currently compliant with treatment, however is now worried about paying for medications now that she has commercial insurance.). Diabetic current diet: Typically 2 meals per day with a snack. Eats a lot of \"quick meals.\" Microwavable dinners, pizza, pizza rolls, hot dogs, chicken breast, etc. Patient states she drinks zero sugar KoolAid and Iced Team. When asked about meal planning, she reported none. Exercise: Patient states she is a  at a high school, so she gets movement in through walking up and down the halls, mopping, sweeping, vaccuming, etc. (Patient is testing her blood sugars 1-2 times daily. States in the morning typically 80-120mg/dL and during the day/in the evening around 160-180mg/dL.) An ACE inhibitor/angiotensin II receptor blocker is being taken. She does not see a podiatrist.Eye exam is current.     Objective     Labs  Lab Results   Component Value Date    BILITOT 0.6 10/04/2023    CALCIUM 9.5 10/04/2023    CO2 24 10/04/2023     10/04/2023    CREATININE 0.77 10/04/2023    GLUCOSE 266 (H) 10/04/2023    ALKPHOS 88 10/04/2023    K 4.6 10/04/2023    PROT 7.4 10/04/2023     (L) " 10/04/2023    AST 24 10/04/2023    ALT 27 10/04/2023    BUN 17 10/04/2023    ANIONGAP 16 10/04/2023    MG 1.82 10/04/2023    ALBUMIN 4.2 10/04/2023    LIPASE 12 03/16/2020    GFRF >90 04/22/2022     Lab Results   Component Value Date    TRIG 166 (H) 10/04/2023    CHOL 161 10/04/2023    LDLCALC 85 (L) 10/04/2023    HDL 43.2 10/04/2023     Lab Results   Component Value Date    HGBA1C 9.6 (A) 07/13/2023     Current Outpatient Medications on File Prior to Visit   Medication Sig Dispense Refill    acetaminophen (Tylenol 8 Hour) 650 mg ER tablet Take 1 tablet by mouth every 8 hours if needed for mild pain (1 - 3). Do not crush, chew, or split. 60 tablet 0    atorvastatin (Lipitor) 10 mg tablet Take 1 tablet (10 mg) by mouth once daily. 90 tablet 1    Banophen 25 mg capsule TAKE 2 CAPSULES BY MOUTH EVERY 4 HOURS 180 capsule 1    blood sugar diagnostic (OneTouch Verio test strips) strip USE 1 STRIP 3 TIMES DAILY. 100 strip 2    cholecalciferol (Vitamin D-3) 1,250 mcg (50,000 unit) capsule Take 1 capsule (50,000 Units) by mouth 1 (one) time per week. 12 capsule 1    dicyclomine (Bentyl) 10 mg capsule TAKE 1 CAPSULE BY MOUTH FOUR TIMES DAILY 120 capsule 1    empagliflozin (Jardiance) 10 mg Take 1 tablet (10 mg) by mouth once daily. 90 tablet 3    FreeStyle Sandra sensor system (FreeStyle Sandra 14 Day Sensor) kit As directed (Patient taking differently: every 14 (fourteen) days. As directed) 6 each 4    furosemide (Lasix) 20 mg tablet Take 1 tablet (20 mg) by mouth once daily. (Patient taking differently: Take 1 tablet (20 mg) by mouth once daily as needed (swollen).) 90 tablet 1    gabapentin (Neurontin) 400 mg capsule Take 1 capsule (400 mg) by mouth 4 times a day. 360 capsule 1    glimepiride (Amaryl) 4 mg tablet Take 1 tablet (4 mg) by mouth 2 times a day. 180 tablet 1    ibuprofen 600 mg tablet TAKE 1 TABLET BY MOUTH THREE TIMES DAILY AS NEEDED FOR PAIN 90 tablet 0    lancets misc 3 times a day.      lidocaine (Lidoderm) 5  % patch APPLY ONE PATCH TO THE AFFECTED AREA(S) DAILY. 12 HOURS on, 12 HOURS off. 90 patch 1    lisinopril 10 mg tablet Take 1 tablet (10 mg) by mouth once daily. for blood pressure 90 tablet 1    methocarbamol (Robaxin) 500 mg tablet TAKE 1 TO 2 TABLETS BY MOUTH NIGHTLY 60 tablet 1    omeprazole (PriLOSEC) 20 mg DR capsule Take 1 capsule (20 mg) by mouth once daily. Do not crush or chew. 90 capsule 1    ondansetron ODT (Zofran-ODT) 4 mg disintegrating tablet Take 1 tablet (4 mg) by mouth every 6 hours if needed for nausea. DISSOLVE  ON THE TONGUE 25 tablet 5    pioglitazone (Actos) 30 mg tablet Take 1 tablet (30 mg) by mouth once daily. 90 tablet 1    semaglutide (Rybelsus) 14 mg tablet tablet Take 1 tablet (14 mg) by mouth once daily. 90 tablet 1    tirzepatide (Mounjaro) 2.5 mg/0.5 mL pen injector Inject 2.5 mg under the skin every 7 days. 2 mL 0    traZODone (Desyrel) 50 mg tablet Take 1 tablet (50 mg) by mouth once daily at bedtime. 90 tablet 1     No current facility-administered medications on file prior to visit.      Assessment/Plan   Diabetes Education  Rule of 15: eating ~15 g of carbs when BG less than 80 (half cup juice, 3-4 glucose tabs).  Recognize symptoms of high and low blood sugar.   Eat a realistic healthy diet consisting of fruits, vegetables, fiber, protein food choices on a regular basis and be aware of portion/serving sizes. Reduce carbohydrate consumption and always consume with protein and fat. Avoid foods high in saturated/trans fat, high salt content, and sweets and beverages with added sugars.  Limit alcohol consumption; alcohol may affect your blood sugar and cause hypoglycemia.   Stay active and incorporate ~30 mins of exercise into your daily routine to manage your weight and increase the body's acceptance of insulin.    PATIENT GOALS   Fasting B - 130 mg/dL 2-HR Postprandial BG:   Less than 180 mg/dL A1c:   Less than 7.0 %     Mounjaro Education:   Counseled patient on MOA,  expectations, side effects, duration of therapy, contraindications, administration, and monitoring parameters  Answered all patient questions and concerns  Counseled patient on Mounjaro MOA, expectations, side effects, duration of therapy, administration, and monitoring parameters.  Provided detailed dosing and administration counseling to ensure proper technique.   Reviewed Mounjaro titration schedule, starting with 2.5 mg once weekly to a goal of 15 mg once weekly if tolerated  Counseled patient on the benefits of GLP-1ra glycemic control and weight loss  Reviewed storage requirements of Mounjaro when not in use, and when to administer the medication if a dose is missed.  Advised patient that they may experience improved satiety after meals and portion sizes of meals may be reduced as doses of Mounjaro increase.    Problem List Items Addressed This Visit             ICD-10-CM    Type 2 diabetes mellitus without complication, without long-term current use of insulin (CMS/[facility]) - Primary E11.9     Patients diabetes is uncontrolled as evidenced by her most recent A1c of 9.6% (Goal <7%) on 7/13/2023. Patient is due for an updated A1c but is worried about having to pay for the lab now that she does not have Medicaid. Encouraged the patient to call her medical insurance to see what labs and doctor visits are covered, as an A1c is a routine lab and patient should not have to pay an out of pocket copay.  RX CHANGES  START Mounjaro 2.5mg under the skin once a week. Patient received the medication from [facility] and will start once she is finished with Rybelsus.  STOP Rybelsus 14mg 1 tablet daily once her supply is gone. Patient states she has a little over a week left.  CONTINUE Jardiance 10mg daily, Pioglitazone 30mg daily, and Glimepiride 4mg 2 times a day.  Patient was able to get testing supplies through her insurance at her preferred pharmacy. Continue taking blood sugars 1-2 times daily.  Continue working towards  healthy diet and lifestyle.          Other Visit Diagnoses         Codes    Type 2 diabetes mellitus without complication, unspecified whether long term insulin use (CMS/[facility])     E11.9    Relevant Orders    Follow Up In [facility] - Pharmacy          SMOKING  Discussed smoking cessation with patient. Patient states she has started to cut back on how many cigarettes she smokes per day. Currently, patient states she has about 5 cigarettes per day. Patient does not smoke in the house, but will smoke on her break from work, on her way home, and if it is nice she will sit on her back patio at home and smoke. Patient states she has tried NRT in the past, but has not had success. States the patches gave her a rash and she did not like the gum or the lozenges. Discussed the harm from smoking, and discussed that the patient is spending a lot of money on cigarettes. Patient states she will quit on her own without NRT.    MEDICATION COST  Patient lost her Medicaid on 10/31/2023 and is worried about medication cost  Patient was able to  all medications except Gabapentin prior to 11/1/2023  Patient went to the pharmacy and was able to  all her medications at an affordable price.    Follow up with Clinical Pharmacy Team 1/24/2024 at 10:30AM to discuss Mounjaro start.    Continue all meds under the continuation of care with the referring provider and clinical pharmacy team.    Please reach out to the Clinical Pharmacy Team if there are any further questions.     Verbal consent to manage patient's drug therapy was obtained from patient. They were informed they may decline to participate or withdraw from participation in pharmacy services at any time.    Brianna Joseph, PharmD  [phone]

## 2023-12-20 NOTE — ASSESSMENT & PLAN NOTE
Patients diabetes is uncontrolled as evidenced by her most recent A1c of 9.6% (Goal <7%) on 7/13/2023. Patient is due for an updated A1c but is worried about having to pay for the lab now that she does not have Medicaid. Encouraged the patient to call her medical insurance to see what labs and doctor visits are covered, as an A1c is a routine lab and patient should not have to pay an out of pocket copay.  RX CHANGES  START Mounjaro 2.5mg under the skin once a week. Patient received the medication from [facility] and will start once she is finished with Rybelsus.  STOP Rybelsus 14mg 1 tablet daily once her supply is gone. Patient states she has a little over a week left.  CONTINUE Jardiance 10mg daily, Pioglitazone 30mg daily, and Glimepiride 4mg 2 times a day.  Patient was able to get testing supplies through her insurance at her preferred pharmacy. Continue taking blood sugars 1-2 times daily.  Continue working towards healthy diet and lifestyle.

## 2023-12-22 RX ORDER — IBUPROFEN 600 MG/1
TABLET ORAL
Qty: 90 TABLET | Refills: 0 | Status: SHIPPED | OUTPATIENT
Start: 2023-12-22 | End: 2024-01-27

## 2023-12-23 RX ORDER — NABUMETONE 500 MG/1
500 TABLET, FILM COATED ORAL NIGHTLY PRN
Qty: 60 TABLET | Refills: 0 | Status: SHIPPED | OUTPATIENT
Start: 2023-12-23 | End: 2024-01-30

## 2024-01-23 DIAGNOSIS — R52 PAIN: ICD-10-CM

## 2024-01-23 DIAGNOSIS — M25.511 PAIN IN RIGHT SHOULDER: ICD-10-CM

## 2024-01-23 DIAGNOSIS — G60.9 IDIOPATHIC PERIPHERAL NEUROPATHY: ICD-10-CM

## 2024-01-24 ENCOUNTER — TELEMEDICINE (OUTPATIENT)
Dept: PHARMACY | Facility: HOSPITAL | Age: 54
End: 2024-01-24
Payer: COMMERCIAL

## 2024-01-24 DIAGNOSIS — E11.9 TYPE 2 DIABETES MELLITUS WITHOUT COMPLICATION, WITHOUT LONG-TERM CURRENT USE OF INSULIN (MULTI): Primary | ICD-10-CM

## 2024-01-24 DIAGNOSIS — E11.9 TYPE 2 DIABETES MELLITUS WITHOUT COMPLICATION, UNSPECIFIED WHETHER LONG TERM INSULIN USE (MULTI): ICD-10-CM

## 2024-01-24 PROCEDURE — RXMED WILLOW AMBULATORY MEDICATION CHARGE

## 2024-01-24 RX ORDER — TIRZEPATIDE 5 MG/.5ML
5 INJECTION, SOLUTION SUBCUTANEOUS
Qty: 2 ML | Refills: 0 | Status: SHIPPED | OUTPATIENT
Start: 2024-01-24 | End: 2024-02-28 | Stop reason: ALTCHOICE

## 2024-01-24 ASSESSMENT — ENCOUNTER SYMPTOMS: DIABETIC ASSOCIATED SYMPTOMS: 0

## 2024-01-24 NOTE — ASSESSMENT & PLAN NOTE
Patients diabetes is uncontrolled as evidenced by her most recent A1c of 9.6% (Goal <7%) on 7/13/2023. Patient is due for an updated A1c but is worried about having to pay for the lab now that she does not have Medicaid. Encouraged the patient to call her medical insurance to see what labs and doctor visits are covered, as an A1c is a routine lab and patient should not have to pay an out of pocket copay.  RX CHANGES  INCREASE Mounjaro 2.5mg under the skin once a week to Mounjaro 5mg under the skin once a week after 2 more doses (Saturdays).  CONTINUE Jardiance 10mg daily, Pioglitazone 30mg daily, and Glimepiride 4mg 2 times a day.  Patient was able to get testing supplies through her insurance at her preferred pharmacy. Continue taking blood sugars 1-2 times daily.  Continue working towards healthy diet and lifestyle.

## 2024-01-24 NOTE — PROGRESS NOTES
"Clinical Pharmacy Appointment    Subjective   Patient ID: Yane Lang is a 53 y.o. female who presents for Diabetes.    Referring Provider: Rj Nj,      Diabetes  She presents for her follow-up diabetic visit. She has type 2 diabetes mellitus. The initial diagnosis of diabetes was made 8 years ago. Her disease course has been improving. (Shaky, nausea) There are no diabetic associated symptoms. There are no hypoglycemic complications. Diabetic complications include peripheral neuropathy. Risk factors for coronary artery disease include tobacco exposure, obesity, diabetes mellitus, hypertension and dyslipidemia. Current diabetic treatments: Glimepiride 4mg 2 times a day, Jardiance 10mg daily, Actos 30mg daily, and Mounjaro 2.5mg once weekly. She is compliant with treatment all of the time (Patient is currently compliant with treatment, however is now worried about paying for medications now that she has commercial insurance.). Diabetic current diet: Typically 2 meals per day with a snack. Eats a lot of \"quick meals.\" Microwavable dinners, pizza, pizza rolls, hot dogs, chicken breast, etc. Patient states she drinks zero sugar KoolAid and Iced Team. When asked about meal planning, she reported none. Exercise: Patient states she is a  at a high school, so she gets movement in through walking up and down the halls, mopping, sweeping, vaccuming, etc. (Patient states she ran out of FreeStyle Sandra sensors. She will now be testing her blood sugars with finger sticks once daily. Patient reports recent morning blood sugars between 96-162mg/dL.) An ACE inhibitor/angiotensin II receptor blocker is being taken. She does not see a podiatrist.Eye exam is current.     Objective     Labs  Lab Results   Component Value Date    BILITOT 0.6 10/04/2023    CALCIUM 9.5 10/04/2023    CO2 24 10/04/2023     10/04/2023    CREATININE 0.77 10/04/2023    GLUCOSE 266 (H) 10/04/2023    ALKPHOS 88 10/04/2023    K 4.6 " 10/04/2023    PROT 7.4 10/04/2023     (L) 10/04/2023    AST 24 10/04/2023    ALT 27 10/04/2023    BUN 17 10/04/2023    ANIONGAP 16 10/04/2023    MG 1.82 10/04/2023    ALBUMIN 4.2 10/04/2023    LIPASE 12 03/16/2020    GFRF >90 04/22/2022     Lab Results   Component Value Date    TRIG 166 (H) 10/04/2023    CHOL 161 10/04/2023    LDLCALC 85 (L) 10/04/2023    HDL 43.2 10/04/2023     Lab Results   Component Value Date    HGBA1C 9.6 (A) 07/13/2023     Current Outpatient Medications on File Prior to Visit   Medication Sig Dispense Refill    acetaminophen (Tylenol 8 Hour) 650 mg ER tablet Take 1 tablet by mouth every 8 hours if needed for mild pain (1 - 3). Do not crush, chew, or split. 60 tablet 0    atorvastatin (Lipitor) 10 mg tablet Take 1 tablet (10 mg) by mouth once daily. 90 tablet 1    Banophen 25 mg capsule TAKE 2 CAPSULES BY MOUTH EVERY 4 HOURS 180 capsule 1    blood sugar diagnostic (OneTouch Verio test strips) strip USE 1 STRIP 3 TIMES DAILY. 100 strip 2    cholecalciferol (Vitamin D-3) 1,250 mcg (50,000 unit) capsule Take 1 capsule (50,000 Units) by mouth 1 (one) time per week. 12 capsule 1    dicyclomine (Bentyl) 10 mg capsule TAKE 1 CAPSULE BY MOUTH FOUR TIMES DAILY 120 capsule 1    empagliflozin (Jardiance) 10 mg Take 1 tablet (10 mg) by mouth once daily. 90 tablet 3    FreeStyle Sandra sensor system (FreeStyle Sandra 14 Day Sensor) kit As directed (Patient taking differently: every 14 (fourteen) days. As directed) 6 each 4    furosemide (Lasix) 20 mg tablet Take 1 tablet (20 mg) by mouth once daily. (Patient taking differently: Take 1 tablet (20 mg) by mouth once daily as needed (swollen).) 90 tablet 1    gabapentin (Neurontin) 400 mg capsule Take 1 capsule (400 mg) by mouth 4 times a day. 360 capsule 1    glimepiride (Amaryl) 4 mg tablet Take 1 tablet (4 mg) by mouth 2 times a day. 180 tablet 1    ibuprofen 600 mg tablet TAKE 1 TABLET BY MOUTH 3 TIMES A DAY AS NEEDED FOR PAIN 90 tablet 0    lancets  misc 3 times a day.      lidocaine (Lidoderm) 5 % patch APPLY ONE PATCH TO THE AFFECTED AREA(S) DAILY. 12 HOURS on, 12 HOURS off. 90 patch 1    lisinopril 10 mg tablet Take 1 tablet (10 mg) by mouth once daily. for blood pressure 90 tablet 1    methocarbamol (Robaxin) 500 mg tablet TAKE 1 TO 2 TABLETS BY MOUTH NIGHTLY 60 tablet 1    nabumetone (Relafen) 500 mg tablet Take 1 tablet (500 mg) by mouth as needed at bedtime for mild pain (1 - 3). 60 tablet 0    omeprazole (PriLOSEC) 20 mg DR capsule Take 1 capsule (20 mg) by mouth once daily. Do not crush or chew. 90 capsule 1    ondansetron ODT (Zofran-ODT) 4 mg disintegrating tablet Take 1 tablet (4 mg) by mouth every 6 hours if needed for nausea. DISSOLVE  ON THE TONGUE 25 tablet 5    pioglitazone (Actos) 30 mg tablet Take 1 tablet (30 mg) by mouth once daily. 90 tablet 1    semaglutide (Rybelsus) 14 mg tablet tablet Take 1 tablet (14 mg) by mouth once daily. 90 tablet 1    traZODone (Desyrel) 50 mg tablet Take 1 tablet (50 mg) by mouth once daily at bedtime. 90 tablet 1    [DISCONTINUED] tirzepatide (Mounjaro) 2.5 mg/0.5 mL pen injector Inject 2.5 mg under the skin every 7 days. 2 mL 0     No current facility-administered medications on file prior to visit.      Assessment/Plan   Diabetes Education  Rule of 15: eating ~15 g of carbs when BG less than 80 (half cup juice, 3-4 glucose tabs).  Recognize symptoms of high and low blood sugar.   Eat a realistic healthy diet consisting of fruits, vegetables, fiber, protein food choices on a regular basis and be aware of portion/serving sizes. Reduce carbohydrate consumption and always consume with protein and fat. Avoid foods high in saturated/trans fat, high salt content, and sweets and beverages with added sugars.  Limit alcohol consumption; alcohol may affect your blood sugar and cause hypoglycemia.   Stay active and incorporate ~30 mins of exercise into your daily routine to manage your weight and increase the body's  acceptance of insulin.    PATIENT GOALS   Fasting B - 130 mg/dL 2-HR Postprandial BG:   Less than 180 mg/dL A1c:   Less than 7.0 %     Mounjaro Education:   Counseled patient on MOA, expectations, side effects, duration of therapy, contraindications, administration, and monitoring parameters  Answered all patient questions and concerns  Counseled patient on Mounjaro MOA, expectations, side effects, duration of therapy, administration, and monitoring parameters.  Provided detailed dosing and administration counseling to ensure proper technique.   Reviewed Mounjaro titration schedule, starting with 2.5 mg once weekly to a goal of 15 mg once weekly if tolerated  Counseled patient on the benefits of GLP-1ra glycemic control and weight loss  Reviewed storage requirements of Mounjaro when not in use, and when to administer the medication if a dose is missed.  Advised patient that they may experience improved satiety after meals and portion sizes of meals may be reduced as doses of Mounjaro increase.    Problem List Items Addressed This Visit             ICD-10-CM    Type 2 diabetes mellitus without complication, without long-term current use of insulin (CMS/[facility]) - Primary E11.9     Patients diabetes is uncontrolled as evidenced by her most recent A1c of 9.6% (Goal <7%) on 2023. Patient is due for an updated A1c but is worried about having to pay for the lab now that she does not have Medicaid. Encouraged the patient to call her medical insurance to see what labs and doctor visits are covered, as an A1c is a routine lab and patient should not have to pay an out of pocket copay.  RX CHANGES  INCREASE Mounjaro 2.5mg under the skin once a week to Mounjaro 5mg under the skin once a week after 2 more doses ().  CONTINUE Jardiance 10mg daily, Pioglitazone 30mg daily, and Glimepiride 4mg 2 times a day.  Patient was able to get testing supplies through her insurance at her preferred pharmacy. Continue taking  blood sugars 1-2 times daily.  Continue working towards healthy diet and lifestyle.          Other Visit Diagnoses         Codes    Type 2 diabetes mellitus without complication, unspecified whether long term insulin use (CMS/[facility])     E11.9    Relevant Medications    tirzepatide (Mounjaro) 5 mg/0.5 mL pen injector    Other Relevant Orders    Follow Up In [facility] - Pharmacy          SMOKING  Discussed smoking cessation with patient. Patient states she has started to cut back on how many cigarettes she smokes per day. Currently, patient states she has about 5 cigarettes per day. Patient does not smoke in the house, but will smoke on her break from work, on her way home, and if it is nice she will sit on her back patio at home and smoke. Patient states she has tried NRT in the past, but has not had success. States the patches gave her a rash and she did not like the gum or the lozenges. Discussed the harm from smoking, and discussed that the patient is spending a lot of money on cigarettes. Patient states she will quit on her own without NRT.    MEDICATION COST  Patient lost her Medicaid on 10/31/2023 and is worried about medication cost  Patient was able to  all medications except Gabapentin prior to 11/1/2023  Patient went to the pharmacy and was able to  all her medications at an affordable price.    Follow up with Clinical Pharmacy Team 2/28/2024 at 10:30AM to discuss Mounjaro start.    Continue all meds under the continuation of care with the referring provider and clinical pharmacy team.    Please reach out to the Clinical Pharmacy Team if there are any further questions.     Verbal consent to manage patient's drug therapy was obtained from patient. They were informed they may decline to participate or withdraw from participation in pharmacy services at any time.    Brianna Joseph, PharmD  [phone]

## 2024-01-26 ENCOUNTER — PHARMACY VISIT (OUTPATIENT)
Dept: PHARMACY | Facility: CLINIC | Age: 54
End: 2024-01-26
Payer: MEDICARE

## 2024-01-27 RX ORDER — IBUPROFEN 600 MG/1
TABLET ORAL
Qty: 90 TABLET | Refills: 0 | Status: SHIPPED | OUTPATIENT
Start: 2024-01-27 | End: 2024-02-28

## 2024-01-27 RX ORDER — GABAPENTIN 400 MG/1
400 CAPSULE ORAL 4 TIMES DAILY
Qty: 120 CAPSULE | Refills: 1 | Status: SHIPPED | OUTPATIENT
Start: 2024-01-27 | End: 2024-04-09 | Stop reason: SDUPTHER

## 2024-01-30 DIAGNOSIS — E11.9 TYPE 2 DIABETES MELLITUS WITHOUT COMPLICATIONS (MULTI): ICD-10-CM

## 2024-01-30 DIAGNOSIS — K21.9 GASTRO-ESOPHAGEAL REFLUX DISEASE WITHOUT ESOPHAGITIS: ICD-10-CM

## 2024-01-30 DIAGNOSIS — G47.00 INSOMNIA, UNSPECIFIED: ICD-10-CM

## 2024-01-30 DIAGNOSIS — I10 ESSENTIAL (PRIMARY) HYPERTENSION: ICD-10-CM

## 2024-01-30 DIAGNOSIS — R60.0 EDEMA OF EXTREMITIES: ICD-10-CM

## 2024-01-30 DIAGNOSIS — E78.5 HYPERLIPIDEMIA, UNSPECIFIED: ICD-10-CM

## 2024-01-30 RX ORDER — PIOGLITAZONEHYDROCHLORIDE 30 MG/1
30 TABLET ORAL DAILY
Qty: 90 TABLET | Refills: 0 | Status: SHIPPED | OUTPATIENT
Start: 2024-01-30 | End: 2024-03-27 | Stop reason: SDUPTHER

## 2024-01-30 RX ORDER — LISINOPRIL 10 MG/1
10 TABLET ORAL DAILY
Qty: 90 TABLET | Refills: 0 | Status: SHIPPED | OUTPATIENT
Start: 2024-01-30 | End: 2024-04-09 | Stop reason: SDUPTHER

## 2024-01-30 RX ORDER — TRAZODONE HYDROCHLORIDE 50 MG/1
50 TABLET ORAL NIGHTLY
Qty: 90 TABLET | Refills: 0 | Status: SHIPPED | OUTPATIENT
Start: 2024-01-30 | End: 2024-04-09 | Stop reason: SDUPTHER

## 2024-01-30 RX ORDER — OMEPRAZOLE 20 MG/1
CAPSULE, DELAYED RELEASE ORAL
Qty: 90 CAPSULE | Refills: 0 | Status: SHIPPED | OUTPATIENT
Start: 2024-01-30 | End: 2024-04-09 | Stop reason: SDUPTHER

## 2024-01-30 RX ORDER — NABUMETONE 500 MG/1
500 TABLET, FILM COATED ORAL NIGHTLY PRN
Qty: 30 TABLET | Refills: 1 | Status: SHIPPED | OUTPATIENT
Start: 2024-01-30 | End: 2024-04-26 | Stop reason: SDUPTHER

## 2024-01-30 RX ORDER — ATORVASTATIN CALCIUM 10 MG/1
10 TABLET, FILM COATED ORAL DAILY
Qty: 90 TABLET | Refills: 0 | Status: SHIPPED | OUTPATIENT
Start: 2024-01-30 | End: 2024-04-09 | Stop reason: SDUPTHER

## 2024-01-31 ENCOUNTER — TELEPHONE (OUTPATIENT)
Dept: PRIMARY CARE | Facility: CLINIC | Age: 54
End: 2024-01-31
Payer: COMMERCIAL

## 2024-01-31 DIAGNOSIS — E78.5 DYSLIPIDEMIA: ICD-10-CM

## 2024-01-31 DIAGNOSIS — R53.83 FATIGUE, UNSPECIFIED TYPE: ICD-10-CM

## 2024-01-31 DIAGNOSIS — E55.9 VITAMIN D DEFICIENCY: ICD-10-CM

## 2024-01-31 DIAGNOSIS — E11.9 TYPE 2 DIABETES MELLITUS WITHOUT COMPLICATION, WITHOUT LONG-TERM CURRENT USE OF INSULIN (MULTI): ICD-10-CM

## 2024-01-31 NOTE — TELEPHONE ENCOUNTER
PT KALYAN RO     PT CALLED IN REQUESTING LAB ORDERS     CMP  LIPID  CBC  HEMO  THYROID  VITAMIN D

## 2024-02-25 DIAGNOSIS — M25.511 PAIN IN RIGHT SHOULDER: ICD-10-CM

## 2024-02-28 ENCOUNTER — TELEMEDICINE (OUTPATIENT)
Dept: PHARMACY | Facility: HOSPITAL | Age: 54
End: 2024-02-28
Payer: COMMERCIAL

## 2024-02-28 DIAGNOSIS — E66.01 MORBID OBESITY (MULTI): ICD-10-CM

## 2024-02-28 DIAGNOSIS — E11.9 TYPE 2 DIABETES MELLITUS WITHOUT COMPLICATION, UNSPECIFIED WHETHER LONG TERM INSULIN USE (MULTI): ICD-10-CM

## 2024-02-28 DIAGNOSIS — E11.9 TYPE 2 DIABETES MELLITUS WITHOUT COMPLICATIONS (MULTI): ICD-10-CM

## 2024-02-28 DIAGNOSIS — E11.9 TYPE 2 DIABETES MELLITUS WITHOUT COMPLICATION, WITHOUT LONG-TERM CURRENT USE OF INSULIN (MULTI): Primary | ICD-10-CM

## 2024-02-28 PROCEDURE — RXMED WILLOW AMBULATORY MEDICATION CHARGE

## 2024-02-28 RX ORDER — IBUPROFEN 600 MG/1
TABLET ORAL
Qty: 90 TABLET | Refills: 0 | Status: SHIPPED | OUTPATIENT
Start: 2024-02-28 | End: 2024-04-02

## 2024-02-28 RX ORDER — TIRZEPATIDE 7.5 MG/.5ML
7.5 INJECTION, SOLUTION SUBCUTANEOUS
Qty: 2 ML | Refills: 0 | Status: SHIPPED | OUTPATIENT
Start: 2024-02-28 | End: 2024-03-27 | Stop reason: SINTOL

## 2024-02-28 ASSESSMENT — ENCOUNTER SYMPTOMS: DIABETIC ASSOCIATED SYMPTOMS: 0

## 2024-02-28 NOTE — PROGRESS NOTES
"Clinical Pharmacy Appointment    Subjective   Patient ID: Yane Lang is a 53 y.o. female who presents for Diabetes.    Referring Provider: Rj Nj,      Diabetes  She presents for her follow-up diabetic visit. She has type 2 diabetes mellitus. The initial diagnosis of diabetes was made 8 years ago. Her disease course has been improving. (Shaky, nausea) There are no diabetic associated symptoms. There are no hypoglycemic complications. Diabetic complications include peripheral neuropathy. Risk factors for coronary artery disease include tobacco exposure, obesity, diabetes mellitus, hypertension and dyslipidemia. Current diabetic treatments: Glimepiride 4mg 2 times a day, Jardiance 10mg daily, Actos 30mg daily, and Mounjaro 5mg once weekly. She is compliant with treatment all of the time (Patient is currently compliant with treatment, however is now worried about paying for medications now that she has commercial insurance.). Diabetic current diet: Typically 2 meals per day with a snack. Eats a lot of \"quick meals.\" Microwavable dinners, pizza, pizza rolls, hot dogs, chicken breast, etc. Patient states she drinks zero sugar KoolAid and Iced Team. When asked about meal planning, she reported none. Exercise: Patient states she is a  at a high school, so she gets movement in through walking up and down the halls, mopping, sweeping, vaccuming, etc. (Patient states she ran out of MJH. She will now be testing her blood sugars with finger sticks once daily. Patient reports recent morning blood sugars between 96-162mg/dL.) An ACE inhibitor/angiotensin II receptor blocker is being taken. She does not see a podiatrist.Eye exam is current.     Objective     Labs  Lab Results   Component Value Date    BILITOT 0.6 10/04/2023    CALCIUM 9.5 10/04/2023    CO2 24 10/04/2023     10/04/2023    CREATININE 0.77 10/04/2023    GLUCOSE 266 (H) 10/04/2023    ALKPHOS 88 10/04/2023    K 4.6 " 10/04/2023    PROT 7.4 10/04/2023     (L) 10/04/2023    AST 24 10/04/2023    ALT 27 10/04/2023    BUN 17 10/04/2023    ANIONGAP 16 10/04/2023    MG 1.82 10/04/2023    ALBUMIN 4.2 10/04/2023    LIPASE 12 03/16/2020    GFRF >90 04/22/2022     Lab Results   Component Value Date    TRIG 166 (H) 10/04/2023    CHOL 161 10/04/2023    LDLCALC 85 (L) 10/04/2023    HDL 43.2 10/04/2023     Lab Results   Component Value Date    HGBA1C 9.6 (A) 07/13/2023     Current Outpatient Medications on File Prior to Visit   Medication Sig Dispense Refill    acetaminophen (Tylenol 8 Hour) 650 mg ER tablet Take 1 tablet by mouth every 8 hours if needed for mild pain (1 - 3). Do not crush, chew, or split. 60 tablet 0    atorvastatin (Lipitor) 10 mg tablet TAKE 1 TABLET BY MOUTH EVERY DAY 90 tablet 0    Banophen 25 mg capsule TAKE 2 CAPSULES BY MOUTH EVERY 4 HOURS 180 capsule 1    blood sugar diagnostic (OneTouch Verio test strips) strip USE 1 STRIP 3 TIMES DAILY. 100 strip 2    cholecalciferol (Vitamin D-3) 1,250 mcg (50,000 unit) capsule Take 1 capsule (50,000 Units) by mouth 1 (one) time per week. 12 capsule 1    dicyclomine (Bentyl) 10 mg capsule TAKE 1 CAPSULE BY MOUTH FOUR TIMES DAILY 120 capsule 1    FreeStyle Sandra sensor system (FreeStyle Sandra 14 Day Sensor) kit As directed (Patient taking differently: every 14 (fourteen) days. As directed) 6 each 4    furosemide (Lasix) 20 mg tablet Take 1 tablet (20 mg) by mouth once daily. (Patient taking differently: Take 1 tablet (20 mg) by mouth once daily as needed (swollen).) 90 tablet 1    gabapentin (Neurontin) 400 mg capsule TAKE 1 CAPSULE BY MOUTH 4 TIMES A  capsule 1    glimepiride (Amaryl) 4 mg tablet Take 1 tablet (4 mg) by mouth 2 times a day. 180 tablet 1    ibuprofen 600 mg tablet TAKE 1 TABLET BY MOUTH THREE TIMES A DAY AS NEEDED FOR PAIN 90 tablet 0    lancets misc 3 times a day.      lidocaine (Lidoderm) 5 % patch APPLY ONE PATCH TO THE AFFECTED AREA(S) DAILY. 12  HOURS on, 12 HOURS off. 90 patch 1    lisinopril 10 mg tablet TAKE 1 TABLET BY MOUTH EVERY DAY FOR BLOOD PRESSURE 90 tablet 0    methocarbamol (Robaxin) 500 mg tablet TAKE 1 TO 2 TABLETS BY MOUTH NIGHTLY 60 tablet 1    nabumetone (Relafen) 500 mg tablet TAKE 1 TABLET (500 MG) BY MOUTH AS NEEDED AT BEDTIME FOR MILD PAIN (1 - 3). 30 tablet 1    omeprazole (PriLOSEC) 20 mg DR capsule TAKE 1 CAPSULE BY MOUTH EVERY DAY DONT CRUSH OR CHEW 90 capsule 0    ondansetron ODT (Zofran-ODT) 4 mg disintegrating tablet Take 1 tablet (4 mg) by mouth every 6 hours if needed for nausea. DISSOLVE  ON THE TONGUE 25 tablet 5    pioglitazone (Actos) 30 mg tablet TAKE 1 TABLET BY MOUTH EVERY DAY 90 tablet 0    traZODone (Desyrel) 50 mg tablet TAKE 1 TABLET BY MOUTH AT BEDTIME 90 tablet 0    [DISCONTINUED] empagliflozin (Jardiance) 10 mg Take 1 tablet (10 mg) by mouth once daily. 90 tablet 3    [DISCONTINUED] semaglutide (Rybelsus) 14 mg tablet tablet Take 1 tablet (14 mg) by mouth once daily. 90 tablet 1    [DISCONTINUED] tirzepatide (Mounjaro) 5 mg/0.5 mL pen injector Inject 5 mg under the skin 1 (one) time per week. 2 mL 0     No current facility-administered medications on file prior to visit.      Assessment/Plan   Diabetes Education  Rule of 15: eating ~15 g of carbs when BG less than 80 (half cup juice, 3-4 glucose tabs).  Recognize symptoms of high and low blood sugar.   Eat a realistic healthy diet consisting of fruits, vegetables, fiber, protein food choices on a regular basis and be aware of portion/serving sizes. Reduce carbohydrate consumption and always consume with protein and fat. Avoid foods high in saturated/trans fat, high salt content, and sweets and beverages with added sugars.  Limit alcohol consumption; alcohol may affect your blood sugar and cause hypoglycemia.   Stay active and incorporate ~30 mins of exercise into your daily routine to manage your weight and increase the body's acceptance of insulin.    PATIENT  GOALS   Fasting B - 130 mg/dL 2-HR Postprandial BG:   Less than 180 mg/dL A1c:   Less than 7.0 %     Mounjaro Education:   Counseled patient on MOA, expectations, side effects, duration of therapy, contraindications, administration, and monitoring parameters  Answered all patient questions and concerns  Counseled patient on Mounjaro MOA, expectations, side effects, duration of therapy, administration, and monitoring parameters.  Provided detailed dosing and administration counseling to ensure proper technique.   Reviewed Mounjaro titration schedule, starting with 2.5 mg once weekly to a goal of 15 mg once weekly if tolerated  Counseled patient on the benefits of GLP-1ra glycemic control and weight loss  Reviewed storage requirements of Mounjaro when not in use, and when to administer the medication if a dose is missed.  Advised patient that they may experience improved satiety after meals and portion sizes of meals may be reduced as doses of Mounjaro increase.    Problem List Items Addressed This Visit             ICD-10-CM    Morbid obesity (CMS/[facility]) E66.01     Patient is interested in weight loss. States she has a friend who is taking Obvi weight loss supplement. Explained to the patient that Mounjaro could help with weight loss as well. Encouraged patient ot work towards healthy diet and lifestyle, as any medication or supplement will not work alone. Patient states she may have lost 4lbs or so since starting Mounjaro.         Type 2 diabetes mellitus without complication, without long-term current use of insulin (CMS/[facility]) - Primary E11.9     Patients diabetes is uncontrolled as evidenced by her most recent A1c of 9.6% (Goal <7%) on 2023. Patient is due for an updated A1c but is worried about having to pay for the lab now that she does not have Medicaid. Encouraged the patient to call her medical insurance to see what labs and doctor visits are covered, as an A1c is a routine lab and patient should  not have to pay an out of pocket copay.  RX CHANGES  INCREASE Mounjaro 5mg under the skin once a week to Mounjaro 7.5mg under the skin once a week after last dose on Saturday. Will send medication to [facility] for mail order.  CONTINUE Jardiance 10mg daily, Pioglitazone 30mg daily, and Glimepiride 4mg 2 times a day. Patient needs refill on Jardiance 10mg daily. Will send to [facility] for mail order.  Patient was able to get testing supplies through her insurance at her preferred pharmacy. Continue taking blood sugars 1-2 times daily.  Continue working towards healthy diet and lifestyle.         Relevant Medications    tirzepatide (Mounjaro) 7.5 mg/0.5 mL pen injector    Other Relevant Orders    Follow Up In [facility] - Pharmacy     Other Visit Diagnoses         Codes    Type 2 diabetes mellitus without complication, unspecified whether long term insulin use (CMS/[facility])     E11.9    Type 2 diabetes mellitus without complications (CMS/[facility])     E11.9    Relevant Medications    empagliflozin (Jardiance) 10 mg    Other Relevant Orders    Follow Up In [facility] - Pharmacy          SMOKING  Discussed smoking cessation with patient. Patient states she has started to cut back on how many cigarettes she smokes per day. Currently, patient states she has about 5 cigarettes per day. Patient does not smoke in the house, but will smoke on her break from work, on her way home, and if it is nice she will sit on her back patio at home and smoke. Patient states she has tried NRT in the past, but has not had success. States the patches gave her a rash and she did not like the gum or the lozenges. Discussed the harm from smoking, and discussed that the patient is spending a lot of money on cigarettes. Patient states she will quit on her own without NRT.    MEDICATION COST  Patient lost her Medicaid on 10/31/2023 and is worried about medication cost  Patient went to the pharmacy and was able to   all her medications at an affordable price.    Follow up with Clinical Pharmacy Team 3/27/2024 at 10:30AM to discuss Mounjaro start.    Continue all meds under the continuation of care with the referring provider and clinical pharmacy team.    Please reach out to the Clinical Pharmacy Team if there are any further questions.     Verbal consent to manage patient's drug therapy was obtained from patient. They were informed they may decline to participate or withdraw from participation in pharmacy services at any time.    Brianna Joseph, PharmD  [phone]

## 2024-02-28 NOTE — ASSESSMENT & PLAN NOTE
Patients diabetes is uncontrolled as evidenced by her most recent A1c of 9.6% (Goal <7%) on 7/13/2023. Patient is due for an updated A1c but is worried about having to pay for the lab now that she does not have Medicaid. Encouraged the patient to call her medical insurance to see what labs and doctor visits are covered, as an A1c is a routine lab and patient should not have to pay an out of pocket copay.  RX CHANGES  INCREASE Mounjaro 5mg under the skin once a week to Mounjaro 7.5mg under the skin once a week after last dose on Saturday. Will send medication to [facility] for mail order.  CONTINUE Jardiance 10mg daily, Pioglitazone 30mg daily, and Glimepiride 4mg 2 times a day. Patient needs refill on Jardiance 10mg daily. Will send to [facility] for mail order.  Patient was able to get testing supplies through her insurance at her preferred pharmacy. Continue taking blood sugars 1-2 times daily.  Continue working towards healthy diet and lifestyle.

## 2024-02-28 NOTE — ASSESSMENT & PLAN NOTE
Patient is interested in weight loss. States she has a friend who is taking Obvi weight loss supplement. Explained to the patient that Mounjaro could help with weight loss as well. Encouraged patient ot work towards healthy diet and lifestyle, as any medication or supplement will not work alone. Patient states she may have lost 4lbs or so since starting Mounjaro.

## 2024-03-01 ENCOUNTER — PHARMACY VISIT (OUTPATIENT)
Dept: PHARMACY | Facility: CLINIC | Age: 54
End: 2024-03-01
Payer: MEDICARE

## 2024-03-27 ENCOUNTER — TELEMEDICINE (OUTPATIENT)
Dept: PHARMACY | Facility: HOSPITAL | Age: 54
End: 2024-03-27
Payer: COMMERCIAL

## 2024-03-27 ENCOUNTER — PHARMACY VISIT (OUTPATIENT)
Dept: PHARMACY | Facility: CLINIC | Age: 54
End: 2024-03-27
Payer: MEDICARE

## 2024-03-27 DIAGNOSIS — E11.9 TYPE 2 DIABETES MELLITUS WITHOUT COMPLICATION, WITHOUT LONG-TERM CURRENT USE OF INSULIN (MULTI): Primary | ICD-10-CM

## 2024-03-27 DIAGNOSIS — E11.9 TYPE 2 DIABETES MELLITUS WITHOUT COMPLICATIONS (MULTI): ICD-10-CM

## 2024-03-27 PROCEDURE — RXMED WILLOW AMBULATORY MEDICATION CHARGE

## 2024-03-27 RX ORDER — PIOGLITAZONEHYDROCHLORIDE 30 MG/1
30 TABLET ORAL DAILY
Qty: 90 TABLET | Refills: 1 | Status: SHIPPED | OUTPATIENT
Start: 2024-03-27 | End: 2024-04-09 | Stop reason: SDUPTHER

## 2024-03-27 RX ORDER — GLIMEPIRIDE 4 MG/1
4 TABLET ORAL 2 TIMES DAILY
Qty: 180 TABLET | Refills: 1 | Status: SHIPPED | OUTPATIENT
Start: 2024-03-27 | End: 2024-04-09 | Stop reason: SDUPTHER

## 2024-03-27 RX ORDER — TIRZEPATIDE 5 MG/.5ML
5 INJECTION, SOLUTION SUBCUTANEOUS
Qty: 2 ML | Refills: 1 | Status: SHIPPED | OUTPATIENT
Start: 2024-03-27 | End: 2024-04-09 | Stop reason: SDUPTHER

## 2024-03-27 ASSESSMENT — ENCOUNTER SYMPTOMS: DIABETIC ASSOCIATED SYMPTOMS: 0

## 2024-03-27 NOTE — PROGRESS NOTES
"Clinical Pharmacy Appointment    Subjective   Patient ID: Yane Lang is a 53 y.o. female who presents for Diabetes.    Referring Provider: Rj Nj,      Diabetes  She presents for her follow-up diabetic visit. She has type 2 diabetes mellitus. The initial diagnosis of diabetes was made 8 years ago. Her disease course has been improving. (Shaky, nausea) There are no diabetic associated symptoms. There are no hypoglycemic complications. Diabetic complications include peripheral neuropathy. Risk factors for coronary artery disease include tobacco exposure, obesity, diabetes mellitus, hypertension and dyslipidemia. Current diabetic treatments: Glimepiride 4mg 2 times a day, Jardiance 10mg daily, Actos 30mg daily, and Mounjaro 7.5mg once weekly. She is compliant with treatment all of the time (Patient is currently compliant with treatment, however is now worried about paying for medications now that she has commercial insurance.). Diabetic current diet: Typically 2 meals per day with a snack. Eats a lot of \"quick meals.\" Microwavable dinners, pizza, pizza rolls, hot dogs, chicken breast, etc. Patient states she drinks zero sugar KoolAid and Iced Team. When asked about meal planning, she reported none. Exercise: Patient states she is a  at a high school, so she gets movement in through walking up and down the halls, mopping, sweeping, vaccuming, etc. (Patient states she ran out of FreeStyle Sandra sensors. She will now be testing her blood sugars with finger sticks once daily. Patient reports recent morning blood sugars between 118-162mg/dL.) An ACE inhibitor/angiotensin II receptor blocker is being taken. She does not see a podiatrist.Eye exam is current.     Objective     Labs  Lab Results   Component Value Date    BILITOT 0.6 10/04/2023    CALCIUM 9.5 10/04/2023    CO2 24 10/04/2023     10/04/2023    CREATININE 0.77 10/04/2023    GLUCOSE 266 (H) 10/04/2023    ALKPHOS 88 10/04/2023    K 4.6 " 10/04/2023    PROT 7.4 10/04/2023     (L) 10/04/2023    AST 24 10/04/2023    ALT 27 10/04/2023    BUN 17 10/04/2023    ANIONGAP 16 10/04/2023    MG 1.82 10/04/2023    ALBUMIN 4.2 10/04/2023    LIPASE 12 03/16/2020    GFRF >90 04/22/2022     Lab Results   Component Value Date    TRIG 166 (H) 10/04/2023    CHOL 161 10/04/2023    LDLCALC 85 (L) 10/04/2023    HDL 43.2 10/04/2023     Lab Results   Component Value Date    HGBA1C 9.6 (A) 07/13/2023     Current Outpatient Medications on File Prior to Visit   Medication Sig Dispense Refill    acetaminophen (Tylenol 8 Hour) 650 mg ER tablet Take 1 tablet by mouth every 8 hours if needed for mild pain (1 - 3). Do not crush, chew, or split. 60 tablet 0    atorvastatin (Lipitor) 10 mg tablet TAKE 1 TABLET BY MOUTH EVERY DAY 90 tablet 0    Banophen 25 mg capsule TAKE 2 CAPSULES BY MOUTH EVERY 4 HOURS 180 capsule 1    blood sugar diagnostic (OneTouch Verio test strips) strip USE 1 STRIP 3 TIMES DAILY. 100 strip 2    cholecalciferol (Vitamin D-3) 1,250 mcg (50,000 unit) capsule Take 1 capsule (50,000 Units) by mouth 1 (one) time per week. 12 capsule 1    dicyclomine (Bentyl) 10 mg capsule TAKE 1 CAPSULE BY MOUTH FOUR TIMES DAILY 120 capsule 1    empagliflozin (Jardiance) 10 mg Take 1 tablet (10 mg) by mouth once daily. 90 tablet 3    FreeStyle Sandra sensor system (FreeStyle Sandra 14 Day Sensor) kit As directed (Patient taking differently: every 14 (fourteen) days. As directed) 6 each 4    furosemide (Lasix) 20 mg tablet Take 1 tablet (20 mg) by mouth once daily. (Patient taking differently: Take 1 tablet (20 mg) by mouth once daily as needed (swollen).) 90 tablet 1    gabapentin (Neurontin) 400 mg capsule TAKE 1 CAPSULE BY MOUTH 4 TIMES A  capsule 1    ibuprofen 600 mg tablet TAKE 1 TABLET BY MOUTH THREE TIMES A DAY AS NEEDED FOR PAIN 90 tablet 0    lancets misc 3 times a day.      lidocaine (Lidoderm) 5 % patch APPLY ONE PATCH TO THE AFFECTED AREA(S) DAILY. 12 HOURS  on, 12 HOURS off. 90 patch 1    lisinopril 10 mg tablet TAKE 1 TABLET BY MOUTH EVERY DAY FOR BLOOD PRESSURE 90 tablet 0    methocarbamol (Robaxin) 500 mg tablet TAKE 1 TO 2 TABLETS BY MOUTH NIGHTLY 60 tablet 1    nabumetone (Relafen) 500 mg tablet TAKE 1 TABLET (500 MG) BY MOUTH AS NEEDED AT BEDTIME FOR MILD PAIN (1 - 3). 30 tablet 1    omeprazole (PriLOSEC) 20 mg DR capsule TAKE 1 CAPSULE BY MOUTH EVERY DAY DONT CRUSH OR CHEW 90 capsule 0    ondansetron ODT (Zofran-ODT) 4 mg disintegrating tablet Take 1 tablet (4 mg) by mouth every 6 hours if needed for nausea. DISSOLVE  ON THE TONGUE 25 tablet 5    traZODone (Desyrel) 50 mg tablet TAKE 1 TABLET BY MOUTH AT BEDTIME 90 tablet 0    [DISCONTINUED] glimepiride (Amaryl) 4 mg tablet Take 1 tablet (4 mg) by mouth 2 times a day. 180 tablet 1    [DISCONTINUED] pioglitazone (Actos) 30 mg tablet TAKE 1 TABLET BY MOUTH EVERY DAY 90 tablet 0    [DISCONTINUED] tirzepatide (Mounjaro) 7.5 mg/0.5 mL pen injector Inject 7.5 mg under the skin 1 (one) time per week. 2 mL 0     No current facility-administered medications on file prior to visit.      Assessment/Plan   Diabetes Education  Rule of 15: eating ~15 g of carbs when BG less than 80 (half cup juice, 3-4 glucose tabs).  Recognize symptoms of high and low blood sugar.   Eat a realistic healthy diet consisting of fruits, vegetables, fiber, protein food choices on a regular basis and be aware of portion/serving sizes. Reduce carbohydrate consumption and always consume with protein and fat. Avoid foods high in saturated/trans fat, high salt content, and sweets and beverages with added sugars.  Limit alcohol consumption; alcohol may affect your blood sugar and cause hypoglycemia.   Stay active and incorporate ~30 mins of exercise into your daily routine to manage your weight and increase the body's acceptance of insulin.    PATIENT GOALS   Fasting B - 130 mg/dL 2-HR Postprandial BG:   Less than 180 mg/dL A1c:   Less than 7.0  %     Mounjaro Education:   Counseled patient on MOA, expectations, side effects, duration of therapy, contraindications, administration, and monitoring parameters  Answered all patient questions and concerns  Counseled patient on Mounjaro MOA, expectations, side effects, duration of therapy, administration, and monitoring parameters.  Provided detailed dosing and administration counseling to ensure proper technique.   Reviewed Mounjaro titration schedule, starting with 2.5 mg once weekly to a goal of 15 mg once weekly if tolerated  Counseled patient on the benefits of GLP-1ra glycemic control and weight loss  Reviewed storage requirements of Mounjaro when not in use, and when to administer the medication if a dose is missed.  Advised patient that they may experience improved satiety after meals and portion sizes of meals may be reduced as doses of Mounjaro increase.    Problem List Items Addressed This Visit             ICD-10-CM    Type 2 diabetes mellitus without complication, without long-term current use of insulin (CMS/[facility]) - Primary E11.9     Patients diabetes is uncontrolled as evidenced by her most recent A1c of 9.6% (Goal <7%) on 7/13/2023. Patient is due for an updated A1c but is unable to get to the lab due to having one car between her and her daughter. Will try and go next week when daughter is on spring break.  RX CHANGES  DECREASE Mounjaro 7.5mg under the skin once a week back down to Mounjaro 5mg under the skin once a week after last dose on Saturday. Will send medication to [facility] for mail order. Patient has been experiencing nausea, and sulfur like belching.  CONTINUE Jardiance 10mg daily, Pioglitazone 30mg daily, and Glimepiride 4mg 2 times a day. Refills for Pioglitazone and Glimepiride sent to patients preferred pharmacy.  Patient was able to get testing supplies through her insurance at her preferred pharmacy. Continue taking blood sugars 1-2 times daily.  Continue working towards  healthy diet and lifestyle.         Relevant Medications    tirzepatide (Mounjaro) 5 mg/0.5 mL pen injector    Other Relevant Orders    Follow Up In [facility] - Pharmacy     Other Visit Diagnoses         Codes    Type 2 diabetes mellitus without complications (CMS/[facility])     E11.9    Relevant Medications    glimepiride (Amaryl) 4 mg tablet    pioglitazone (Actos) 30 mg tablet    Other Relevant Orders    Follow Up In [facility] - Pharmacy          SMOKING  Discussed smoking cessation with patient. Patient states she has started to cut back on how many cigarettes she smokes per day. Currently, patient states she has about 5 cigarettes per day. Patient does not smoke in the house, but will smoke on her break from work, on her way home, and if it is nice she will sit on her back patio at home and smoke. Patient states she has tried NRT in the past, but has not had success. States the patches gave her a rash and she did not like the gum or the lozenges. Discussed the harm from smoking, and discussed that the patient is spending a lot of money on cigarettes. Patient states she will quit on her own without NRT.    MEDICATION COST  Patient lost her Medicaid on 10/31/2023 and is worried about medication cost  Patient went to the pharmacy and was able to  all her medications at an affordable price.    Follow up with Clinical Pharmacy Team 4/24/2024 at 10:30AM to discuss Mounjaro start.    Continue all meds under the continuation of care with the referring provider and clinical pharmacy team.    Please reach out to the Clinical Pharmacy Team if there are any further questions.     Verbal consent to manage patient's drug therapy was obtained from patient. They were informed they may decline to participate or withdraw from participation in pharmacy services at any time.    Brianna Joseph, PharmD  [phone]

## 2024-03-27 NOTE — ASSESSMENT & PLAN NOTE
Patients diabetes is uncontrolled as evidenced by her most recent A1c of 9.6% (Goal <7%) on 7/13/2023. Patient is due for an updated A1c but is unable to get to the lab due to having one car between her and her daughter. Will try and go next week when daughter is on spring break.  RX CHANGES  DECREASE Mounjaro 7.5mg under the skin once a week back down to Mounjaro 5mg under the skin once a week after last dose on Saturday. Will send medication to [facility] for mail order. Patient has been experiencing nausea, and sulfur like belching.  CONTINUE Jardiance 10mg daily, Pioglitazone 30mg daily, and Glimepiride 4mg 2 times a day. Refills for Pioglitazone and Glimepiride sent to patients preferred pharmacy.  Patient was able to get testing supplies through her insurance at her preferred pharmacy. Continue taking blood sugars 1-2 times daily.  Continue working towards healthy diet and lifestyle.

## 2024-04-05 ENCOUNTER — LAB (OUTPATIENT)
Dept: LAB | Facility: LAB | Age: 54
End: 2024-04-05
Payer: COMMERCIAL

## 2024-04-05 DIAGNOSIS — E78.5 DYSLIPIDEMIA: ICD-10-CM

## 2024-04-05 DIAGNOSIS — E11.9 TYPE 2 DIABETES MELLITUS WITHOUT COMPLICATIONS (MULTI): ICD-10-CM

## 2024-04-05 DIAGNOSIS — R53.83 FATIGUE, UNSPECIFIED TYPE: ICD-10-CM

## 2024-04-05 DIAGNOSIS — E55.9 VITAMIN D DEFICIENCY: ICD-10-CM

## 2024-04-05 DIAGNOSIS — E11.9 TYPE 2 DIABETES MELLITUS WITHOUT COMPLICATION, WITHOUT LONG-TERM CURRENT USE OF INSULIN (MULTI): ICD-10-CM

## 2024-04-05 LAB
25(OH)D3 SERPL-MCNC: 18 NG/ML (ref 30–100)
ALBUMIN SERPL BCP-MCNC: 4.5 G/DL (ref 3.4–5)
ALP SERPL-CCNC: 103 U/L (ref 33–110)
ALT SERPL W P-5'-P-CCNC: 27 U/L (ref 7–45)
ANION GAP SERPL CALC-SCNC: 17 MMOL/L (ref 10–20)
AST SERPL W P-5'-P-CCNC: 21 U/L (ref 9–39)
BILIRUB SERPL-MCNC: 0.6 MG/DL (ref 0–1.2)
BUN SERPL-MCNC: 18 MG/DL (ref 6–23)
CALCIUM SERPL-MCNC: 9.4 MG/DL (ref 8.6–10.3)
CHLORIDE SERPL-SCNC: 99 MMOL/L (ref 98–107)
CO2 SERPL-SCNC: 24 MMOL/L (ref 21–32)
CREAT SERPL-MCNC: 0.73 MG/DL (ref 0.5–1.05)
EGFRCR SERPLBLD CKD-EPI 2021: >90 ML/MIN/1.73M*2
EST. AVERAGE GLUCOSE BLD GHB EST-MCNC: 209 MG/DL
GLUCOSE SERPL-MCNC: 267 MG/DL (ref 74–99)
HBA1C MFR BLD: 8.9 %
POTASSIUM SERPL-SCNC: 4.6 MMOL/L (ref 3.5–5.3)
PROT SERPL-MCNC: 7.3 G/DL (ref 6.4–8.2)
SODIUM SERPL-SCNC: 135 MMOL/L (ref 136–145)
TSH SERPL-ACNC: 2.36 MIU/L (ref 0.44–3.98)

## 2024-04-05 PROCEDURE — 80053 COMPREHEN METABOLIC PANEL: CPT

## 2024-04-05 PROCEDURE — 84443 ASSAY THYROID STIM HORMONE: CPT

## 2024-04-05 PROCEDURE — 83036 HEMOGLOBIN GLYCOSYLATED A1C: CPT

## 2024-04-05 PROCEDURE — 82306 VITAMIN D 25 HYDROXY: CPT

## 2024-04-05 PROCEDURE — 36415 COLL VENOUS BLD VENIPUNCTURE: CPT

## 2024-04-07 DIAGNOSIS — R52 PAIN: ICD-10-CM

## 2024-04-07 DIAGNOSIS — G60.9 IDIOPATHIC PERIPHERAL NEUROPATHY: ICD-10-CM

## 2024-04-08 RX ORDER — GABAPENTIN 400 MG/1
400 CAPSULE ORAL 4 TIMES DAILY
Qty: 120 CAPSULE | Refills: 1 | OUTPATIENT
Start: 2024-04-08

## 2024-04-09 ENCOUNTER — TELEPHONE (OUTPATIENT)
Dept: PRIMARY CARE | Facility: CLINIC | Age: 54
End: 2024-04-09
Payer: COMMERCIAL

## 2024-04-09 DIAGNOSIS — E78.5 HYPERLIPIDEMIA, UNSPECIFIED: ICD-10-CM

## 2024-04-09 DIAGNOSIS — G60.9 IDIOPATHIC PERIPHERAL NEUROPATHY: ICD-10-CM

## 2024-04-09 DIAGNOSIS — E11.9 TYPE 2 DIABETES MELLITUS WITHOUT COMPLICATION, WITHOUT LONG-TERM CURRENT USE OF INSULIN (MULTI): ICD-10-CM

## 2024-04-09 DIAGNOSIS — M25.511 PAIN IN RIGHT SHOULDER: ICD-10-CM

## 2024-04-09 DIAGNOSIS — I10 ESSENTIAL (PRIMARY) HYPERTENSION: ICD-10-CM

## 2024-04-09 DIAGNOSIS — E11.9 TYPE 2 DIABETES MELLITUS WITHOUT COMPLICATIONS (MULTI): ICD-10-CM

## 2024-04-09 DIAGNOSIS — R52 PAIN: ICD-10-CM

## 2024-04-09 DIAGNOSIS — K21.9 GASTRO-ESOPHAGEAL REFLUX DISEASE WITHOUT ESOPHAGITIS: ICD-10-CM

## 2024-04-09 DIAGNOSIS — G47.00 INSOMNIA, UNSPECIFIED: ICD-10-CM

## 2024-04-09 DIAGNOSIS — E55.9 VITAMIN D DEFICIENCY: ICD-10-CM

## 2024-04-09 NOTE — TELEPHONE ENCOUNTER
Dr. Nj Pt    Pt is scheduled for 4/22  Please send short script    methocarbamol (Robaxin) 500 mg tablet  gabapentin (Neurontin) 400 mg capsule    [id number] [place] [facility] [address] [place] [address]    **no one notified that she needed an office visit- pt is out of all meds    All other meds please send to    [facility] [place] - [facility] AT Portal to Registered [facility] Sites

## 2024-04-10 PROCEDURE — RXMED WILLOW AMBULATORY MEDICATION CHARGE

## 2024-04-11 ENCOUNTER — PHARMACY VISIT (OUTPATIENT)
Dept: PHARMACY | Facility: CLINIC | Age: 54
End: 2024-04-11
Payer: MEDICARE

## 2024-04-13 RX ORDER — ATORVASTATIN CALCIUM 10 MG/1
10 TABLET, FILM COATED ORAL DAILY
Qty: 90 TABLET | Refills: 0 | Status: SHIPPED | OUTPATIENT
Start: 2024-04-13

## 2024-04-13 RX ORDER — METHOCARBAMOL 500 MG/1
TABLET, FILM COATED ORAL
Qty: 60 TABLET | Refills: 0 | Status: SHIPPED | OUTPATIENT
Start: 2024-04-13 | End: 2024-04-22 | Stop reason: SDUPTHER

## 2024-04-13 RX ORDER — GLIMEPIRIDE 4 MG/1
4 TABLET ORAL 2 TIMES DAILY
Qty: 180 TABLET | Refills: 0 | Status: SHIPPED | OUTPATIENT
Start: 2024-04-13

## 2024-04-13 RX ORDER — TRAZODONE HYDROCHLORIDE 50 MG/1
50 TABLET ORAL NIGHTLY
Qty: 90 TABLET | Refills: 0 | Status: SHIPPED | OUTPATIENT
Start: 2024-04-13 | End: 2024-04-22 | Stop reason: SDUPTHER

## 2024-04-13 RX ORDER — LISINOPRIL 10 MG/1
10 TABLET ORAL DAILY
Qty: 90 TABLET | Refills: 0 | Status: SHIPPED | OUTPATIENT
Start: 2024-04-13 | End: 2024-04-22 | Stop reason: SDUPTHER

## 2024-04-13 RX ORDER — TIRZEPATIDE 5 MG/.5ML
5 INJECTION, SOLUTION SUBCUTANEOUS
Qty: 2 ML | Refills: 0 | Status: SHIPPED | OUTPATIENT
Start: 2024-04-14 | End: 2024-05-23 | Stop reason: SINTOL

## 2024-04-13 RX ORDER — OMEPRAZOLE 20 MG/1
CAPSULE, DELAYED RELEASE ORAL
Qty: 90 CAPSULE | Refills: 0 | Status: SHIPPED | OUTPATIENT
Start: 2024-04-13 | End: 2024-04-22 | Stop reason: SDUPTHER

## 2024-04-13 RX ORDER — PIOGLITAZONEHYDROCHLORIDE 30 MG/1
30 TABLET ORAL DAILY
Qty: 90 TABLET | Refills: 0 | Status: SHIPPED | OUTPATIENT
Start: 2024-04-13 | End: 2024-04-22 | Stop reason: SDUPTHER

## 2024-04-13 RX ORDER — ASPIRIN 325 MG
50000 TABLET, DELAYED RELEASE (ENTERIC COATED) ORAL
Qty: 12 CAPSULE | Refills: 0 | Status: SHIPPED | OUTPATIENT
Start: 2024-04-14

## 2024-04-13 RX ORDER — GABAPENTIN 400 MG/1
400 CAPSULE ORAL 4 TIMES DAILY
Qty: 120 CAPSULE | Refills: 0 | Status: SHIPPED | OUTPATIENT
Start: 2024-04-13 | End: 2024-05-13 | Stop reason: SDUPTHER

## 2024-04-22 ENCOUNTER — OFFICE VISIT (OUTPATIENT)
Dept: PRIMARY CARE | Facility: CLINIC | Age: 54
End: 2024-04-22
Payer: COMMERCIAL

## 2024-04-22 ENCOUNTER — HOSPITAL ENCOUNTER (OUTPATIENT)
Dept: RADIOLOGY | Facility: CLINIC | Age: 54
Discharge: HOME | End: 2024-04-22
Payer: COMMERCIAL

## 2024-04-22 VITALS
OXYGEN SATURATION: 96 % | TEMPERATURE: 98.5 F | DIASTOLIC BLOOD PRESSURE: 80 MMHG | HEIGHT: 63 IN | SYSTOLIC BLOOD PRESSURE: 118 MMHG | RESPIRATION RATE: 16 BRPM | WEIGHT: 291 LBS | HEART RATE: 92 BPM | BODY MASS INDEX: 51.56 KG/M2

## 2024-04-22 DIAGNOSIS — E78.5 HYPERLIPIDEMIA, UNSPECIFIED: ICD-10-CM

## 2024-04-22 DIAGNOSIS — M25.511 PAIN IN RIGHT SHOULDER: ICD-10-CM

## 2024-04-22 DIAGNOSIS — I10 ESSENTIAL (PRIMARY) HYPERTENSION: ICD-10-CM

## 2024-04-22 DIAGNOSIS — R60.0 EDEMA OF EXTREMITIES: ICD-10-CM

## 2024-04-22 DIAGNOSIS — K21.9 GASTRO-ESOPHAGEAL REFLUX DISEASE WITHOUT ESOPHAGITIS: ICD-10-CM

## 2024-04-22 DIAGNOSIS — M54.50 LUMBOSACRAL PAIN: ICD-10-CM

## 2024-04-22 DIAGNOSIS — G60.9 IDIOPATHIC PERIPHERAL NEUROPATHY: ICD-10-CM

## 2024-04-22 DIAGNOSIS — E11.9 TYPE 2 DIABETES MELLITUS WITHOUT COMPLICATIONS (MULTI): ICD-10-CM

## 2024-04-22 DIAGNOSIS — G47.00 INSOMNIA, UNSPECIFIED: ICD-10-CM

## 2024-04-22 DIAGNOSIS — R52 PAIN: ICD-10-CM

## 2024-04-22 PROCEDURE — 72170 X-RAY EXAM OF PELVIS: CPT | Performed by: RADIOLOGY

## 2024-04-22 PROCEDURE — 72100 X-RAY EXAM L-S SPINE 2/3 VWS: CPT

## 2024-04-22 PROCEDURE — 99214 OFFICE O/P EST MOD 30 MIN: CPT | Performed by: FAMILY MEDICINE

## 2024-04-22 PROCEDURE — 72170 X-RAY EXAM OF PELVIS: CPT

## 2024-04-22 PROCEDURE — 72100 X-RAY EXAM L-S SPINE 2/3 VWS: CPT | Performed by: RADIOLOGY

## 2024-04-22 RX ORDER — OMEPRAZOLE 20 MG/1
CAPSULE, DELAYED RELEASE ORAL
Qty: 90 CAPSULE | Refills: 0 | Status: SHIPPED | OUTPATIENT
Start: 2024-04-22

## 2024-04-22 RX ORDER — NABUMETONE 500 MG/1
500 TABLET, FILM COATED ORAL NIGHTLY PRN
Qty: 30 TABLET | Refills: 1 | Status: CANCELLED | OUTPATIENT
Start: 2024-04-22

## 2024-04-22 RX ORDER — TRAZODONE HYDROCHLORIDE 50 MG/1
50 TABLET ORAL NIGHTLY
Qty: 90 TABLET | Refills: 0 | Status: SHIPPED | OUTPATIENT
Start: 2024-04-22

## 2024-04-22 RX ORDER — IBUPROFEN 600 MG/1
TABLET ORAL
Qty: 90 TABLET | Refills: 0 | Status: SHIPPED | OUTPATIENT
Start: 2024-04-22

## 2024-04-22 RX ORDER — GABAPENTIN 400 MG/1
400 CAPSULE ORAL 4 TIMES DAILY
Qty: 120 CAPSULE | Refills: 0 | Status: CANCELLED | OUTPATIENT
Start: 2024-04-22

## 2024-04-22 RX ORDER — PIOGLITAZONEHYDROCHLORIDE 45 MG/1
45 TABLET ORAL DAILY
Qty: 90 TABLET | Refills: 0 | Status: SHIPPED | OUTPATIENT
Start: 2024-04-22 | End: 2024-05-13 | Stop reason: SDUPTHER

## 2024-04-22 RX ORDER — METHOCARBAMOL 500 MG/1
TABLET, FILM COATED ORAL
Qty: 60 TABLET | Refills: 0 | Status: SHIPPED | OUTPATIENT
Start: 2024-04-22

## 2024-04-22 RX ORDER — LISINOPRIL 10 MG/1
10 TABLET ORAL DAILY
Qty: 90 TABLET | Refills: 0 | Status: SHIPPED | OUTPATIENT
Start: 2024-04-22

## 2024-04-22 RX ORDER — DEXTROMETHORPHAN HYDROBROMIDE, GUAIFENESIN 5; 100 MG/5ML; MG/5ML
LIQUID ORAL
Qty: 60 TABLET | Refills: 0 | Status: SHIPPED | OUTPATIENT
Start: 2024-04-22

## 2024-04-22 ASSESSMENT — ENCOUNTER SYMPTOMS
CHEST TIGHTNESS: 0
ABDOMINAL DISTENTION: 1
SINUS PRESSURE: 0
ABDOMINAL PAIN: 0
EYE PAIN: 0
DECREASED CONCENTRATION: 0
ARTHRALGIAS: 0
DYSPHORIC MOOD: 0
NECK STIFFNESS: 0
ACTIVITY CHANGE: 0
AGITATION: 0
HEADACHES: 0
SHORTNESS OF BREATH: 0
PALPITATIONS: 0
TROUBLE SWALLOWING: 0
POLYPHAGIA: 0
RECTAL PAIN: 0
DYSURIA: 0
ADENOPATHY: 0
SPEECH DIFFICULTY: 0
RHINORRHEA: 0
PHOTOPHOBIA: 0
SLEEP DISTURBANCE: 0
DIZZINESS: 0
COUGH: 0
SINUS PAIN: 0
CONSTIPATION: 0
BACK PAIN: 1
APPETITE CHANGE: 0
FLANK PAIN: 0
NERVOUS/ANXIOUS: 0
STRIDOR: 0
MYALGIAS: 1
CONFUSION: 0
FATIGUE: 0
DIARRHEA: 0
POLYDIPSIA: 0
BLOOD IN STOOL: 0
SORE THROAT: 0
CONSTITUTIONAL NEGATIVE: 1
SEIZURES: 0
HEMATURIA: 0
COLOR CHANGE: 0
FEVER: 0

## 2024-04-22 NOTE — PATIENT INSTRUCTIONS
Follow up in 3 months    Continue current medications and therapy for chronic medical conditions.    Patient was advised importance of proper diet/nutrition in addition adequate hydration. Patient was encouraged moderate exercise program to include 30 minutes daily for 5 days of the week or 150 minutes weekly. Patient will follow-up with us as scheduled.    Increase actos to 45 mg    Increase jardiance to 25 mg     Start ozempic 0.25 mg weekly    XR lumbar and pelvis ordered

## 2024-04-22 NOTE — PROGRESS NOTES
"Subjective   Patient ID: Yane Lang is a 53 y.o. female who presents for Diabetes.    Patient presents in office for a follow up of DM 2. Patient is currently being prescribed Glimepiride, Jardiance, Pioglitazone and Mounjaro. Patient admits she has been experiencing intermittent nausea after administering her weekly mounjaro injection.        Review of Systems   Constitutional: Negative.  Negative for activity change, appetite change, fatigue and fever.   HENT:  Negative for congestion, dental problem, ear discharge, ear pain, mouth sores, rhinorrhea, sinus pressure, sinus pain, sore throat, tinnitus and trouble swallowing.    Eyes:  Negative for photophobia, pain and visual disturbance.   Respiratory:  Negative for cough, chest tightness, shortness of breath and stridor.    Cardiovascular:  Negative for chest pain and palpitations.   Gastrointestinal:  Positive for abdominal distention. Negative for abdominal pain, blood in stool, constipation, diarrhea and rectal pain.   Endocrine: Negative for cold intolerance, heat intolerance, polydipsia, polyphagia and polyuria.   Genitourinary:  Negative for dysuria, flank pain, hematuria and urgency.   Musculoskeletal:  Positive for back pain, gait problem and myalgias. Negative for arthralgias and neck stiffness.   Skin:  Negative for color change and rash.   Allergic/Immunologic: Negative for environmental allergies and food allergies.   Neurological:  Negative for dizziness, seizures, syncope, speech difficulty and headaches.   Hematological:  Negative for adenopathy.   Psychiatric/Behavioral:  Negative for agitation, confusion, decreased concentration, dysphoric mood and sleep disturbance. The patient is not nervous/anxious.        Objective   /80 (BP Location: Left arm, Patient Position: Sitting, BP Cuff Size: Large adult)   Pulse 92   Temp 36.9 °C (98.5 °F) (Temporal)   Resp 16   Ht 1.6 m (5' 3\")   Wt 132 kg (291 lb)   SpO2 96%   BMI 51.55 kg/m² "     Physical Exam  Vitals reviewed.   Constitutional:       General: She is not in acute distress.     Appearance: She is obese. She is not ill-appearing or diaphoretic.   HENT:      Head: Normocephalic.      Right Ear: Tympanic membrane and external ear normal.      Left Ear: Tympanic membrane and external ear normal.      Nose: Nose normal. No congestion.      Mouth/Throat:      Pharynx: No posterior oropharyngeal erythema.   Eyes:      General:         Right eye: No discharge.         Left eye: No discharge.      Extraocular Movements: Extraocular movements intact.      Conjunctiva/sclera: Conjunctivae normal.      Pupils: Pupils are equal, round, and reactive to light.   Cardiovascular:      Rate and Rhythm: Normal rate and regular rhythm.      Pulses: Normal pulses.      Heart sounds: Normal heart sounds. No murmur heard.  Pulmonary:      Effort: Pulmonary effort is normal. No respiratory distress.      Breath sounds: Normal breath sounds. No wheezing or rales.   Chest:      Chest wall: No tenderness.   Abdominal:      General: Bowel sounds are normal. There is distension.      Palpations: There is no mass.      Tenderness: There is no abdominal tenderness. There is no guarding.   Musculoskeletal:         General: Tenderness present. Normal range of motion.      Cervical back: Normal range of motion and neck supple. No tenderness.      Right lower leg: No edema.      Left lower leg: No edema.   Skin:     General: Skin is dry.      Coloration: Skin is not jaundiced.      Findings: No bruising, erythema or rash.   Neurological:      General: No focal deficit present.      Mental Status: She is alert and oriented to person, place, and time. Mental status is at baseline.      Cranial Nerves: No cranial nerve deficit.      Sensory: No sensory deficit.      Coordination: Coordination normal.      Gait: Gait abnormal.   Psychiatric:         Mood and Affect: Mood normal.         Thought Content: Thought content normal.          Judgment: Judgment normal.         Assessment/Plan   Problem List Items Addressed This Visit             ICD-10-CM    Peripheral neuropathy G62.9    Relevant Medications    acetaminophen (Tylenol 8 HOUR) 650 mg ER tablet    Edema of extremities R60.0     Other Visit Diagnoses         Codes    Type 2 diabetes mellitus without complications (Multi)     E11.9    Relevant Medications    empagliflozin (Jardiance) 25 mg    pioglitazone (Actos) 45 mg tablet    semaglutide 0.25 mg or 0.5 mg (2 mg/3 mL) pen injector (Start on 4/28/2024)    Other Relevant Orders    POCT fingerstick glucose manually resulted    Essential (primary) hypertension     I10    Relevant Medications    lisinopril 10 mg tablet    Pain     R52    Relevant Medications    acetaminophen (Tylenol 8 HOUR) 650 mg ER tablet    methocarbamol (Robaxin) 500 mg tablet    Pain in right shoulder     M25.511    Relevant Medications    acetaminophen (Tylenol 8 HOUR) 650 mg ER tablet    ibuprofen 600 mg tablet    methocarbamol (Robaxin) 500 mg tablet    Hyperlipidemia, unspecified     E78.5    Gastro-esophageal reflux disease without esophagitis     K21.9    Relevant Medications    omeprazole (PriLOSEC) 20 mg DR capsule    Insomnia, unspecified     G47.00    Relevant Medications    traZODone (Desyrel) 50 mg tablet    Lumbosacral pain     M54.50    Relevant Orders    XR lumbar spine 2-3 views    XR pelvis 1-2 views          Scribe Attestation  By signing my name below, I, Rj Nj DO , Conc[place]   attest that this documentation has been prepared under the direction and in the presence of Rj Nj DO.    Provider Attestation - Scribe documentation    All medical record entries made by the Scribe were at my direction and personally dictated by me. I have reviewed the chart and agree that the record accurately reflects my personal performance of the history, physical exam, discussion and plan.

## 2024-04-23 ENCOUNTER — TELEPHONE (OUTPATIENT)
Dept: PRIMARY CARE | Facility: CLINIC | Age: 54
End: 2024-04-23
Payer: COMMERCIAL

## 2024-04-23 DIAGNOSIS — E11.9 TYPE 2 DIABETES MELLITUS WITHOUT COMPLICATIONS (MULTI): ICD-10-CM

## 2024-04-23 DIAGNOSIS — R60.0 EDEMA OF EXTREMITIES: ICD-10-CM

## 2024-04-23 RX ORDER — NABUMETONE 500 MG/1
500 TABLET, FILM COATED ORAL NIGHTLY PRN
Qty: 30 TABLET | Refills: 1 | OUTPATIENT
Start: 2024-04-23

## 2024-04-24 ENCOUNTER — TELEMEDICINE (OUTPATIENT)
Dept: PHARMACY | Facility: HOSPITAL | Age: 54
End: 2024-04-24
Payer: COMMERCIAL

## 2024-04-24 DIAGNOSIS — E11.9 TYPE 2 DIABETES MELLITUS WITHOUT COMPLICATION, WITHOUT LONG-TERM CURRENT USE OF INSULIN (MULTI): Primary | ICD-10-CM

## 2024-04-24 DIAGNOSIS — E11.9 TYPE 2 DIABETES MELLITUS WITHOUT COMPLICATIONS (MULTI): ICD-10-CM

## 2024-04-24 ASSESSMENT — ENCOUNTER SYMPTOMS: DIABETIC ASSOCIATED SYMPTOMS: 0

## 2024-04-24 NOTE — ASSESSMENT & PLAN NOTE
Patients diabetes is uncontrolled as evidenced by her most recent A1c of 8.9% (Goal <7%) on 4/5/2024. This was a decrease in A1c, congratulated patient  RX CHANGES  CONTINUE Glimepiride 4mg twice a day, Pioglitazone 45mg daily (recently increased), and Mounjaro 5mg weekly  START Jardiance 25mg daily. Will send to [facility] for mail order.  Patient was able to get testing supplies through her insurance at her preferred pharmacy. Continue taking blood sugars 1-2 times daily.  Continue working towards healthy diet and lifestyle.

## 2024-04-24 NOTE — TELEPHONE ENCOUNTER
Pt calling again in regards to nabumetone, she says she did mention this at OV on Monday with CJ- she did not realize she was only supposed to be taking one of these at a time and ran out sooner than she was supposed to due to taking two at a time. She did say two at a time was helping, one a day was not. Can we please send this refill over for two a day?   [facility]/pharmacy #[id number] - [place], [place] - [address] [place] [address]

## 2024-04-24 NOTE — PROGRESS NOTES
"Clinical Pharmacy Appointment    Subjective   Patient ID: Yane Lang is a 53 y.o. female who presents for Diabetes.    Referring Provider: Rj Nj,      Diabetes  She presents for her follow-up diabetic visit. She has type 2 diabetes mellitus. The initial diagnosis of diabetes was made 8 years ago. Her disease course has been improving. (Shaky, nausea) There are no diabetic associated symptoms. There are no hypoglycemic complications. Diabetic complications include peripheral neuropathy. Risk factors for coronary artery disease include tobacco exposure, obesity, diabetes mellitus, hypertension and dyslipidemia. Current diabetic treatments: Glimepiride 4mg 2 times a day, Jardiance 10mg daily, Actos 30mg daily, and Mounjaro 5mg once weekly. She is compliant with treatment all of the time (Patient is currently compliant with treatment, however is now worried about paying for medications now that she has commercial insurance.). Diabetic current diet: Typically 2 meals per day with a snack. Eats a lot of \"quick meals.\" Microwavable dinners, pizza, pizza rolls, hot dogs, chicken breast, etc. Patient states she drinks zero sugar KoolAid and Iced Team. When asked about meal planning, she reported none. Exercise: Patient states she is a  at a high school, so she gets movement in through walking up and down the halls, mopping, sweeping, vaccuming, etc. (Patient states she ran out of AstroloMe. She will now be testing her blood sugars with finger sticks once daily. Patient reports recent morning blood sugars between 118-162mg/dL.) An ACE inhibitor/angiotensin II receptor blocker is being taken. She does not see a podiatrist.Eye exam is current.     Objective     Labs  Lab Results   Component Value Date    BILITOT 0.6 04/05/2024    CALCIUM 9.4 04/05/2024    CO2 24 04/05/2024    CL 99 04/05/2024    CREATININE 0.73 04/05/2024    GLUCOSE 267 (H) 04/05/2024    ALKPHOS 103 04/05/2024    K 4.6 " 04/05/2024    PROT 7.3 04/05/2024     (L) 04/05/2024    AST 21 04/05/2024    ALT 27 04/05/2024    BUN 18 04/05/2024    ANIONGAP 17 04/05/2024    MG 1.82 10/04/2023    ALBUMIN 4.5 04/05/2024    LIPASE 12 03/16/2020    GFRF >90 04/22/2022     Lab Results   Component Value Date    TRIG 166 (H) 10/04/2023    CHOL 161 10/04/2023    LDLCALC 85 (L) 10/04/2023    HDL 43.2 10/04/2023     Lab Results   Component Value Date    HGBA1C 8.9 (H) 04/05/2024     Current Outpatient Medications on File Prior to Visit   Medication Sig Dispense Refill    acetaminophen (Tylenol 8 HOUR) 650 mg ER tablet Take 1 tablet by mouth every 8 hours if needed for mild pain (1 - 3). Do not crush, chew, or split. 60 tablet 0    atorvastatin (Lipitor) 10 mg tablet Take 1 tablet (10 mg) by mouth once daily. 90 tablet 0    Banophen 25 mg capsule TAKE 2 CAPSULES BY MOUTH EVERY 4 HOURS 180 capsule 1    blood sugar diagnostic (OneTouch Verio test strips) strip USE 1 STRIP 3 TIMES DAILY. 100 strip 2    cholecalciferol (Vitamin D-3) 50,000 unit capsule Take 1 capsule (50,000 Units) by mouth 1 (one) time per week. 12 capsule 0    dicyclomine (Bentyl) 10 mg capsule TAKE 1 CAPSULE BY MOUTH FOUR TIMES DAILY 120 capsule 1    FreeStyle Sandra sensor system (FreeStyle Sandra 14 Day Sensor) kit As directed (Patient taking differently: every 14 (fourteen) days. As directed) 6 each 4    furosemide (Lasix) 20 mg tablet Take 1 tablet (20 mg) by mouth once daily. (Patient taking differently: Take 1 tablet (20 mg) by mouth once daily as needed (swollen).) 90 tablet 1    gabapentin (Neurontin) 400 mg capsule Take 1 capsule (400 mg) by mouth 4 times a day. 120 capsule 0    glimepiride (Amaryl) 4 mg tablet Take 1 tablet (4 mg) by mouth 2 times a day. 180 tablet 0    ibuprofen 600 mg tablet TAKE 1 TABLET BY MOUTH THREE TIMES A DAY AS NEEDED FOR PAIN 90 tablet 0    lancets misc 3 times a day.      lidocaine (Lidoderm) 5 % patch APPLY ONE PATCH TO THE AFFECTED AREA(S)  DAILY. 12 HOURS on, 12 HOURS off. 90 patch 1    lisinopril 10 mg tablet Take 1 tablet (10 mg) by mouth once daily. for blood pressure 90 tablet 0    methocarbamol (Robaxin) 500 mg tablet TAKE 1 TO 2 TABLETS BY MOUTH NIGHTLY 60 tablet 0    nabumetone (Relafen) 500 mg tablet Take 1 tablet (500 mg) by mouth 2 times a day. 60 tablet 2    omeprazole (PriLOSEC) 20 mg DR capsule TAKE 1 CAPSULE BY MOUTH EVERY DAY DONT CRUSH OR CHEW 90 capsule 0    ondansetron ODT (Zofran-ODT) 4 mg disintegrating tablet Take 1 tablet (4 mg) by mouth every 6 hours if needed for nausea. DISSOLVE  ON THE TONGUE 25 tablet 5    pioglitazone (Actos) 45 mg tablet Take 1 tablet (45 mg) by mouth once daily. 90 tablet 0    semaglutide 0.25 mg or 0.5 mg (2 mg/3 mL) pen injector Inject 0.25 mg under the skin 1 (one) time per week. 5 mL 0    tirzepatide (Mounjaro) 5 mg/0.5 mL pen injector Inject 5 mg under the skin 1 (one) time per week. 2 mL 0    traZODone (Desyrel) 50 mg tablet Take 1 tablet (50 mg) by mouth once daily at bedtime. 90 tablet 0    [DISCONTINUED] empagliflozin (Jardiance) 25 mg Take 1 tablet (25 mg) by mouth once daily. 90 tablet 0    [DISCONTINUED] nabumetone (Relafen) 500 mg tablet TAKE 1 TABLET (500 MG) BY MOUTH AS NEEDED AT BEDTIME FOR MILD PAIN (1 - 3). 30 tablet 1    [DISCONTINUED] semaglutide 0.25 mg or 0.5 mg (2 mg/3 mL) pen injector Inject 0.25 mg under the skin 1 (one) time per week. 3 mL 0     No current facility-administered medications on file prior to visit.      Assessment/Plan   Diabetes Education  Rule of 15: eating ~15 g of carbs when BG less than 80 (half cup juice, 3-4 glucose tabs).  Recognize symptoms of high and low blood sugar.   Eat a realistic healthy diet consisting of fruits, vegetables, fiber, protein food choices on a regular basis and be aware of portion/serving sizes. Reduce carbohydrate consumption and always consume with protein and fat. Avoid foods high in saturated/trans fat, high salt content, and  sweets and beverages with added sugars.  Limit alcohol consumption; alcohol may affect your blood sugar and cause hypoglycemia.   Stay active and incorporate ~30 mins of exercise into your daily routine to manage your weight and increase the body's acceptance of insulin.    PATIENT GOALS   Fasting B - 130 mg/dL 2-HR Postprandial BG:   Less than 180 mg/dL A1c:   Less than 7.0 %     Mounjaro Education:   Counseled patient on MOA, expectations, side effects, duration of therapy, contraindications, administration, and monitoring parameters  Answered all patient questions and concerns  Counseled patient on Mounjaro MOA, expectations, side effects, duration of therapy, administration, and monitoring parameters.  Provided detailed dosing and administration counseling to ensure proper technique.   Reviewed Mounjaro titration schedule, starting with 2.5 mg once weekly to a goal of 15 mg once weekly if tolerated  Counseled patient on the benefits of GLP-1ra glycemic control and weight loss  Reviewed storage requirements of Mounjaro when not in use, and when to administer the medication if a dose is missed.  Advised patient that they may experience improved satiety after meals and portion sizes of meals may be reduced as doses of Mounjaro increase.    Problem List Items Addressed This Visit             ICD-10-CM    Type 2 diabetes mellitus without complication, without long-term current use of insulin (Multi) - Primary E11.9     Patients diabetes is uncontrolled as evidenced by her most recent A1c of 8.9% (Goal <7%) on 2024. This was a decrease in A1c, congratulated patient  RX CHANGES  CONTINUE Glimepiride 4mg twice a day, Pioglitazone 45mg daily (recently increased), and Mounjaro 5mg weekly  START Jardiance 25mg daily. Will send to [facility] for mail order.  Patient was able to get testing supplies through her insurance at her preferred pharmacy. Continue taking blood sugars 1-2 times daily.  Continue  working towards healthy diet and lifestyle.         Relevant Orders    Follow Up In [facility] - Pharmacy     Other Visit Diagnoses         Codes    Type 2 diabetes mellitus without complications (Multi)     E11.9    Relevant Medications    empagliflozin (Jardiance) 25 mg    Other Relevant Orders    Follow Up In [facility] - Pharmacy            SMOKING  Discussed smoking cessation with patient. Patient states she has started to cut back on how many cigarettes she smokes per day. Currently, patient states she has about 5 cigarettes per day. Patient does not smoke in the house, but will smoke on her break from work, on her way home, and if it is nice she will sit on her back patio at home and smoke. Patient states she has tried NRT in the past, but has not had success. States the patches gave her a rash and she did not like the gum or the lozenges. Discussed the harm from smoking, and discussed that the patient is spending a lot of money on cigarettes. Patient states she will quit on her own without NRT.    MEDICATION COST  Patient lost her Medicaid on 10/31/2023 and is worried about medication cost  Patient went to the pharmacy and was able to  all her medications at an affordable price.    Follow up with Clinical Pharmacy Team 5/22/2024 at 10:30AM    Continue all meds under the continuation of care with the referring provider and clinical pharmacy team.    Please reach out to the Clinical Pharmacy Team if there are any further questions.     Verbal consent to manage patient's drug therapy was obtained from patient. They were informed they may decline to participate or withdraw from participation in pharmacy services at any time.    Brianna Joseph, PharmD  [phone]

## 2024-04-27 RX ORDER — NABUMETONE 500 MG/1
500 TABLET, FILM COATED ORAL 2 TIMES DAILY
Qty: 60 TABLET | Refills: 2 | Status: SHIPPED | OUTPATIENT
Start: 2024-04-27

## 2024-05-13 DIAGNOSIS — R52 PAIN: ICD-10-CM

## 2024-05-13 DIAGNOSIS — G60.9 IDIOPATHIC PERIPHERAL NEUROPATHY: ICD-10-CM

## 2024-05-13 DIAGNOSIS — E11.9 TYPE 2 DIABETES MELLITUS WITHOUT COMPLICATIONS (MULTI): ICD-10-CM

## 2024-05-13 NOTE — TELEPHONE ENCOUNTER
gabapentin (Neurontin) 400 mg capsule     pioglitazone (Actos) 45 mg tablet     [facility]       LOV 4/22/2024    PLEASE SEND 90 DAY SUPPLIES WITH REFILLS

## 2024-05-15 RX ORDER — PIOGLITAZONEHYDROCHLORIDE 45 MG/1
45 TABLET ORAL DAILY
Qty: 90 TABLET | Refills: 0 | Status: SHIPPED | OUTPATIENT
Start: 2024-05-15

## 2024-05-15 RX ORDER — GABAPENTIN 400 MG/1
400 CAPSULE ORAL 4 TIMES DAILY
Qty: 360 CAPSULE | Refills: 0 | Status: SHIPPED | OUTPATIENT
Start: 2024-05-15

## 2024-05-22 ENCOUNTER — TELEMEDICINE (OUTPATIENT)
Dept: PHARMACY | Facility: HOSPITAL | Age: 54
End: 2024-05-22
Payer: COMMERCIAL

## 2024-05-22 DIAGNOSIS — E11.9 TYPE 2 DIABETES MELLITUS WITHOUT COMPLICATION, WITHOUT LONG-TERM CURRENT USE OF INSULIN (MULTI): ICD-10-CM

## 2024-05-22 DIAGNOSIS — E11.9 TYPE 2 DIABETES MELLITUS WITHOUT COMPLICATIONS (MULTI): ICD-10-CM

## 2024-05-22 ASSESSMENT — ENCOUNTER SYMPTOMS: DIABETIC ASSOCIATED SYMPTOMS: 0

## 2024-05-22 NOTE — PROGRESS NOTES
"Clinical Pharmacy Appointment    Subjective   Patient ID: Yane Lang is a 53 y.o. female who presents for Diabetes    Referring Provider: Rj Nj,      Diabetes  She presents for her follow-up diabetic visit. She has type 2 diabetes mellitus. The initial diagnosis of diabetes was made 8 years ago. Her disease course has been improving. (Shaky, nausea) There are no diabetic associated symptoms. There are no hypoglycemic complications. Diabetic complications include peripheral neuropathy. Risk factors for coronary artery disease include tobacco exposure, obesity, diabetes mellitus, hypertension and dyslipidemia. Current diabetic treatments: Glimepiride 4mg 2 times a day, Jardiance 10mg daily, Actos 30mg daily, and Mounjaro 2.5mg once weekly. She is compliant with treatment all of the time (Patient is currently compliant with treatment, however is now worried about paying for medications now that she has commercial insurance.). Diabetic current diet: Typically 2 meals per day with a snack. Eats a lot of \"quick meals.\" Microwavable dinners, pizza, pizza rolls, hot dogs, chicken breast, etc. Patient states she drinks zero sugar KoolAid and Iced Team. When asked about meal planning, she reported none. Exercise: Patient states she is a  at a high school, so she gets movement in through walking up and down the halls, mopping, sweeping, vaccuming, etc. (Patient states she ran out of Mash[employer]. She will now be testing her blood sugars with finger sticks once daily. Patient reports recent morning blood sugars between 118-162mg/dL.) An ACE inhibitor/angiotensin II receptor blocker is being taken. She does not see a podiatrist.Eye exam is current.     Objective     Labs  Lab Results   Component Value Date    BILITOT 0.6 04/05/2024    CALCIUM 9.4 04/05/2024    CO2 24 04/05/2024    CL 99 04/05/2024    CREATININE 0.73 04/05/2024    GLUCOSE 267 (H) 04/05/2024    ALKPHOS 103 04/05/2024    K 4.6 " 04/05/2024    PROT 7.3 04/05/2024     (L) 04/05/2024    AST 21 04/05/2024    ALT 27 04/05/2024    BUN 18 04/05/2024    ANIONGAP 17 04/05/2024    MG 1.82 10/04/2023    ALBUMIN 4.5 04/05/2024    LIPASE 12 03/16/2020    GFRF >90 04/22/2022     Lab Results   Component Value Date    TRIG 166 (H) 10/04/2023    CHOL 161 10/04/2023    LDLCALC 85 (L) 10/04/2023    HDL 43.2 10/04/2023     Lab Results   Component Value Date    HGBA1C 8.9 (H) 04/05/2024     Current Outpatient Medications on File Prior to Visit   Medication Sig Dispense Refill    acetaminophen (Tylenol 8 HOUR) 650 mg ER tablet Take 1 tablet by mouth every 8 hours if needed for mild pain (1 - 3). Do not crush, chew, or split. 60 tablet 0    atorvastatin (Lipitor) 10 mg tablet Take 1 tablet (10 mg) by mouth once daily. 90 tablet 0    Banophen 25 mg capsule TAKE 2 CAPSULES BY MOUTH EVERY 4 HOURS 180 capsule 1    blood sugar diagnostic (OneTouch Verio test strips) strip USE 1 STRIP 3 TIMES DAILY. 100 strip 2    cholecalciferol (Vitamin D-3) 50,000 unit capsule Take 1 capsule (50,000 Units) by mouth 1 (one) time per week. 12 capsule 0    dicyclomine (Bentyl) 10 mg capsule TAKE 1 CAPSULE BY MOUTH FOUR TIMES DAILY 120 capsule 1    FreeStyle Sandra sensor system (FreeStyle Sandra 14 Day Sensor) kit As directed (Patient taking differently: every 14 (fourteen) days. As directed) 6 each 4    furosemide (Lasix) 20 mg tablet Take 1 tablet (20 mg) by mouth once daily. (Patient taking differently: Take 1 tablet (20 mg) by mouth once daily as needed (swollen).) 90 tablet 1    gabapentin (Neurontin) 400 mg capsule Take 1 capsule (400 mg) by mouth 4 times a day. 360 capsule 0    glimepiride (Amaryl) 4 mg tablet Take 1 tablet (4 mg) by mouth 2 times a day. 180 tablet 0    ibuprofen 600 mg tablet TAKE 1 TABLET BY MOUTH THREE TIMES A DAY AS NEEDED FOR PAIN 90 tablet 0    lancets misc 3 times a day.      lidocaine (Lidoderm) 5 % patch APPLY ONE PATCH TO THE AFFECTED AREA(S)  DAILY. 12 HOURS on, 12 HOURS off. 90 patch 1    lisinopril 10 mg tablet Take 1 tablet (10 mg) by mouth once daily. for blood pressure 90 tablet 0    methocarbamol (Robaxin) 500 mg tablet TAKE 1 TO 2 TABLETS BY MOUTH NIGHTLY 60 tablet 0    nabumetone (Relafen) 500 mg tablet Take 1 tablet (500 mg) by mouth 2 times a day. 60 tablet 2    omeprazole (PriLOSEC) 20 mg DR capsule TAKE 1 CAPSULE BY MOUTH EVERY DAY DONT CRUSH OR CHEW 90 capsule 0    ondansetron ODT (Zofran-ODT) 4 mg disintegrating tablet Take 1 tablet (4 mg) by mouth every 6 hours if needed for nausea. DISSOLVE  ON THE TONGUE 25 tablet 5    pioglitazone (Actos) 45 mg tablet Take 1 tablet (45 mg) by mouth once daily. 90 tablet 0    traZODone (Desyrel) 50 mg tablet Take 1 tablet (50 mg) by mouth once daily at bedtime. 90 tablet 0    [DISCONTINUED] empagliflozin (Jardiance) 25 mg Take 1 tablet (25 mg) by mouth once daily. 90 tablet 0    [DISCONTINUED] semaglutide 0.25 mg or 0.5 mg (2 mg/3 mL) pen injector Inject 0.25 mg under the skin 1 (one) time per week. 5 mL 0    [DISCONTINUED] tirzepatide (Mounjaro) 5 mg/0.5 mL pen injector Inject 5 mg under the skin 1 (one) time per week. 2 mL 0     No current facility-administered medications on file prior to visit.      Assessment/Plan   Diabetes Education  Rule of 15: eating ~15 g of carbs when BG less than 80 (half cup juice, 3-4 glucose tabs).  Recognize symptoms of high and low blood sugar.   Eat a realistic healthy diet consisting of fruits, vegetables, fiber, protein food choices on a regular basis and be aware of portion/serving sizes. Reduce carbohydrate consumption and always consume with protein and fat. Avoid foods high in saturated/trans fat, high salt content, and sweets and beverages with added sugars.  Limit alcohol consumption; alcohol may affect your blood sugar and cause hypoglycemia.   Stay active and incorporate ~30 mins of exercise into your daily routine to manage your weight and increase the body's  acceptance of insulin.    PATIENT GOALS   Fasting B - 130 mg/dL 2-HR Postprandial BG:   Less than 180 mg/dL A1c:   Less than 7.0 %     Mounjaro Education:   Counseled patient on MOA, expectations, side effects, duration of therapy, contraindications, administration, and monitoring parameters  Answered all patient questions and concerns  Counseled patient on Mounjaro MOA, expectations, side effects, duration of therapy, administration, and monitoring parameters.  Provided detailed dosing and administration counseling to ensure proper technique.   Reviewed Mounjaro titration schedule, starting with 2.5 mg once weekly to a goal of 15 mg once weekly if tolerated  Counseled patient on the benefits of GLP-1ra glycemic control and weight loss  Reviewed storage requirements of Mounjaro when not in use, and when to administer the medication if a dose is missed.  Advised patient that they may experience improved satiety after meals and portion sizes of meals may be reduced as doses of Mounjaro increase.    Problem List Items Addressed This Visit             ICD-10-CM    Type 2 diabetes mellitus without complication, without long-term current use of insulin (Multi) E11.9     Patients diabetes is uncontrolled as evidenced by her most recent A1c of 8.9% (Goal <7%) on 2024. This was a decrease in A1c, congratulated patient.  RX CHANGES  CONTINUE Glimepiride 4mg twice a day, Pioglitazone 45mg daily, and Mounjaro 2.5mg weekly.  START Jardiance 25mg daily. Will send to [facility] for mail order.  Patient was able to get testing supplies through her insurance at her preferred pharmacy. Continue taking blood sugars 1-2 times daily.  Continue working towards healthy diet and lifestyle.         Relevant Medications    tirzepatide (Mounjaro) 2.5 mg/0.5 mL pen injector (Start on 2024)    Other Relevant Orders    Follow Up In [facility] - Pharmacy     Other Visit Diagnoses         Codes    Type 2  diabetes mellitus without complications (Multi)     E11.9    Relevant Medications    empagliflozin (Jardiance) 25 mg    Other Relevant Orders    Follow Up In [facility] - Pharmacy          SMOKING  Discussed smoking cessation with patient. Patient states she has started to cut back on how many cigarettes she smokes per day. Currently, patient states she has about 5 cigarettes per day. Patient does not smoke in the house, but will smoke on her break from work, on her way home, and if it is nice she will sit on her back patio at home and smoke. Patient states she has tried NRT in the past, but has not had success. States the patches gave her a rash and she did not like the gum or the lozenges. Discussed the harm from smoking, and discussed that the patient is spending a lot of money on cigarettes. Patient states she will quit on her own without NRT.    MEDICATION COST  Patient lost her Medicaid on 10/31/2023 and is worried about medication cost  Patient went to the pharmacy and was able to  all her medications at an affordable price.    Follow up with Clinical Pharmacy Team 7/17/2024 at 2:00PM.    Continue all meds under the continuation of care with the referring provider and clinical pharmacy team.    Please reach out to the Clinical Pharmacy Team if there are any further questions.     Verbal consent to manage patient's drug therapy was obtained from patient. They were informed they may decline to participate or withdraw from participation in pharmacy services at any time.    Brianna Joseph, PharmD  [phone]

## 2024-05-23 ENCOUNTER — PHARMACY VISIT (OUTPATIENT)
Dept: PHARMACY | Facility: CLINIC | Age: 54
End: 2024-05-23
Payer: MEDICARE

## 2024-05-23 PROCEDURE — RXMED WILLOW AMBULATORY MEDICATION CHARGE

## 2024-05-23 RX ORDER — TIRZEPATIDE 2.5 MG/.5ML
2.5 INJECTION, SOLUTION SUBCUTANEOUS
Qty: 2 ML | Refills: 2 | Status: SHIPPED | OUTPATIENT
Start: 2024-05-26

## 2024-05-23 NOTE — ASSESSMENT & PLAN NOTE
Patients diabetes is uncontrolled as evidenced by her most recent A1c of 8.9% (Goal <7%) on 4/5/2024. This was a decrease in A1c, congratulated patient.  RX CHANGES  CONTINUE Glimepiride 4mg twice a day, Pioglitazone 45mg daily, and Mounjaro 2.5mg weekly.  START Jardiance 25mg daily. Will send to [facility] for mail order.  Patient was able to get testing supplies through her insurance at her preferred pharmacy. Continue taking blood sugars 1-2 times daily.  Continue working towards healthy diet and lifestyle.

## 2024-06-13 PROCEDURE — RXMED WILLOW AMBULATORY MEDICATION CHARGE

## 2024-06-20 ENCOUNTER — PHARMACY VISIT (OUTPATIENT)
Dept: PHARMACY | Facility: CLINIC | Age: 54
End: 2024-06-20
Payer: MEDICARE

## 2024-06-30 PROCEDURE — RXMED WILLOW AMBULATORY MEDICATION CHARGE

## 2024-07-03 ENCOUNTER — PHARMACY VISIT (OUTPATIENT)
Dept: PHARMACY | Facility: CLINIC | Age: 54
End: 2024-07-03
Payer: MEDICARE

## 2024-07-05 DIAGNOSIS — Z12.39 ENCOUNTER FOR SCREENING FOR MALIGNANT NEOPLASM OF BREAST, UNSPECIFIED SCREENING MODALITY: ICD-10-CM

## 2024-07-11 PROCEDURE — RXMED WILLOW AMBULATORY MEDICATION CHARGE

## 2024-07-15 ENCOUNTER — PHARMACY VISIT (OUTPATIENT)
Dept: PHARMACY | Facility: CLINIC | Age: 54
End: 2024-07-15
Payer: MEDICARE

## 2024-07-17 ENCOUNTER — APPOINTMENT (OUTPATIENT)
Dept: PHARMACY | Facility: HOSPITAL | Age: 54
End: 2024-07-17
Payer: COMMERCIAL

## 2024-07-17 DIAGNOSIS — E11.9 TYPE 2 DIABETES MELLITUS WITHOUT COMPLICATION, WITHOUT LONG-TERM CURRENT USE OF INSULIN (MULTI): ICD-10-CM

## 2024-07-17 DIAGNOSIS — E11.9 TYPE 2 DIABETES MELLITUS WITHOUT COMPLICATIONS (MULTI): ICD-10-CM

## 2024-07-17 RX ORDER — GLIMEPIRIDE 4 MG/1
4 TABLET ORAL 2 TIMES DAILY
Qty: 180 TABLET | Refills: 0 | Status: SHIPPED | OUTPATIENT
Start: 2024-07-17

## 2024-07-17 ASSESSMENT — ENCOUNTER SYMPTOMS: DIABETIC ASSOCIATED SYMPTOMS: 0

## 2024-07-17 NOTE — PROGRESS NOTES
"Clinical Pharmacy Appointment    Subjective   Patient ID: Yane Lang is a 54 y.o. female who presents for Diabetes    Referring Provider: Rj Nj,      Diabetes  She presents for her follow-up diabetic visit. She has type 2 diabetes mellitus. The initial diagnosis of diabetes was made 8 years ago. Her disease course has been improving. (Shaky, nausea) There are no diabetic associated symptoms. There are no hypoglycemic complications. Diabetic complications include peripheral neuropathy. Risk factors for coronary artery disease include tobacco exposure, obesity, diabetes mellitus, hypertension and dyslipidemia. Current diabetic treatments: Glimepiride 4mg 2 times a day, Jardiance 25mg daily, Actos 45mg daily, and Mounjaro 2.5mg once weekly (Sunday) She is compliant with treatment all of the time. Diabetic current diet: Typically 2 meals per day with a snack. Eats a lot of \"quick meals.\" Microwavable dinners, pizza, pizza rolls, hot dogs, chicken breast, etc. Patient states she drinks zero sugar KoolAid and Iced Team. When asked about meal planning, she reported none. Exercise: Patient states she is a  at a high school, so she gets movement in through walking up and down the halls, mopping, sweeping, vaccuming, etc. (Patient states she ran out of [employer]. She will now be testing her blood sugars with finger sticks once daily. Patient reports recent morning blood sugars between 118-162mg/dL.) An ACE inhibitor/angiotensin II receptor blocker is being taken. She does not see a podiatrist.Eye exam is current.     Objective     Labs  Lab Results   Component Value Date    BILITOT 0.6 04/05/2024    CALCIUM 9.4 04/05/2024    CO2 24 04/05/2024    CL 99 04/05/2024    CREATININE 0.73 04/05/2024    GLUCOSE 267 (H) 04/05/2024    ALKPHOS 103 04/05/2024    K 4.6 04/05/2024    PROT 7.3 04/05/2024     (L) 04/05/2024    AST 21 04/05/2024    ALT 27 04/05/2024    BUN 18 04/05/2024    ANIONGAP " 17 04/05/2024    MG 1.82 10/04/2023    ALBUMIN 4.5 04/05/2024    LIPASE 12 03/16/2020    GFRF >90 04/22/2022     Lab Results   Component Value Date    TRIG 166 (H) 10/04/2023    CHOL 161 10/04/2023    LDLCALC 85 (L) 10/04/2023    HDL 43.2 10/04/2023     Lab Results   Component Value Date    HGBA1C 8.9 (H) 04/05/2024     Current Outpatient Medications on File Prior to Visit   Medication Sig Dispense Refill    acetaminophen (Tylenol 8 HOUR) 650 mg ER tablet Take 1 tablet by mouth every 8 hours if needed for mild pain (1 - 3). Do not crush, chew, or split. 60 tablet 0    atorvastatin (Lipitor) 10 mg tablet Take 1 tablet (10 mg) by mouth once daily. 90 tablet 0    Banophen 25 mg capsule TAKE 2 CAPSULES BY MOUTH EVERY 4 HOURS 180 capsule 1    blood sugar diagnostic (OneTouch Verio test strips) strip USE 1 STRIP 3 TIMES DAILY. 100 strip 2    cholecalciferol (Vitamin D-3) 50,000 unit capsule Take 1 capsule (50,000 Units) by mouth 1 (one) time per week. 12 capsule 0    dicyclomine (Bentyl) 10 mg capsule TAKE 1 CAPSULE BY MOUTH FOUR TIMES DAILY 120 capsule 1    empagliflozin (Jardiance) 25 mg Take 1 tablet (25 mg) by mouth once daily. 90 tablet 3    furosemide (Lasix) 20 mg tablet Take 1 tablet (20 mg) by mouth once daily. (Patient taking differently: Take 1 tablet (20 mg) by mouth once daily as needed (swollen).) 90 tablet 1    gabapentin (Neurontin) 400 mg capsule Take 1 capsule (400 mg) by mouth 4 times a day. 360 capsule 0    ibuprofen 600 mg tablet TAKE 1 TABLET BY MOUTH THREE TIMES A DAY AS NEEDED FOR PAIN 90 tablet 0    lancets misc 3 times a day.      lidocaine (Lidoderm) 5 % patch APPLY ONE PATCH TO THE AFFECTED AREA(S) DAILY. 12 HOURS on, 12 HOURS off. 90 patch 1    lisinopril 10 mg tablet Take 1 tablet (10 mg) by mouth once daily. for blood pressure 90 tablet 0    methocarbamol (Robaxin) 500 mg tablet TAKE 1 TO 2 TABLETS BY MOUTH NIGHTLY 60 tablet 0    nabumetone (Relafen) 500 mg tablet Take 1 tablet (500 mg) by  mouth 2 times a day. 60 tablet 2    omeprazole (PriLOSEC) 20 mg DR capsule TAKE 1 CAPSULE BY MOUTH EVERY DAY DONT CRUSH OR CHEW 90 capsule 0    ondansetron ODT (Zofran-ODT) 4 mg disintegrating tablet Take 1 tablet (4 mg) by mouth every 6 hours if needed for nausea. DISSOLVE  ON THE TONGUE 25 tablet 5    pioglitazone (Actos) 45 mg tablet Take 1 tablet (45 mg) by mouth once daily. 90 tablet 0    tirzepatide (Mounjaro) 2.5 mg/0.5 mL pen injector Inject 2.5 mg under the skin 1 (one) time per week. 2 mL 2    traZODone (Desyrel) 50 mg tablet Take 1 tablet (50 mg) by mouth once daily at bedtime. 90 tablet 0    [DISCONTINUED] FreeStyle Sandra sensor system (FreeStyle Sandra 14 Day Sensor) kit As directed (Patient taking differently: every 14 (fourteen) days. As directed) 6 each 4    [DISCONTINUED] glimepiride (Amaryl) 4 mg tablet Take 1 tablet (4 mg) by mouth 2 times a day. 180 tablet 0     No current facility-administered medications on file prior to visit.      Assessment/Plan   Diabetes Education  Rule of 15: eating ~15 g of carbs when BG less than 80 (half cup juice, 3-4 glucose tabs).  Recognize symptoms of high and low blood sugar.   Eat a realistic healthy diet consisting of fruits, vegetables, fiber, protein food choices on a regular basis and be aware of portion/serving sizes. Reduce carbohydrate consumption and always consume with protein and fat. Avoid foods high in saturated/trans fat, high salt content, and sweets and beverages with added sugars.  Limit alcohol consumption; alcohol may affect your blood sugar and cause hypoglycemia.   Stay active and incorporate ~30 mins of exercise into your daily routine to manage your weight and increase the body's acceptance of insulin.    PATIENT GOALS   Fasting B - 130 mg/dL 2-HR Postprandial BG:   Less than 180 mg/dL A1c:   Less than 7.0 %     Mounjaro Education:   Counseled patient on MOA, expectations, side effects, duration of therapy, contraindications,  administration, and monitoring parameters  Answered all patient questions and concerns  Counseled patient on Mounjaro MOA, expectations, side effects, duration of therapy, administration, and monitoring parameters.  Provided detailed dosing and administration counseling to ensure proper technique.   Reviewed Mounjaro titration schedule, starting with 2.5 mg once weekly to a goal of 15 mg once weekly if tolerated  Counseled patient on the benefits of GLP-1ra glycemic control and weight loss  Reviewed storage requirements of Mounjaro when not in use, and when to administer the medication if a dose is missed.  Advised patient that they may experience improved satiety after meals and portion sizes of meals may be reduced as doses of Mounjaro increase.    Problem List Items Addressed This Visit             ICD-10-CM    Type 2 diabetes mellitus without complications (Multi) E11.9     Patients diabetes is uncontrolled as evidenced by her most recent A1c of 8.9% (Goal <7%) on 4/5/2024. Patient is due for an updated A1c, will send in.  RX CHANGES  CONTINUE Glimepiride 4mg twice a day, Pioglitazone 45mg daily, Jardiance 25mg daily. Will send Glimepiride for refill.  INCREASE Mounjaro to 5mg once weekly after completing 3 more doses of Mounjaro 2.5mg weekly. Patient has increased dose at home.  Patient was able to get testing supplies through her insurance at her preferred pharmacy. Continue taking blood sugars 1-2 times daily.  Continue working towards healthy diet and lifestyle.    Mounjaro Education:   Counseled patient on MOA, expectations, side effects, duration of therapy, contraindications, administration, and monitoring parameters  Answered all patient questions and concerns  Provided detailed dosing and administration counseling to ensure proper technique.   Reviewed Mounjaro titration schedule, starting with 2.5 mg once weekly to a goal of 15 mg once weekly if tolerated  Counseled patient on the benefits of GLP-1ra  glycemic control and weight loss  Reviewed storage requirements of Mounjaro when not in use, and when to administer the medication if a dose is missed.  Advised patient that they may experience improved satiety after meals and portion sizes of meals may be reduced as doses of Mounjaro increase.    Jardiance Education:   Counseled patient on Jardiance MOA, expectations, side effects, duration of therapy, administration, and monitoring parameters.  Reviewed the benefits of SGLT-2i therapy, such as glycemic control and kidney and CV protection.  Advised patient to practice proper  hygiene to reduce risk of UTIs or yeast infections.  Advised patient to maintain adequate fluid intake to remain hydrated while on SGLT2i therapy.  Answered all patient questions and concerns.    Diabetes Education  Rule of 15: eating ~15 g of carbs when BG less than 80 (half cup juice, 3-4 glucose tabs).  Recognize symptoms of high and low blood sugar.   Eat a realistic healthy diet consisting of fruits, vegetables, fiber, protein food choices on a regular basis and be aware of portion/serving sizes. Reduce carbohydrate consumption and always consume with protein and fat. Avoid foods high in saturated/trans fat, high salt content, and sweets and beverages with added sugars.  Limit alcohol consumption; alcohol may affect your blood sugar and cause hypoglycemia.   Stay active and incorporate ~30 mins of exercise into your daily routine to manage your weight and increase the body's acceptance of insulin.    PATIENT GOALS   Fasting B - 130 mg/dL 2-HR Postprandial BG:   Less than 180 mg/dL A1c:   Less than 7.0 %            Relevant Medications    glimepiride (Amaryl) 4 mg tablet    Other Relevant Orders    Hemoglobin A1c    Follow Up In [facility] - Pharmacy       SMOKING  Discussed smoking cessation with patient. Patient states she has started to cut back on how many cigarettes she smokes per day. Currently, patient states  she has about 5 cigarettes per day. Patient does not smoke in the house, but will smoke on her break from work, on her way home, and if it is nice she will sit on her back patio at home and smoke. Patient states she has tried NRT in the past, but has not had success. States the patches gave her a rash and she did not like the gum or the lozenges. Discussed the harm from smoking, and discussed that the patient is spending a lot of money on cigarettes. Patient states she will quit on her own without NRT.    MEDICATION COST  Patient lost her Medicaid on 10/31/2023 and is worried about medication cost  Patient went to the pharmacy and was able to  all her medications at an affordable price.    Follow up with Clinical Pharmacy Team 8/21/2024 at 2:00PM.    Continue all meds under the continuation of care with the referring provider and clinical pharmacy team.    Please reach out to the Clinical Pharmacy Team if there are any further questions.     Verbal consent to manage patient's drug therapy was obtained from patient. They were informed they may decline to participate or withdraw from participation in pharmacy services at any time.    Brianna Joseph, PharmD  [phone]

## 2024-07-17 NOTE — ASSESSMENT & PLAN NOTE
Patients diabetes is uncontrolled as evidenced by her most recent A1c of 8.9% (Goal <7%) on 4/5/2024. Patient is due for an updated A1c, will send in.  RX CHANGES  CONTINUE Glimepiride 4mg twice a day, Pioglitazone 45mg daily, Jardiance 25mg daily. Will send Glimepiride for refill.  INCREASE Mounjaro to 5mg once weekly after completing 3 more doses of Mounjaro 2.5mg weekly. Patient has increased dose at home.  Patient was able to get testing supplies through her insurance at her preferred pharmacy. Continue taking blood sugars 1-2 times daily.  Continue working towards healthy diet and lifestyle.    Mounjaro Education:   Counseled patient on MOA, expectations, side effects, duration of therapy, contraindications, administration, and monitoring parameters  Answered all patient questions and concerns  Provided detailed dosing and administration counseling to ensure proper technique.   Reviewed Mounjaro titration schedule, starting with 2.5 mg once weekly to a goal of 15 mg once weekly if tolerated  Counseled patient on the benefits of GLP-1ra glycemic control and weight loss  Reviewed storage requirements of Mounjaro when not in use, and when to administer the medication if a dose is missed.  Advised patient that they may experience improved satiety after meals and portion sizes of meals may be reduced as doses of Mounjaro increase.    Jardiance Education:   Counseled patient on Jardiance MOA, expectations, side effects, duration of therapy, administration, and monitoring parameters.  Reviewed the benefits of SGLT-2i therapy, such as glycemic control and kidney and CV protection.  Advised patient to practice proper  hygiene to reduce risk of UTIs or yeast infections.  Advised patient to maintain adequate fluid intake to remain hydrated while on SGLT2i therapy.  Answered all patient questions and concerns.    Diabetes Education  Rule of 15: eating ~15 g of carbs when BG less than 80 (half cup juice, 3-4 glucose  tabs).  Recognize symptoms of high and low blood sugar.   Eat a realistic healthy diet consisting of fruits, vegetables, fiber, protein food choices on a regular basis and be aware of portion/serving sizes. Reduce carbohydrate consumption and always consume with protein and fat. Avoid foods high in saturated/trans fat, high salt content, and sweets and beverages with added sugars.  Limit alcohol consumption; alcohol may affect your blood sugar and cause hypoglycemia.   Stay active and incorporate ~30 mins of exercise into your daily routine to manage your weight and increase the body's acceptance of insulin.    PATIENT GOALS   Fasting B - 130 mg/dL 2-HR Postprandial BG:   Less than 180 mg/dL A1c:   Less than 7.0 %

## 2024-07-26 PROCEDURE — RXMED WILLOW AMBULATORY MEDICATION CHARGE

## 2024-07-29 ENCOUNTER — PHARMACY VISIT (OUTPATIENT)
Dept: PHARMACY | Facility: CLINIC | Age: 54
End: 2024-07-29
Payer: MEDICARE

## 2024-08-08 DIAGNOSIS — E11.9 TYPE 2 DIABETES MELLITUS WITHOUT COMPLICATION, WITHOUT LONG-TERM CURRENT USE OF INSULIN (MULTI): ICD-10-CM

## 2024-08-13 DIAGNOSIS — G60.9 IDIOPATHIC PERIPHERAL NEUROPATHY: ICD-10-CM

## 2024-08-13 DIAGNOSIS — R52 PAIN: ICD-10-CM

## 2024-08-13 DIAGNOSIS — E11.9 TYPE 2 DIABETES MELLITUS WITHOUT COMPLICATIONS (MULTI): ICD-10-CM

## 2024-08-13 RX ORDER — GLIMEPIRIDE 4 MG/1
4 TABLET ORAL 2 TIMES DAILY
Qty: 180 TABLET | Refills: 3 | Status: SHIPPED | OUTPATIENT
Start: 2024-08-13

## 2024-08-13 RX ORDER — GABAPENTIN 400 MG/1
400 CAPSULE ORAL 4 TIMES DAILY
Qty: 360 CAPSULE | Refills: 0 | Status: SHIPPED | OUTPATIENT
Start: 2024-08-13 | End: 2024-08-13 | Stop reason: SDUPTHER

## 2024-08-13 RX ORDER — GABAPENTIN 400 MG/1
400 CAPSULE ORAL 4 TIMES DAILY
Qty: 360 CAPSULE | Refills: 0 | Status: SHIPPED | OUTPATIENT
Start: 2024-08-13

## 2024-08-16 RX ORDER — TIRZEPATIDE 2.5 MG/.5ML
2.5 INJECTION, SOLUTION SUBCUTANEOUS
Qty: 2 ML | Refills: 2 | OUTPATIENT
Start: 2024-08-18

## 2024-08-19 ENCOUNTER — TELEMEDICINE (OUTPATIENT)
Dept: PRIMARY CARE | Facility: CLINIC | Age: 54
End: 2024-08-19
Payer: COMMERCIAL

## 2024-08-19 DIAGNOSIS — H10.33 ACUTE BACTERIAL CONJUNCTIVITIS OF BOTH EYES: Primary | ICD-10-CM

## 2024-08-19 DIAGNOSIS — E66.01 CLASS 3 SEVERE OBESITY WITH BODY MASS INDEX (BMI) OF 50.0 TO 59.9 IN ADULT, UNSPECIFIED OBESITY TYPE, UNSPECIFIED WHETHER SERIOUS COMORBIDITY PRESENT (MULTI): ICD-10-CM

## 2024-08-19 DIAGNOSIS — H57.89 REDNESS OF BOTH EYES: ICD-10-CM

## 2024-08-19 PROCEDURE — 99213 OFFICE O/P EST LOW 20 MIN: CPT | Performed by: NURSE PRACTITIONER

## 2024-08-19 RX ORDER — POLYMYXIN B SULFATE AND TRIMETHOPRIM 1; 10000 MG/ML; [USP'U]/ML
1 SOLUTION OPHTHALMIC
Qty: 10 ML | Refills: 0 | Status: SHIPPED | OUTPATIENT
Start: 2024-08-19 | End: 2024-08-26

## 2024-08-19 ASSESSMENT — ENCOUNTER SYMPTOMS
COUGH: 0
HEADACHES: 0
FEVER: 0
DEPRESSION: 0
WEAKNESS: 0
EYE ITCHING: 1
DIZZINESS: 0
EYE REDNESS: 1
OCCASIONAL FEELINGS OF UNSTEADINESS: 0
PHOTOPHOBIA: 0
SINUS PAIN: 0
SORE THROAT: 0
SHORTNESS OF BREATH: 0
LOSS OF SENSATION IN FEET: 0
EYE DISCHARGE: 1
EYE PAIN: 0
WHEEZING: 0
CHILLS: 0
PALPITATIONS: 0

## 2024-08-19 ASSESSMENT — PATIENT HEALTH QUESTIONNAIRE - PHQ9
SUM OF ALL RESPONSES TO PHQ9 QUESTIONS 1 AND 2: 0
2. FEELING DOWN, DEPRESSED OR HOPELESS: NOT AT ALL
1. LITTLE INTEREST OR PLEASURE IN DOING THINGS: NOT AT ALL

## 2024-08-19 NOTE — PROGRESS NOTES
Subjective   Patient ID: Yane Lang is a 54 y.o. female who presents for       Symptoms: pink eye, draining, itchy, discharge, redness, eyes sealed shut when waking up.  Length of symptoms: yesterday  OTC: eyedrops visine allergy, with no help.    HPI     Virtual or Telephone Consent    An interactive audio and video telecommunication system which permits real time communications between the patient (at the originating site) and provider (at the distant site) was utilized to provide this telehealth service.   Verbal consent was requested and obtained from Yane Lang on this date, 08/19/24 for a telehealth visit.      54-year-old female presents today complaining of possible pinkeye in both eyes.  Yesterday she states that her eyes felt itchy and more red.  Throughout the day she noticed thicker drainage coming from both of her eyes.  This morning both of her eyes were crusted shut.  She does have some blurriness from the discharge, but states that when she removes the discharge from her eyes, her vision is normal.  She denies any pain in the eye.  No other cold symptoms currently.    Review of Systems   Constitutional:  Negative for chills and fever.   HENT:  Negative for congestion, ear pain, sinus pain and sore throat.    Eyes:  Positive for discharge, redness and itching. Negative for photophobia, pain and visual disturbance.   Respiratory:  Negative for cough, shortness of breath and wheezing.    Cardiovascular:  Negative for chest pain and palpitations.   Neurological:  Negative for dizziness, weakness and headaches.       Objective   There were no vitals taken for this visit.  No vitals taken with virtual visit    Physical Exam general: Alert and oriented x 3, in no acute distress  Head/Neck: Normal Appearance  Eyes: Bilateral conjunctiva appear erythematous.  Yellow drainage noted in eye corners  Lungs: Does not appear short of breath, able to speak in complete sentences  Psych: Appropriate mood and  affect    Assessment/Plan   Problem List Items Addressed This Visit    None  Visit Diagnoses         Codes    Acute bacterial conjunctivitis of both eyes    -  Primary H10.33    Relevant Medications    polymyxin B sulf-trimethoprim (Polytrim) ophthalmic solution    Redness of both eyes     H57.89    Class 3 severe obesity with body mass index (BMI) of 50.0 to 59.9 in adult, unspecified obesity type, unspecified whether serious comorbidity present (Multi)     E66.01, Z68.43        Conjunctivitis: Will treat with Polytrim.  Educated to make sure to wash hands frequently and thoroughly.  Wash all sheets, towels, clothes in hot soapy water.  Follow-up with PCP in 1 week with any persisting symptoms, or sooner with any additional concerns.  If developing any eye pain, visual changes/blurred vision, proceed to ER

## 2024-08-20 NOTE — PROGRESS NOTES
Clinical Pharmacy Appointment    Patient ID: Yane Lang is a 54 y.o. female who presents for Diabetes.    Pt is here for Follow Up appointment.     Referring Provider: Rj Nj DO  PCP: Rj Nj DO   Last visit with PCP: 4/22/2024   Next visit with PCP: none scheduled, recommended      Subjective      Patient Assistance for Mounjaro and Jardiance approved through 11/03/2024. Will have to be renewed prior to that date to prevent lapse in coverage. Medication(s) will be received at no cost to patient from [facility].     HPI    Lab Results   Component Value Date    HGBA1C 8.9 (H) 04/05/2024       PMH: obesity, HTN, HLD    BP Readings from Last 3 Encounters:   04/22/24 118/80   07/13/23 118/62   06/12/23 126/72        DIABETES MELLITUS TYPE 2:    Diagnosed with diabetes:  8 years. Known diabetic complications: peripheral neuropathy.  Does patient follow with Endocrinology: no  Last optometry exam: none  Most recent visit in Podiatry: 2021-- patient denies sores or cuts on feet today      Current diabetic medications include:  Glimepiride 4mg -2 times a day  Jardiance 25mg daily  Actos 45mg daily  Mounjaro 5mg once weekly (Sunday)     Clarifications to above regimen: has had 2 doses of Mounjaro 5mg  Adverse Effects: none reported    Glucose Readings:  Glucometer/CGM Type: OneTouch    Current home BG readings: 128 am reading    Any episodes of hypoglycemia? No, none reported .  Did patient treat episode of hypoglycemia appropriately? N/A  Does the patient have a prescription for ready-to-use Glucagon? N/a  Does pt have proteinuria? N/A, UACR of 54 5/2021    Secondary Prevention:  Statin? Yes- Lipitor 10mg - take 1 tab every day  ACE-I/ARB? Yes- Lisinopril 10mg - take 1 tab every day  Aspirin? No    Pertinent PMH Review:  PMH of Pancreatitis: No  PMH of Retinopathy: No  PMH of Urinary Tract Infections: No  PMH of MTC: No      Drug Interactions  No relevant drug interactions were  noted.    Medication System Management  [facility]'s [facility] [place]  Adherence/Organization: was getting medications through mail order, but states she has an outstanding balance there and cannot get any meds sent out to her; prefers [facility] in [place] now  Affordability/Accessibility:  Patient Assistance      Objective   Allergies   Allergen Reactions    Cephalexin Itching     GI UPSET    Iodinated Contrast Media Unknown    Meperidine Unknown    Morphine Itching    Other Swelling    Oxycodone Hcl-Oxycodone-Asa Unknown    Oxycodone-Acetaminophen Unknown    Propoxyphene N-Acetaminophen Unknown     Social History     Social History Narrative    Not on file      Medication Review  Current Outpatient Medications   Medication Instructions    acetaminophen (Tylenol 8 HOUR) 650 mg ER tablet Take 1 tablet by mouth every 8 hours if needed for mild pain (1 - 3). Do not crush, chew, or split.    atorvastatin (LIPITOR) 10 mg, oral, Daily    Banophen 25 mg capsule TAKE 2 CAPSULES BY MOUTH EVERY 4 HOURS    blood sugar diagnostic (OneTouch Verio test strips) strip USE 1 STRIP 3 TIMES DAILY.    cholecalciferol (VITAMIN D-3) 50,000 Units, oral, Once Weekly    ciprofloxacin (Ciloxan) 0.3 % ophthalmic solution 1 drop, Both Eyes, 4 times daily    dicyclomine (Bentyl) 10 mg capsule TAKE 1 CAPSULE BY MOUTH FOUR TIMES DAILY    empagliflozin (JARDIANCE) 25 mg, oral, Daily    furosemide (LASIX) 20 mg, oral, Daily    gabapentin (NEURONTIN) 400 mg, oral, 4 times daily    glimepiride (AMARYL) 4 mg, oral, 2 times daily    ibuprofen 600 mg tablet TAKE 1 TABLET BY MOUTH THREE TIMES A DAY AS NEEDED FOR PAIN    lancets misc miscellaneous, 3 times daily    lidocaine (Lidoderm) 5 % patch APPLY ONE PATCH TO THE AFFECTED AREA(S) DAILY. 12 HOURS on, 12 HOURS off.    lisinopril 10 mg, oral, Daily, for blood pressure    methocarbamol (Robaxin) 500 mg tablet TAKE 1 TO 2 TABLETS BY MOUTH NIGHTLY    Mounjaro 5 mg, subcutaneous, Every 7 days     nabumetone (RELAFEN) 500 mg, oral, 2 times daily    omeprazole (PriLOSEC) 20 mg DR capsule TAKE 1 CAPSULE BY MOUTH EVERY DAY DONT CRUSH OR CHEW    ondansetron ODT (ZOFRAN-ODT) 4 mg, oral, Every 6 hours PRN, DISSOLVE  ON THE TONGUE    pioglitazone (ACTOS) 45 mg, oral, Daily    polymyxin B sulf-trimethoprim (Polytrim) ophthalmic solution 1 drop, Both Eyes, 6 times daily    traZODone (DESYREL) 50 mg, oral, Nightly      Vitals  BP Readings from Last 2 Encounters:   04/22/24 118/80   07/13/23 118/62     BMI Readings from Last 1 Encounters:   04/22/24 51.55 kg/m²      Labs  A1C  Lab Results   Component Value Date    HGBA1C 8.9 (H) 04/05/2024    HGBA1C 9.6 (A) 07/13/2023    HGBA1C 11.1 (A) 06/12/2023     BMP  Lab Results   Component Value Date    CALCIUM 9.4 04/05/2024     (L) 04/05/2024    K 4.6 04/05/2024    CO2 24 04/05/2024    CL 99 04/05/2024    BUN 18 04/05/2024    CREATININE 0.73 04/05/2024    EGFR >90 04/05/2024     LFTs  Lab Results   Component Value Date    ALT 27 04/05/2024    AST 21 04/05/2024    ALKPHOS 103 04/05/2024    BILITOT 0.6 04/05/2024     FLP  Lab Results   Component Value Date    TRIG 166 (H) 10/04/2023    CHOL 161 10/04/2023    LDLF 87 04/22/2022    LDLCALC 85 (L) 10/04/2023    HDL 43.2 10/04/2023     Urine Microalbumin  Lab Results   Component Value Date    MICROALBCREA 54.7 (H) 05/11/2021     Weight Management  Wt Readings from Last 3 Encounters:   04/22/24 132 kg (291 lb)   10/05/23 128 kg (282 lb)   07/13/23 128 kg (282 lb)      There is no height or weight on file to calculate BMI.     Assessment/Plan   Problem List Items Addressed This Visit       Type 2 diabetes mellitus without complications (Multi)     Patient's goal A1c is < 7%.  Is pt at goal? No, 8.9% 4/2024  Patient's SMBGs are 120-140mg/dL, but limited  Rationale for plan: Last time patient increased Mounjaro to 7.5mg weekly, she had GI side effects. Would like to stay on 5mg for a few months. Patient would benefit from an  updated A1c, but states she cannot afford the lab order at this time.    Medication Changes:  CONTINUE:  Glimepiride 4mg 2 times a day  Jardiance 25mg daily  Actos 45mg daily  Mounjaro 5mg once weekly ()     Future Considerations:  Recommend lifestyle changes    Diabetes Education  Rule of 15: eating ~15 g of carbs when BG less than 80 (half cup juice, 3-4 glucose tabs).  Recognize symptoms of high and low blood sugar.   Eat a realistic healthy diet consisting of fruits, vegetables, fiber, protein food choices on a regular basis and be aware of portion/serving sizes. Reduce carbohydrate consumption and always consume with protein and fat. Avoid foods high in saturated/trans fat, high salt content, and sweets and beverages with added sugars.  Limit alcohol consumption; alcohol may affect your blood sugar and cause hypoglycemia.   Stay active and incorporate ~30 mins of exercise into your daily routine to manage your weight and increase the body's acceptance of insulin.    PATIENT GOALS   Fasting B - 130 mg/dL 2-HR Postprandial BG:   Less than 180 mg/dL A1c:   Less than 7.0 %      Mounjaro Education:   Counseled patient on MOA, expectations, side effects, duration of therapy, contraindications, administration, and monitoring parameters  Answered all patient questions and concerns  Provided detailed dosing and administration counseling to ensure proper technique.   Reviewed Mounjaro titration schedule, starting with 2.5 mg once weekly to a goal of 15 mg once weekly if tolerated  Counseled patient on the benefits of GLP-1ra glycemic control and weight loss  Reviewed storage requirements of Mounjaro when not in use, and when to administer the medication if a dose is missed.  Advised patient that they may experience improved satiety after meals and portion sizes of meals may be reduced as doses of Mounjaro increase.         Relevant Medications    empagliflozin (Jardiance) 25 mg    tirzepatide (Mounjaro) 5  mg/0.5 mL pen injector    Other Relevant Orders    Follow Up In Advanced Primary Care - Pharmacy       Clinical Pharmacist follow-up: 9/18/2024 @ 2PM, Telehealth visit    Continue all meds under the continuation of care with the referring provider and clinical pharmacy team.    Thank you,  Radha Zuniga  Clinical Pharmacist  [phone]    Verbal consent to manage patient's drug therapy was obtained from the patient. They were informed they may decline to participate or withdraw from participation in pharmacy services at any time.

## 2024-08-21 ENCOUNTER — APPOINTMENT (OUTPATIENT)
Dept: PHARMACY | Facility: HOSPITAL | Age: 54
End: 2024-08-21
Payer: COMMERCIAL

## 2024-08-21 DIAGNOSIS — E11.9 TYPE 2 DIABETES MELLITUS WITHOUT COMPLICATION, WITHOUT LONG-TERM CURRENT USE OF INSULIN (MULTI): ICD-10-CM

## 2024-08-21 DIAGNOSIS — E11.9 TYPE 2 DIABETES MELLITUS WITHOUT COMPLICATIONS (MULTI): ICD-10-CM

## 2024-08-21 NOTE — ASSESSMENT & PLAN NOTE
Patient's goal A1c is < 7%.  Is pt at goal? No, 8.9% 2024  Patient's SMBGs are 120-140mg/dL, but limited  Rationale for plan: Last time patient increased Mounjaro to 7.5mg weekly, she had GI side effects. Would like to stay on 5mg for a few months. Patient would benefit from an updated A1c, but states she cannot afford the lab order at this time.    Medication Changes:  CONTINUE:  Glimepiride 4mg 2 times a day  Jardiance 25mg daily  Actos 45mg daily  Mounjaro 5mg once weekly ()     Future Considerations:  Recommend lifestyle changes    Diabetes Education  Rule of 15: eating ~15 g of carbs when BG less than 80 (half cup juice, 3-4 glucose tabs).  Recognize symptoms of high and low blood sugar.   Eat a realistic healthy diet consisting of fruits, vegetables, fiber, protein food choices on a regular basis and be aware of portion/serving sizes. Reduce carbohydrate consumption and always consume with protein and fat. Avoid foods high in saturated/trans fat, high salt content, and sweets and beverages with added sugars.  Limit alcohol consumption; alcohol may affect your blood sugar and cause hypoglycemia.   Stay active and incorporate ~30 mins of exercise into your daily routine to manage your weight and increase the body's acceptance of insulin.    PATIENT GOALS   Fasting B - 130 mg/dL 2-HR Postprandial BG:   Less than 180 mg/dL A1c:   Less than 7.0 %      Mounjaro Education:   Counseled patient on MOA, expectations, side effects, duration of therapy, contraindications, administration, and monitoring parameters  Answered all patient questions and concerns  Provided detailed dosing and administration counseling to ensure proper technique.   Reviewed Mounjaro titration schedule, starting with 2.5 mg once weekly to a goal of 15 mg once weekly if tolerated  Counseled patient on the benefits of GLP-1ra glycemic control and weight loss  Reviewed storage requirements of Mounjaro when not in use, and when to  administer the medication if a dose is missed.  Advised patient that they may experience improved satiety after meals and portion sizes of meals may be reduced as doses of Mounjaro increase.

## 2024-08-22 ENCOUNTER — TELEPHONE (OUTPATIENT)
Dept: PRIMARY CARE | Facility: CLINIC | Age: 54
End: 2024-08-22
Payer: COMMERCIAL

## 2024-08-22 DIAGNOSIS — H10.33 ACUTE BACTERIAL CONJUNCTIVITIS OF BOTH EYES: Primary | ICD-10-CM

## 2024-08-22 RX ORDER — CIPROFLOXACIN HYDROCHLORIDE 3 MG/ML
1 SOLUTION/ DROPS OPHTHALMIC 4 TIMES DAILY
Qty: 5 ML | Refills: 0 | Status: SHIPPED | OUTPATIENT
Start: 2024-08-22 | End: 2024-08-23 | Stop reason: SDUPTHER

## 2024-08-22 NOTE — TELEPHONE ENCOUNTER
PT KALYAN RO     PT WAS SEEN BY [facility]DO FOR [facility], THE MEDICATION THE PATIENT HAS ISN'T WORKING AND WOULD LIKE ANOTHER MEDICATION SENT OVER.     PT IS ALSO REQUESTING A PHONE CALL FROM EDMUNDO   [phone]

## 2024-08-22 NOTE — TELEPHONE ENCOUNTER
I sent in different antibiotic drops for her. If her symptoms do not improve, she will need an in-person follow up appointment please.

## 2024-08-23 ENCOUNTER — TELEPHONE (OUTPATIENT)
Dept: PRIMARY CARE | Facility: CLINIC | Age: 54
End: 2024-08-23
Payer: COMMERCIAL

## 2024-08-23 DIAGNOSIS — H10.33 ACUTE BACTERIAL CONJUNCTIVITIS OF BOTH EYES: ICD-10-CM

## 2024-08-23 PROCEDURE — RXMED WILLOW AMBULATORY MEDICATION CHARGE

## 2024-08-23 RX ORDER — TIRZEPATIDE 5 MG/.5ML
5 INJECTION, SOLUTION SUBCUTANEOUS
Qty: 2 ML | Refills: 0 | Status: SHIPPED | OUTPATIENT
Start: 2024-08-23

## 2024-08-23 RX ORDER — CIPROFLOXACIN HYDROCHLORIDE 3 MG/ML
1 SOLUTION/ DROPS OPHTHALMIC 4 TIMES DAILY
Qty: 5 ML | Refills: 0 | Status: SHIPPED | OUTPATIENT
Start: 2024-08-23 | End: 2024-08-28

## 2024-08-23 NOTE — TELEPHONE ENCOUNTER
Patient seen Alma Mixon.    Patient say eye drops were sent to incorrect pharmacy. Please sends script to [facility] in [place] on [address].    Also, patient is asking if she can have a doctors excuse for 8/21/24-8/22/24.    Please advise, thank you.

## 2024-08-24 DIAGNOSIS — R60.0 EDEMA OF EXTREMITIES: ICD-10-CM

## 2024-08-26 RX ORDER — NABUMETONE 500 MG/1
500 TABLET, FILM COATED ORAL 2 TIMES DAILY
Qty: 60 TABLET | Refills: 1 | Status: SHIPPED | OUTPATIENT
Start: 2024-08-26

## 2024-08-27 ENCOUNTER — PHARMACY VISIT (OUTPATIENT)
Dept: PHARMACY | Facility: CLINIC | Age: 54
End: 2024-08-27
Payer: MEDICARE

## 2024-09-17 DIAGNOSIS — E11.9 TYPE 2 DIABETES MELLITUS WITHOUT COMPLICATION, WITHOUT LONG-TERM CURRENT USE OF INSULIN (MULTI): ICD-10-CM

## 2024-09-17 RX ORDER — TIRZEPATIDE 5 MG/.5ML
5 INJECTION, SOLUTION SUBCUTANEOUS
Qty: 2 ML | Refills: 0 | OUTPATIENT
Start: 2024-09-17

## 2024-09-18 ENCOUNTER — APPOINTMENT (OUTPATIENT)
Dept: PHARMACY | Facility: HOSPITAL | Age: 54
End: 2024-09-18
Payer: COMMERCIAL

## 2024-09-18 DIAGNOSIS — E78.5 HYPERLIPIDEMIA, UNSPECIFIED: ICD-10-CM

## 2024-09-18 DIAGNOSIS — I10 ESSENTIAL (PRIMARY) HYPERTENSION: ICD-10-CM

## 2024-09-18 DIAGNOSIS — E11.9 TYPE 2 DIABETES MELLITUS WITHOUT COMPLICATION, WITHOUT LONG-TERM CURRENT USE OF INSULIN (MULTI): ICD-10-CM

## 2024-09-18 DIAGNOSIS — E11.9 TYPE 2 DIABETES MELLITUS WITHOUT COMPLICATIONS (MULTI): ICD-10-CM

## 2024-09-18 PROCEDURE — RXMED WILLOW AMBULATORY MEDICATION CHARGE

## 2024-09-18 RX ORDER — ATORVASTATIN CALCIUM 10 MG/1
10 TABLET, FILM COATED ORAL DAILY
Qty: 90 TABLET | Refills: 0 | Status: SHIPPED | OUTPATIENT
Start: 2024-09-18

## 2024-09-18 RX ORDER — PIOGLITAZONEHYDROCHLORIDE 45 MG/1
45 TABLET ORAL DAILY
Qty: 90 TABLET | Refills: 0 | Status: SHIPPED | OUTPATIENT
Start: 2024-09-18

## 2024-09-18 RX ORDER — LISINOPRIL 10 MG/1
10 TABLET ORAL DAILY
Qty: 90 TABLET | Refills: 0 | Status: SHIPPED | OUTPATIENT
Start: 2024-09-18

## 2024-09-18 RX ORDER — TIRZEPATIDE 5 MG/.5ML
5 INJECTION, SOLUTION SUBCUTANEOUS
Qty: 2 ML | Refills: 0 | Status: SHIPPED | OUTPATIENT
Start: 2024-09-18

## 2024-09-18 NOTE — ASSESSMENT & PLAN NOTE
Patient needs refill on Atorvastatin. Will send in to [facility] for pickup.

## 2024-09-18 NOTE — PROGRESS NOTES
Clinical Pharmacy Appointment    Patient ID: Yane Lang is a 54 y.o. female who presents for Diabetes.    Pt is here for Follow Up appointment.     Referring Provider: Rj Nj DO  PCP: Rj Nj DO   Last visit with PCP: 4/22/2024   Next visit with PCP: none scheduled, recommended     Patient Assistance for Mounjaro and Jardiance approved through 11/03/2024. Will have to be renewed prior to that date to prevent lapse in coverage. Medication(s) will be received at no cost to patient from [facility].       Subjective     HPI    DIABETES MELLITUS TYPE 2:    Diagnosed with diabetes:  8 years. Known diabetic complications: peripheral neuropathy.  Does patient follow with Endocrinology: no  Last optometry exam: none  Most recent visit in Podiatry: 2021-- patient denies sores or cuts on feet today      Current diabetic medications include:  Glimepiride 4mg twice a day  Jardiance 25mg daily  Actos 45mg daily  Mounjaro 5mg once weekly (Sunday)     Adverse Effects: none reported    Glucose Readings:  Glucometer/CGM Type: OneTouch    Current home BG readings: 128 am reading    Any episodes of hypoglycemia? No, none reported .  Did patient treat episode of hypoglycemia appropriately? N/A  Does the patient have a prescription for ready-to-use Glucagon? N/a  Does pt have proteinuria? N/A, UACR of 54 5/2021    Secondary Prevention:  Statin? Yes- Lipitor 10mg - take 1 tab every day  ACE-I/ARB? Yes- Lisinopril 10mg - take 1 tab every day  Aspirin? No    Pertinent PMH Review:  PMH of Pancreatitis: No  PMH of Retinopathy: No  PMH of Urinary Tract Infections: No  PMH of MTC: No    Drug Interactions  No relevant drug interactions were noted.    Medication System Management  [facility] [place]  Adherence/Organization: was getting medications through mail order, but states she has an outstanding balance there and cannot get any meds sent out to her; prefers [facility] in [place]  now  Affordability/Accessibility:  Patient Assistance      Objective   Allergies   Allergen Reactions    Cephalexin Itching     GI UPSET    Iodinated Contrast Media Unknown    Meperidine Unknown    Morphine Itching    Other Swelling    Oxycodone Hcl-Oxycodone-Asa Unknown    Oxycodone-Acetaminophen Unknown    Polymyxin B Other    Propoxyphene N-Acetaminophen Unknown     Social History     Social History Narrative    Not on file      Medication Review  Current Outpatient Medications   Medication Instructions    acetaminophen (Tylenol 8 HOUR) 650 mg ER tablet Take 1 tablet by mouth every 8 hours if needed for mild pain (1 - 3). Do not crush, chew, or split.    atorvastatin (LIPITOR) 10 mg, oral, Daily    Banophen 25 mg capsule TAKE 2 CAPSULES BY MOUTH EVERY 4 HOURS    blood sugar diagnostic (OneTouch Verio test strips) strip USE 1 STRIP 3 TIMES DAILY.    cholecalciferol (VITAMIN D-3) 50,000 Units, oral, Once Weekly    dicyclomine (Bentyl) 10 mg capsule TAKE 1 CAPSULE BY MOUTH FOUR TIMES DAILY    empagliflozin (JARDIANCE) 25 mg, oral, Daily    furosemide (LASIX) 20 mg, oral, Daily    gabapentin (NEURONTIN) 400 mg, oral, 4 times daily    glimepiride (AMARYL) 4 mg, oral, 2 times daily    ibuprofen 600 mg tablet TAKE 1 TABLET BY MOUTH THREE TIMES A DAY AS NEEDED FOR PAIN    lancets misc miscellaneous, 3 times daily    lidocaine (Lidoderm) 5 % patch APPLY ONE PATCH TO THE AFFECTED AREA(S) DAILY. 12 HOURS on, 12 HOURS off.    lisinopril 10 mg, oral, Daily, for blood pressure    methocarbamol (Robaxin) 500 mg tablet TAKE 1 TO 2 TABLETS BY MOUTH NIGHTLY    Mounjaro 5 mg, subcutaneous, Every 7 days    nabumetone (RELAFEN) 500 mg, oral, 2 times daily    omeprazole (PriLOSEC) 20 mg DR capsule TAKE 1 CAPSULE BY MOUTH EVERY DAY DONT CRUSH OR CHEW    ondansetron ODT (ZOFRAN-ODT) 4 mg, oral, Every 6 hours PRN, DISSOLVE  ON THE TONGUE    pioglitazone (ACTOS) 45 mg, oral, Daily    traZODone (DESYREL) 50 mg, oral, Nightly       Vitals  BP Readings from Last 2 Encounters:   04/22/24 118/80   07/13/23 118/62     BMI Readings from Last 1 Encounters:   04/22/24 51.55 kg/m²      Labs  A1C  Lab Results   Component Value Date    HGBA1C 8.9 (H) 04/05/2024    HGBA1C 9.6 (A) 07/13/2023    HGBA1C 11.1 (A) 06/12/2023     BMP  Lab Results   Component Value Date    CALCIUM 9.4 04/05/2024     (L) 04/05/2024    K 4.6 04/05/2024    CO2 24 04/05/2024    CL 99 04/05/2024    BUN 18 04/05/2024    CREATININE 0.73 04/05/2024    EGFR >90 04/05/2024     LFTs  Lab Results   Component Value Date    ALT 27 04/05/2024    AST 21 04/05/2024    ALKPHOS 103 04/05/2024    BILITOT 0.6 04/05/2024     FLP  Lab Results   Component Value Date    TRIG 166 (H) 10/04/2023    CHOL 161 10/04/2023    LDLF 87 04/22/2022    LDLCALC 85 (L) 10/04/2023    HDL 43.2 10/04/2023     Urine Microalbumin  Lab Results   Component Value Date    MICROALBCREA 54.7 (H) 05/11/2021     Weight Management  Wt Readings from Last 3 Encounters:   04/22/24 132 kg (291 lb)   10/05/23 128 kg (282 lb)   07/13/23 128 kg (282 lb)      There is no height or weight on file to calculate BMI.     Assessment/Plan   Problem List Items Addressed This Visit       Hyperlipidemia, unspecified     Patient needs refill on Atorvastatin. Will send in to [facility] for pickup.         Relevant Medications    atorvastatin (Lipitor) 10 mg tablet    Essential (primary) hypertension     Patient needs refill on Lisinopril. Will send in to [facility] for pickup.         Relevant Medications    lisinopril 10 mg tablet    Type 2 diabetes mellitus without complications (Multi)     Patient's goal A1c is < 7%.  Is pt at goal? No, 8.9% 4/2024  Patient's SMBGs are 80-140mg/dL, but limited  Rationale for plan: Last time patient increased Mounjaro to 7.5mg weekly, she had GI side effects. Would like to stay on 5mg for a few months. Patient would benefit from an updated A1c, but states she cannot afford the lab order at this  time.    Medication Changes:  CONTINUE:  Glimepiride 4mg 2 times a day  Jardiance 25mg daily. Will send in to [facility] for mail order.  Actos 45mg daily. Will send medication to [facility] for refill.  Mounjaro 5mg once weekly (). Will send in to [facility] for mail order.    Future Considerations:  Recommend lifestyle changes  Titrate Mounjaro as tolerated.    Diabetes Education  Rule of 15: eating ~15 g of carbs when BG less than 80 (half cup juice, 3-4 glucose tabs).  Recognize symptoms of high and low blood sugar.   Eat a realistic healthy diet consisting of fruits, vegetables, fiber, protein food choices on a regular basis and be aware of portion/serving sizes. Reduce carbohydrate consumption and always consume with protein and fat. Avoid foods high in saturated/trans fat, high salt content, and sweets and beverages with added sugars.  Limit alcohol consumption; alcohol may affect your blood sugar and cause hypoglycemia.   Stay active and incorporate ~30 mins of exercise into your daily routine to manage your weight and increase the body's acceptance of insulin.    PATIENT GOALS   Fasting B - 130 mg/dL 2-HR Postprandial BG:   Less than 180 mg/dL A1c:   Less than 7.0 %      Mounjaro Education:   Counseled patient on MOA, expectations, side effects, duration of therapy, contraindications, administration, and monitoring parameters  Answered all patient questions and concerns  Provided detailed dosing and administration counseling to ensure proper technique.   Reviewed Mounjaro titration schedule, starting with 2.5 mg once weekly to a goal of 15 mg once weekly if tolerated  Counseled patient on the benefits of GLP-1ra glycemic control and weight loss  Reviewed storage requirements of Mounjaro when not in use, and when to administer the medication if a dose is missed.  Advised patient that they may experience improved satiety after meals and portion sizes of meals may be reduced  as doses of Mounjaro increase.         Relevant Medications    tirzepatide (Mounjaro) 5 mg/0.5 mL pen injector    pioglitazone (Actos) 45 mg tablet    empagliflozin (Jardiance) 25 mg    Other Relevant Orders    Follow Up In Advanced Primary Care - Pharmacy     Clinical Pharmacist follow-up: 10/16/2024 @ 2PM, Telehealth visit    Continue all meds under the continuation of care with the referring provider and clinical pharmacy team.    Please reach out to the Clinical Pharmacy Team if there are any further questions.     Verbal consent to manage patient's drug therapy was obtained from patient. They were informed they may decline to participate or withdraw from participation in pharmacy services at any time.    Brianna Joseph, PharmD  [phone]

## 2024-09-18 NOTE — ASSESSMENT & PLAN NOTE
Patient's goal A1c is < 7%.  Is pt at goal? No, 8.9% 2024  Patient's SMBGs are 80-140mg/dL, but limited  Rationale for plan: Last time patient increased Mounjaro to 7.5mg weekly, she had GI side effects. Would like to stay on 5mg for a few months. Patient would benefit from an updated A1c, but states she cannot afford the lab order at this time.    Medication Changes:  CONTINUE:  Glimepiride 4mg 2 times a day  Jardiance 25mg daily. Will send in to [facility] for mail order.  Actos 45mg daily. Will send medication to [facility] for refill.  Mounjaro 5mg once weekly (). Will send in to [facility] for mail order.    Future Considerations:  Recommend lifestyle changes  Titrate Mounjaro as tolerated.    Diabetes Education  Rule of 15: eating ~15 g of carbs when BG less than 80 (half cup juice, 3-4 glucose tabs).  Recognize symptoms of high and low blood sugar.   Eat a realistic healthy diet consisting of fruits, vegetables, fiber, protein food choices on a regular basis and be aware of portion/serving sizes. Reduce carbohydrate consumption and always consume with protein and fat. Avoid foods high in saturated/trans fat, high salt content, and sweets and beverages with added sugars.  Limit alcohol consumption; alcohol may affect your blood sugar and cause hypoglycemia.   Stay active and incorporate ~30 mins of exercise into your daily routine to manage your weight and increase the body's acceptance of insulin.    PATIENT GOALS   Fasting B - 130 mg/dL 2-HR Postprandial BG:   Less than 180 mg/dL A1c:   Less than 7.0 %      Mounjaro Education:   Counseled patient on MOA, expectations, side effects, duration of therapy, contraindications, administration, and monitoring parameters  Answered all patient questions and concerns  Provided detailed dosing and administration counseling to ensure proper technique.   Reviewed Mounjaro titration schedule, starting with 2.5 mg once weekly to a goal of  15 mg once weekly if tolerated  Counseled patient on the benefits of GLP-1ra glycemic control and weight loss  Reviewed storage requirements of Mounjaro when not in use, and when to administer the medication if a dose is missed.  Advised patient that they may experience improved satiety after meals and portion sizes of meals may be reduced as doses of Mounjaro increase.

## 2024-09-20 ENCOUNTER — PHARMACY VISIT (OUTPATIENT)
Dept: PHARMACY | Facility: CLINIC | Age: 54
End: 2024-09-20
Payer: MEDICARE

## 2024-09-21 ENCOUNTER — PHARMACY VISIT (OUTPATIENT)
Dept: PHARMACY | Facility: CLINIC | Age: 54
End: 2024-09-21

## 2024-10-03 DIAGNOSIS — R52 PAIN: ICD-10-CM

## 2024-10-03 DIAGNOSIS — M25.511 PAIN IN RIGHT SHOULDER: ICD-10-CM

## 2024-10-03 RX ORDER — METHOCARBAMOL 500 MG/1
TABLET, FILM COATED ORAL
Qty: 60 TABLET | Refills: 0 | Status: SHIPPED | OUTPATIENT
Start: 2024-10-03

## 2024-10-03 NOTE — TELEPHONE ENCOUNTER
Dr. Nj Pt    Refill for  methocarbamol (Robaxin) 500 mg tablet      [facility]/pharmacy #[id number] - [place], [place] - [address]

## 2024-10-15 DIAGNOSIS — E11.9 TYPE 2 DIABETES MELLITUS WITHOUT COMPLICATION, WITHOUT LONG-TERM CURRENT USE OF INSULIN (MULTI): ICD-10-CM

## 2024-10-15 RX ORDER — TIRZEPATIDE 5 MG/.5ML
5 INJECTION, SOLUTION SUBCUTANEOUS
Qty: 2 ML | Refills: 0 | OUTPATIENT
Start: 2024-10-15

## 2024-10-16 ENCOUNTER — APPOINTMENT (OUTPATIENT)
Dept: PHARMACY | Facility: HOSPITAL | Age: 54
End: 2024-10-16
Payer: COMMERCIAL

## 2024-10-16 DIAGNOSIS — E11.9 TYPE 2 DIABETES MELLITUS WITHOUT COMPLICATION, WITHOUT LONG-TERM CURRENT USE OF INSULIN (MULTI): ICD-10-CM

## 2024-10-16 DIAGNOSIS — E78.5 HYPERLIPIDEMIA, UNSPECIFIED: ICD-10-CM

## 2024-10-23 DIAGNOSIS — E11.9 TYPE 2 DIABETES MELLITUS WITHOUT COMPLICATION, WITHOUT LONG-TERM CURRENT USE OF INSULIN (MULTI): ICD-10-CM

## 2024-10-23 PROCEDURE — RXMED WILLOW AMBULATORY MEDICATION CHARGE

## 2024-10-25 ENCOUNTER — PHARMACY VISIT (OUTPATIENT)
Dept: PHARMACY | Facility: CLINIC | Age: 54
End: 2024-10-25
Payer: MEDICARE

## 2024-10-25 RX ORDER — TIRZEPATIDE 5 MG/.5ML
5 INJECTION, SOLUTION SUBCUTANEOUS
Qty: 2 ML | Refills: 0 | Status: SHIPPED | OUTPATIENT
Start: 2024-10-25

## 2024-10-28 DIAGNOSIS — E11.9 TYPE 2 DIABETES MELLITUS WITHOUT COMPLICATION, WITHOUT LONG-TERM CURRENT USE OF INSULIN (MULTI): Primary | ICD-10-CM

## 2024-10-28 RX ORDER — TIRZEPATIDE 5 MG/.5ML
5 INJECTION, SOLUTION SUBCUTANEOUS WEEKLY
Qty: 2 ML | Refills: 3 | Status: SHIPPED | OUTPATIENT
Start: 2024-10-28 | End: 2024-11-01 | Stop reason: ALTCHOICE

## 2024-11-01 ENCOUNTER — APPOINTMENT (OUTPATIENT)
Dept: PRIMARY CARE | Facility: CLINIC | Age: 54
End: 2024-11-01
Payer: COMMERCIAL

## 2024-11-01 VITALS
BODY MASS INDEX: 51.91 KG/M2 | SYSTOLIC BLOOD PRESSURE: 134 MMHG | HEIGHT: 63 IN | HEART RATE: 90 BPM | RESPIRATION RATE: 16 BRPM | OXYGEN SATURATION: 97 % | DIASTOLIC BLOOD PRESSURE: 72 MMHG | TEMPERATURE: 98 F | WEIGHT: 293 LBS

## 2024-11-01 DIAGNOSIS — E11.9 TYPE 2 DIABETES MELLITUS WITHOUT COMPLICATIONS (MULTI): ICD-10-CM

## 2024-11-01 DIAGNOSIS — K21.9 GASTRO-ESOPHAGEAL REFLUX DISEASE WITHOUT ESOPHAGITIS: ICD-10-CM

## 2024-11-01 DIAGNOSIS — E11.9 TYPE 2 DIABETES MELLITUS WITHOUT COMPLICATION, WITHOUT LONG-TERM CURRENT USE OF INSULIN (MULTI): ICD-10-CM

## 2024-11-01 DIAGNOSIS — G47.00 INSOMNIA, UNSPECIFIED: ICD-10-CM

## 2024-11-01 DIAGNOSIS — G60.9 IDIOPATHIC PERIPHERAL NEUROPATHY: ICD-10-CM

## 2024-11-01 DIAGNOSIS — M25.511 PAIN IN RIGHT SHOULDER: ICD-10-CM

## 2024-11-01 DIAGNOSIS — M54.16 LEFT LUMBAR RADICULOPATHY: Primary | ICD-10-CM

## 2024-11-01 DIAGNOSIS — R52 PAIN: ICD-10-CM

## 2024-11-01 DIAGNOSIS — R60.0 EDEMA OF EXTREMITIES: ICD-10-CM

## 2024-11-01 DIAGNOSIS — E78.5 HYPERLIPIDEMIA, UNSPECIFIED: ICD-10-CM

## 2024-11-01 DIAGNOSIS — I10 ESSENTIAL (PRIMARY) HYPERTENSION: ICD-10-CM

## 2024-11-01 DIAGNOSIS — R51.9 HEADACHE: ICD-10-CM

## 2024-11-01 LAB
POC FINGERSTICK BLOOD GLUCOSE: 222 MG/DL (ref 70–100)
POC HEMOGLOBIN A1C: 8 % (ref 4.2–6.5)

## 2024-11-01 PROCEDURE — 99214 OFFICE O/P EST MOD 30 MIN: CPT | Performed by: FAMILY MEDICINE

## 2024-11-01 PROCEDURE — RXMED WILLOW AMBULATORY MEDICATION CHARGE

## 2024-11-01 PROCEDURE — 82962 GLUCOSE BLOOD TEST: CPT | Performed by: FAMILY MEDICINE

## 2024-11-01 PROCEDURE — 83036 HEMOGLOBIN GLYCOSYLATED A1C: CPT | Performed by: FAMILY MEDICINE

## 2024-11-01 RX ORDER — GABAPENTIN 400 MG/1
400 CAPSULE ORAL 4 TIMES DAILY
Qty: 360 CAPSULE | Refills: 1 | Status: SHIPPED | OUTPATIENT
Start: 2024-11-01

## 2024-11-01 RX ORDER — ATORVASTATIN CALCIUM 10 MG/1
10 TABLET, FILM COATED ORAL DAILY
Qty: 90 TABLET | Refills: 1 | Status: SHIPPED | OUTPATIENT
Start: 2024-11-01

## 2024-11-01 RX ORDER — TIRZEPATIDE 7.5 MG/.5ML
7.5 INJECTION, SOLUTION SUBCUTANEOUS WEEKLY
Qty: 2 ML | Refills: 1 | Status: SHIPPED | OUTPATIENT
Start: 2024-11-01 | End: 2024-11-01

## 2024-11-01 RX ORDER — TIRZEPATIDE 7.5 MG/.5ML
7.5 INJECTION, SOLUTION SUBCUTANEOUS WEEKLY
Qty: 2 ML | Refills: 0 | Status: SHIPPED | OUTPATIENT
Start: 2024-11-01

## 2024-11-01 RX ORDER — OMEPRAZOLE 20 MG/1
20 CAPSULE, DELAYED RELEASE ORAL
Qty: 90 CAPSULE | Refills: 1 | Status: SHIPPED | OUTPATIENT
Start: 2024-11-01

## 2024-11-01 RX ORDER — DICLOFENAC SODIUM 75 MG/1
75 TABLET, DELAYED RELEASE ORAL 2 TIMES DAILY PRN
Qty: 60 TABLET | Refills: 0 | Status: SHIPPED | OUTPATIENT
Start: 2024-11-01 | End: 2024-12-31

## 2024-11-01 RX ORDER — PIOGLITAZONEHYDROCHLORIDE 45 MG/1
45 TABLET ORAL DAILY
Qty: 90 TABLET | Refills: 1 | Status: SHIPPED | OUTPATIENT
Start: 2024-11-01

## 2024-11-01 RX ORDER — ONDANSETRON 4 MG/1
4 TABLET, ORALLY DISINTEGRATING ORAL EVERY 6 HOURS PRN
Qty: 25 TABLET | Refills: 2 | Status: SHIPPED | OUTPATIENT
Start: 2024-11-01

## 2024-11-01 RX ORDER — TRAZODONE HYDROCHLORIDE 50 MG/1
50 TABLET ORAL NIGHTLY
Qty: 90 TABLET | Refills: 1 | Status: SHIPPED | OUTPATIENT
Start: 2024-11-01

## 2024-11-01 RX ORDER — NABUMETONE 500 MG/1
500 TABLET, FILM COATED ORAL 2 TIMES DAILY
Qty: 180 TABLET | Refills: 1 | Status: CANCELLED | OUTPATIENT
Start: 2024-11-01

## 2024-11-01 RX ORDER — TIZANIDINE HYDROCHLORIDE 4 MG/1
4 CAPSULE, GELATIN COATED ORAL 2 TIMES DAILY
Qty: 60 CAPSULE | Refills: 2 | Status: SHIPPED | OUTPATIENT
Start: 2024-11-01 | End: 2025-11-01

## 2024-11-01 RX ORDER — GLIMEPIRIDE 4 MG/1
4 TABLET ORAL 2 TIMES DAILY
Qty: 180 TABLET | Refills: 1 | Status: SHIPPED | OUTPATIENT
Start: 2024-11-01

## 2024-11-01 RX ORDER — LISINOPRIL 10 MG/1
10 TABLET ORAL DAILY
Qty: 90 TABLET | Refills: 1 | Status: SHIPPED | OUTPATIENT
Start: 2024-11-01

## 2024-11-01 ASSESSMENT — ENCOUNTER SYMPTOMS
HEADACHES: 0
ABDOMINAL DISTENTION: 0
APPETITE CHANGE: 0
HEMATURIA: 0
FATIGUE: 0
EYE PAIN: 0
RECTAL PAIN: 0
FEVER: 0
NERVOUS/ANXIOUS: 0
SHORTNESS OF BREATH: 0
DEPRESSION: 0
BLOOD IN STOOL: 0
PHOTOPHOBIA: 0
ABDOMINAL PAIN: 0
CONSTITUTIONAL NEGATIVE: 1
DECREASED CONCENTRATION: 0
MYALGIAS: 0
AGITATION: 0
TROUBLE SWALLOWING: 0
DYSURIA: 0
SORE THROAT: 0
LOSS OF SENSATION IN FEET: 0
SINUS PRESSURE: 0
CONFUSION: 0
ACTIVITY CHANGE: 0
SLEEP DISTURBANCE: 0
STRIDOR: 0
POLYDIPSIA: 0
COLOR CHANGE: 0
SINUS PAIN: 0
SEIZURES: 0
RHINORRHEA: 0
DIARRHEA: 0
CONSTIPATION: 0
PALPITATIONS: 0
FLANK PAIN: 0
OCCASIONAL FEELINGS OF UNSTEADINESS: 0
DYSPHORIC MOOD: 0
ADENOPATHY: 0
SPEECH DIFFICULTY: 0
CHEST TIGHTNESS: 0
POLYPHAGIA: 0
DIZZINESS: 0
COUGH: 0
NECK STIFFNESS: 0

## 2024-11-01 ASSESSMENT — PAIN SCALES - GENERAL: PAINLEVEL_OUTOF10: 8

## 2024-11-03 ASSESSMENT — ENCOUNTER SYMPTOMS
ARTHRALGIAS: 1
BACK PAIN: 1

## 2024-11-04 ENCOUNTER — TELEPHONE (OUTPATIENT)
Dept: PRIMARY CARE | Facility: CLINIC | Age: 54
End: 2024-11-04
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
## 2024-11-05 ENCOUNTER — PHARMACY VISIT (OUTPATIENT)
Dept: PHARMACY | Facility: CLINIC | Age: 54
End: 2024-11-05
Payer: MEDICARE

## 2024-11-06 ENCOUNTER — APPOINTMENT (OUTPATIENT)
Dept: PHARMACY | Facility: HOSPITAL | Age: 54
End: 2024-11-06
Payer: COMMERCIAL

## 2024-11-06 DIAGNOSIS — E11.9 TYPE 2 DIABETES MELLITUS WITHOUT COMPLICATION, WITHOUT LONG-TERM CURRENT USE OF INSULIN (MULTI): ICD-10-CM

## 2024-11-06 NOTE — PROGRESS NOTES
Clinical Pharmacy Appointment    Patient ID: Yane Lang is a 54 y.o. female who presents for Diabetes.    Pt is here for Follow Up appointment.     Referring Provider: Rj Nj DO  PCP: Rj Nj DO   Last visit with PCP: 11/1/2024   Next visit with PCP: 1/2/2025     Patient Assistance for Mounjaro and Jardiance approved through 11/03/2024. Will have to be renewed prior to that date to prevent lapse in coverage. Medication(s) will be received at no cost to patient from [facility].       Subjective     HPI    DIABETES MELLITUS TYPE 2:    Diagnosed with diabetes:  8 years. Known diabetic complications: peripheral neuropathy.  Does patient follow with Endocrinology: no  Last optometry exam: none  Most recent visit in Podiatry: 2021-- patient denies sores or cuts on feet today      Current diabetic medications include:  Glimepiride 4mg twice a day  Jardiance 25mg daily  Actos 45mg daily  Mounjaro 5mg once weekly (Sunday)     Adverse Effects: none reported    Glucose Readings:  Glucometer/CGM Type: OneTouch    Current home BG readings: 90-160mg/dL    Any episodes of hypoglycemia? No, none reported .  Did patient treat episode of hypoglycemia appropriately? N/A  Does the patient have a prescription for ready-to-use Glucagon? N/a  Does pt have proteinuria? N/A, UACR of 54 5/2021    Secondary Prevention:  Statin? Yes- Lipitor 10mg - take 1 tab every day  ACE-I/ARB? Yes- Lisinopril 10mg - take 1 tab every day  Aspirin? No    Pertinent PMH Review:  PMH of Pancreatitis: No  PMH of Retinopathy: No  PMH of Urinary Tract Infections: No  PMH of MTC: No    Drug Interactions  No relevant drug interactions were noted.    Medication System Management  Patient's preferred [facility]: [facility] [place]  Adherence/Organization: was getting medications through mail order, but states she has an outstanding balance there and cannot get any meds sent out to her; prefers [facility] in [place]  now  Affordability/Accessibility:  Patient Assistance      Objective   Allergies   Allergen Reactions    Cephalexin Itching     GI UPSET    Iodinated Contrast Media Unknown    Meperidine Unknown    Morphine Itching    Other Swelling    Oxycodone Hcl-Oxycodone-Asa Unknown    Oxycodone-Acetaminophen Unknown    Polymyxin B Other    Propoxyphene N-Acetaminophen Unknown     Social History     Social History Narrative    Not on file      Medication Review  Current Outpatient Medications   Medication Instructions    acetaminophen (Tylenol 8 HOUR) 650 mg ER tablet Take 1 tablet by mouth every 8 hours if needed for mild pain (1 - 3). Do not crush, chew, or split.    atorvastatin (LIPITOR) 10 mg, oral, Daily    Banophen 25 mg capsule TAKE 2 CAPSULES BY MOUTH EVERY 4 HOURS    blood sugar diagnostic (OneTouch Verio test strips) strip USE 1 STRIP 3 TIMES DAILY.    cholecalciferol (VITAMIN D-3) 50,000 Units, oral, Once Weekly    diclofenac (VOLTAREN) 75 mg, oral, 2 times daily PRN, Do not crush, chew, or split.    dicyclomine (Bentyl) 10 mg capsule TAKE 1 CAPSULE BY MOUTH FOUR TIMES DAILY    empagliflozin (JARDIANCE) 25 mg, oral, Daily    furosemide (LASIX) 20 mg, oral, Daily    gabapentin (NEURONTIN) 400 mg, oral, 4 times daily    glimepiride (AMARYL) 4 mg, oral, 2 times daily    ibuprofen 600 mg tablet TAKE 1 TABLET BY MOUTH THREE TIMES A DAY AS NEEDED FOR PAIN    lancets misc 3 times daily    lidocaine (Lidoderm) 5 % patch APPLY ONE PATCH TO THE AFFECTED AREA(S) DAILY. 12 HOURS on, 12 HOURS off.    lisinopril 10 mg, oral, Daily, for blood pressure    methocarbamol (Robaxin) 500 mg tablet TAKE 1 TO 2 TABLETS BY MOUTH NIGHTLY    Mounjaro 7.5 mg, subcutaneous, Weekly    nabumetone (RELAFEN) 500 mg, oral, 2 times daily    omeprazole (PRILOSEC) 20 mg, oral, Daily before breakfast    ondansetron ODT (ZOFRAN-ODT) 4 mg, oral, Every 6 hours PRN, DISSOLVE  ON THE TONGUE    pioglitazone (ACTOS) 45 mg, oral, Daily    tiZANidine  (ZANAFLEX) 4 mg, oral, 2 times daily    traZODone (DESYREL) 50 mg, oral, Nightly      Vitals  BP Readings from Last 2 Encounters:   11/01/24 134/72   04/22/24 118/80     BMI Readings from Last 1 Encounters:   11/01/24 53.32 kg/m²      Labs  A1C  Lab Results   Component Value Date    HGBA1C 8.0 (A) 11/01/2024    HGBA1C 8.9 (H) 04/05/2024    HGBA1C 9.6 (A) 07/13/2023     BMP  Lab Results   Component Value Date    CALCIUM 9.4 04/05/2024     (L) 04/05/2024    K 4.6 04/05/2024    CO2 24 04/05/2024    CL 99 04/05/2024    BUN 18 04/05/2024    CREATININE 0.73 04/05/2024    EGFR >90 04/05/2024     LFTs  Lab Results   Component Value Date    ALT 27 04/05/2024    AST 21 04/05/2024    ALKPHOS 103 04/05/2024    BILITOT 0.6 04/05/2024     FLP  Lab Results   Component Value Date    TRIG 166 (H) 10/04/2023    CHOL 161 10/04/2023    LDLF 87 04/22/2022    LDLCALC 85 (L) 10/04/2023    HDL 43.2 10/04/2023     Urine Microalbumin  Lab Results   Component Value Date    MICROALBCREA 54.7 (H) 05/11/2021     Weight Management  Wt Readings from Last 3 Encounters:   11/01/24 137 kg (301 lb)   04/22/24 132 kg (291 lb)   10/05/23 128 kg (282 lb)      There is no height or weight on file to calculate BMI.     Assessment/Plan   Problem List Items Addressed This Visit       Type 2 diabetes mellitus without complications (Multi)     Patient's goal A1c is < 7%.  Is pt at goal? No, 8.0% (11/1/2024)  Patient's SMBGs are 90-160mg/dL, but limited  Rationale for plan: Mounjaro was increased by PCP. Patient has not started this increase due to having one more dose of 5mg left, then will increase Mounjaro.    Medication Changes:  CONTINUE:  Glimepiride 4mg 2 times a day  Jardiance 25mg daily  Actos 45mg daily  INCREASE  Mounjaro to 7.5mg once weekly (Sunday). Patient has medication at home already.    Future Considerations:  Recommend lifestyle changes  Titrate Mounjaro as tolerated.    Diabetes Education  Rule of 15: eating ~15 g of carbs when BG  less than 80 (half cup juice, 3-4 glucose tabs).  Recognize symptoms of high and low blood sugar.   Eat a realistic healthy diet consisting of fruits, vegetables, fiber, protein food choices on a regular basis and be aware of portion/serving sizes. Reduce carbohydrate consumption and always consume with protein and fat. Avoid foods high in saturated/trans fat, high salt content, and sweets and beverages with added sugars.  Limit alcohol consumption; alcohol may affect your blood sugar and cause hypoglycemia.   Stay active and incorporate ~30 mins of exercise into your daily routine to manage your weight and increase the body's acceptance of insulin.    PATIENT GOALS   Fasting B - 130 mg/dL 2-HR Postprandial BG:   Less than 180 mg/dL A1c:   Less than 7.0 %      Mounjaro Education:   Counseled patient on MOA, expectations, side effects, duration of therapy, contraindications, administration, and monitoring parameters  Answered all patient questions and concerns  Provided detailed dosing and administration counseling to ensure proper technique.   Reviewed Mounjaro titration schedule, starting with 2.5 mg once weekly to a goal of 15 mg once weekly if tolerated  Counseled patient on the benefits of GLP-1ra glycemic control and weight loss  Reviewed storage requirements of Mounjaro when not in use, and when to administer the medication if a dose is missed.  Advised patient that they may experience improved satiety after meals and portion sizes of meals may be reduced as doses of Mounjaro increase.    Empagliflozin (Jardiance) Education:  Counseled patient on Empagliflozin (Jardiance) MOA, expectations, side effects, duration of therapy, administration, and monitoring parameters.  Reviewed the benefits of SGLT-2i therapy, such as glycemic control and kidney and CV protection.  Advised patient to practice proper  hygiene to reduce risk of UTIs or yeast infections.  Advised patient to maintain adequate fluid intake  to remain hydrated while on SGLT2i therapy.  Answered all patient questions and concerns.         Relevant Orders    Referral to Clinical Pharmacy       Clinical Pharmacist follow-up: 12/4/2024 at 2:00PM, Telehealth visit    Continue all meds under the continuation of care with the referring provider and clinical pharmacy team.    Please reach out to the Clinical Pharmacy Team if there are any further questions.     Verbal consent to manage patient's drug therapy was obtained from patient. They were informed they may decline to participate or withdraw from participation in pharmacy services at any time.    Brianna Joseph, PharmD  [phone]

## 2024-11-06 NOTE — ASSESSMENT & PLAN NOTE
Patient's goal A1c is < 7%.  Is pt at goal? No, 8.0% (2024)  Patient's SMBGs are 90-160mg/dL, but limited  Rationale for plan: Mounjaro was increased by PCP. Patient has not started this increase due to having one more dose of 5mg left, then will increase Mounjaro.    Medication Changes:  CONTINUE:  Glimepiride 4mg 2 times a day  Jardiance 25mg daily  Actos 45mg daily  INCREASE  Mounjaro to 7.5mg once weekly (). Patient has medication at home already.    Future Considerations:  Recommend lifestyle changes  Titrate Mounjaro as tolerated.    Diabetes Education  Rule of 15: eating ~15 g of carbs when BG less than 80 (half cup juice, 3-4 glucose tabs).  Recognize symptoms of high and low blood sugar.   Eat a realistic healthy diet consisting of fruits, vegetables, fiber, protein food choices on a regular basis and be aware of portion/serving sizes. Reduce carbohydrate consumption and always consume with protein and fat. Avoid foods high in saturated/trans fat, high salt content, and sweets and beverages with added sugars.  Limit alcohol consumption; alcohol may affect your blood sugar and cause hypoglycemia.   Stay active and incorporate ~30 mins of exercise into your daily routine to manage your weight and increase the body's acceptance of insulin.    PATIENT GOALS   Fasting B - 130 mg/dL 2-HR Postprandial BG:   Less than 180 mg/dL A1c:   Less than 7.0 %      Mounjaro Education:   Counseled patient on MOA, expectations, side effects, duration of therapy, contraindications, administration, and monitoring parameters  Answered all patient questions and concerns  Provided detailed dosing and administration counseling to ensure proper technique.   Reviewed Mounjaro titration schedule, starting with 2.5 mg once weekly to a goal of 15 mg once weekly if tolerated  Counseled patient on the benefits of GLP-1ra glycemic control and weight loss  Reviewed storage requirements of Mounjaro when not in use, and when  to administer the medication if a dose is missed.  Advised patient that they may experience improved satiety after meals and portion sizes of meals may be reduced as doses of Mounjaro increase.    Empagliflozin (Jardiance) Education:  Counseled patient on Empagliflozin (Jardiance) MOA, expectations, side effects, duration of therapy, administration, and monitoring parameters.  Reviewed the benefits of SGLT-2i therapy, such as glycemic control and kidney and CV protection.  Advised patient to practice proper  hygiene to reduce risk of UTIs or yeast infections.  Advised patient to maintain adequate fluid intake to remain hydrated while on SGLT2i therapy.  Answered all patient questions and concerns.

## 2024-11-13 ENCOUNTER — HOSPITAL ENCOUNTER (OUTPATIENT)
Dept: RADIOLOGY | Facility: CLINIC | Age: 54
Discharge: HOME | End: 2024-11-13
Payer: COMMERCIAL

## 2024-11-13 DIAGNOSIS — M54.16 LEFT LUMBAR RADICULOPATHY: ICD-10-CM

## 2024-11-13 PROCEDURE — 72148 MRI LUMBAR SPINE W/O DYE: CPT | Performed by: STUDENT IN AN ORGANIZED HEALTH CARE EDUCATION/TRAINING PROGRAM

## 2024-11-13 PROCEDURE — 72148 MRI LUMBAR SPINE W/O DYE: CPT

## 2024-11-15 ENCOUNTER — TELEPHONE (OUTPATIENT)
Dept: PHARMACY | Facility: HOSPITAL | Age: 54
End: 2024-11-15
Payer: COMMERCIAL

## 2024-11-15 DIAGNOSIS — E11.9 TYPE 2 DIABETES MELLITUS WITHOUT COMPLICATION, WITHOUT LONG-TERM CURRENT USE OF INSULIN (MULTI): Primary | ICD-10-CM

## 2024-11-15 PROCEDURE — RXMED WILLOW AMBULATORY MEDICATION CHARGE

## 2024-11-15 RX ORDER — TIRZEPATIDE 5 MG/.5ML
5 INJECTION, SOLUTION SUBCUTANEOUS WEEKLY
Qty: 2 ML | Refills: 0 | Status: SHIPPED | OUTPATIENT
Start: 2024-11-15

## 2024-11-15 NOTE — TELEPHONE ENCOUNTER
Patient reached out needing refills on Mounjaro.   Will send refill to [facility].      Patient called to discuss side effects with increased Mounjaro dose. Patient states she has been having stomach cramps and diarrhea since increasing to 7.5mg. Will decrease dose back down to 5mg weekly.    Last visit with Rj Nj, DO: 11/1/2024    Last PharmD follow up: 11/6/2024  Next PharmD follow up: 12/4/2024    Brianna Joseph, PharmD

## 2024-11-18 DIAGNOSIS — R60.0 EDEMA OF EXTREMITIES: ICD-10-CM

## 2024-11-18 RX ORDER — NABUMETONE 500 MG/1
500 TABLET, FILM COATED ORAL 2 TIMES DAILY
Qty: 60 TABLET | Refills: 1 | Status: SHIPPED | OUTPATIENT
Start: 2024-11-18

## 2024-11-19 ENCOUNTER — PHARMACY VISIT (OUTPATIENT)
Dept: PHARMACY | Facility: CLINIC | Age: 54
End: 2024-11-19
Payer: MEDICARE

## 2024-11-19 NOTE — TELEPHONE ENCOUNTER
Had extensive conversation with patient. Office note is good to go.    Pt states when the MRI was ordered, she wanted it to be of the buttock/thigh area and it was of the L Spine.     She has a knot and point of tenderness below he buttock. She wanted an MRI for that. And no answers. Now she is asking if the correct test was ordered. Please advise and call PT ASAP.

## 2024-11-23 DIAGNOSIS — M54.16 LEFT LUMBAR RADICULOPATHY: ICD-10-CM

## 2024-11-25 RX ORDER — TIZANIDINE HYDROCHLORIDE 4 MG/1
4 CAPSULE, GELATIN COATED ORAL 2 TIMES DAILY
Qty: 180 CAPSULE | Refills: 0 | Status: SHIPPED | OUTPATIENT
Start: 2024-11-25 | End: 2025-11-25

## 2024-11-25 NOTE — TELEPHONE ENCOUNTER
Recent Visits  Date Type Provider Dept   11/01/24 Office Visit Rj Nj, DO [facility]   Showing recent visits within past 180 days and meeting all other requirements  Future Appointments  Date Type Provider Dept   01/02/25 Appointment Rj Nj DO [facility]   Showing future appointments within next 90 days and meeting all other requirements

## 2024-12-04 ENCOUNTER — APPOINTMENT (OUTPATIENT)
Dept: PHARMACY | Facility: HOSPITAL | Age: 54
End: 2024-12-04
Payer: COMMERCIAL

## 2024-12-04 DIAGNOSIS — E11.9 TYPE 2 DIABETES MELLITUS WITHOUT COMPLICATION, WITHOUT LONG-TERM CURRENT USE OF INSULIN (MULTI): ICD-10-CM

## 2024-12-04 PROCEDURE — RXMED WILLOW AMBULATORY MEDICATION CHARGE

## 2024-12-04 NOTE — ASSESSMENT & PLAN NOTE
Patient's goal A1c is < 7%.  Is pt at goal? No, 8.0% (2024)  Patient's SMBGs are 90-160mg/dL, but limited  Rationale for plan: Mounjaro was increased by PCP. Patient experiencing some diarrhea. Will continue 7.5mg and follow up with side effects.    Medication Changes:  CONTINUE:  Glimepiride 4mg 2 times a day  Jardiance 25mg daily  Actos 45mg daily  Mounjaro to 7.5mg once weekly (). Patient has medication at home already.    Future Considerations:  Recommend lifestyle changes  Titrate Mounjaro as tolerated.    Diabetes Education  Rule of 15: eating ~15 g of carbs when BG less than 80 (half cup juice, 3-4 glucose tabs).  Recognize symptoms of high and low blood sugar.   Eat a realistic healthy diet consisting of fruits, vegetables, fiber, protein food choices on a regular basis and be aware of portion/serving sizes. Reduce carbohydrate consumption and always consume with protein and fat. Avoid foods high in saturated/trans fat, high salt content, and sweets and beverages with added sugars.  Limit alcohol consumption; alcohol may affect your blood sugar and cause hypoglycemia.   Stay active and incorporate ~30 mins of exercise into your daily routine to manage your weight and increase the body's acceptance of insulin.    PATIENT GOALS   Fasting B - 130 mg/dL 2-HR Postprandial BG:   Less than 180 mg/dL A1c:   Less than 7.0 %      Mounjaro Education:   Counseled patient on MOA, expectations, side effects, duration of therapy, contraindications, administration, and monitoring parameters  Answered all patient questions and concerns  Provided detailed dosing and administration counseling to ensure proper technique.   Reviewed Mounjaro titration schedule, starting with 2.5 mg once weekly to a goal of 15 mg once weekly if tolerated  Counseled patient on the benefits of GLP-1ra glycemic control and weight loss  Reviewed storage requirements of Mounjaro when not in use, and when to administer the medication  if a dose is missed.  Advised patient that they may experience improved satiety after meals and portion sizes of meals may be reduced as doses of Mounjaro increase.    Empagliflozin (Jardiance) Education:  Counseled patient on Empagliflozin (Jardiance) MOA, expectations, side effects, duration of therapy, administration, and monitoring parameters.  Reviewed the benefits of SGLT-2i therapy, such as glycemic control and kidney and CV protection.  Advised patient to practice proper  hygiene to reduce risk of UTIs or yeast infections.  Advised patient to maintain adequate fluid intake to remain hydrated while on SGLT2i therapy.  Answered all patient questions and concerns.

## 2024-12-04 NOTE — PROGRESS NOTES
Clinical Pharmacy Appointment    Patient ID: Yane Lang is a 54 y.o. female who presents for Diabetes.    Pt is here for Follow Up appointment.     Referring Provider: Rj Nj DO  PCP: Rj Nj DO   Last visit with PCP: 11/1/2024   Next visit with PCP: 1/2/2025     Patient Assistance for Mounjaro and Jardiance approved through 11/11/2025. Will have to be renewed prior to that date to prevent lapse in coverage. Medication(s) will be received at no cost to patient from [facility].       Subjective     HPI    DIABETES MELLITUS TYPE 2:    Diagnosed with diabetes:  8 years. Known diabetic complications: peripheral neuropathy.  Does patient follow with Endocrinology: no  Last optometry exam: none  Most recent visit in Podiatry: 2021-- patient denies sores or cuts on feet today      Current diabetic medications include:  Glimepiride 4mg twice a day  Jardiance 25mg daily  Actos 45mg daily  Mounjaro 7.5mg once weekly (Sunday)     Adverse Effects: diarrhea    Glucose Readings:  Glucometer/CGM Type: OneTouch  Current home BG readings: 90-160mg/dL    Any episodes of hypoglycemia? No, none reported .  Did patient treat episode of hypoglycemia appropriately? N/A  Does the patient have a prescription for ready-to-use Glucagon? N/a  Does pt have proteinuria? N/A, UACR of 54 5/2021    Secondary Prevention:  Statin? Yes- Lipitor 10mg take 1 tab every day  ACE-I/ARB? Yes- Lisinopril 10mg take 1 tab every day  Aspirin? No    Pertinent PMH Review:  PMH of Pancreatitis: No  PMH of Retinopathy: No  PMH of Urinary Tract Infections: No  PMH of MTC: No    Drug Interactions  No relevant drug interactions were noted.    Medication System Management  [facility] [place]  Adherence/Organization: was getting medications through mail order, but states she has an outstanding balance there and cannot get any meds sent out to her; [facility] [place] [facility]  Affordability/Accessibility:   Patient Assistance      Objective   Allergies   Allergen Reactions    Cephalexin Itching     GI UPSET    Iodinated Contrast Media Unknown    Meperidine Unknown    Morphine Itching    Other Swelling    Oxycodone Hcl-Oxycodone-Asa Unknown    Oxycodone-Acetaminophen Unknown    Polymyxin B Other    Propoxyphene N-Acetaminophen Unknown     Social History     Social History Narrative    Not on file      Medication Review  Current Outpatient Medications   Medication Instructions    acetaminophen (Tylenol 8 HOUR) 650 mg ER tablet Take 1 tablet by mouth every 8 hours if needed for mild pain (1 - 3). Do not crush, chew, or split.    atorvastatin (LIPITOR) 10 mg, oral, Daily    Banophen 25 mg capsule TAKE 2 CAPSULES BY MOUTH EVERY 4 HOURS    blood sugar diagnostic (OneTouch Verio test strips) strip USE 1 STRIP 3 TIMES DAILY.    cholecalciferol (VITAMIN D-3) 50,000 Units, oral, Once Weekly    diclofenac (VOLTAREN) 75 mg, oral, 2 times daily PRN, Do not crush, chew, or split.    dicyclomine (Bentyl) 10 mg capsule TAKE 1 CAPSULE BY MOUTH FOUR TIMES DAILY    empagliflozin (JARDIANCE) 25 mg, oral, Daily    furosemide (LASIX) 20 mg, oral, Daily    gabapentin (NEURONTIN) 400 mg, oral, 4 times daily    glimepiride (AMARYL) 4 mg, oral, 2 times daily    ibuprofen 600 mg tablet TAKE 1 TABLET BY MOUTH THREE TIMES A DAY AS NEEDED FOR PAIN    lancets misc 3 times daily    lidocaine (Lidoderm) 5 % patch APPLY ONE PATCH TO THE AFFECTED AREA(S) DAILY. 12 HOURS on, 12 HOURS off.    lisinopril 10 mg, oral, Daily, for blood pressure    methocarbamol (Robaxin) 500 mg tablet TAKE 1 TO 2 TABLETS BY MOUTH NIGHTLY    Mounjaro 5 mg, subcutaneous, Weekly    nabumetone (RELAFEN) 500 mg, oral, 2 times daily    omeprazole (PRILOSEC) 20 mg, oral, Daily before breakfast    ondansetron ODT (ZOFRAN-ODT) 4 mg, oral, Every 6 hours PRN, DISSOLVE  ON THE TONGUE    pioglitazone (ACTOS) 45 mg, oral, Daily    tiZANidine (ZANAFLEX) 4 mg, oral, 2 times daily     traZODone (DESYREL) 50 mg, oral, Nightly      Vitals  BP Readings from Last 2 Encounters:   11/01/24 134/72   04/22/24 118/80     BMI Readings from Last 1 Encounters:   11/13/24 53.14 kg/m²      Labs  A1C  Lab Results   Component Value Date    HGBA1C 8.0 (A) 11/01/2024    HGBA1C 8.9 (H) 04/05/2024    HGBA1C 9.6 (A) 07/13/2023     BMP  Lab Results   Component Value Date    CALCIUM 9.4 04/05/2024     (L) 04/05/2024    K 4.6 04/05/2024    CO2 24 04/05/2024    CL 99 04/05/2024    BUN 18 04/05/2024    CREATININE 0.73 04/05/2024    EGFR >90 04/05/2024     LFTs  Lab Results   Component Value Date    ALT 27 04/05/2024    AST 21 04/05/2024    ALKPHOS 103 04/05/2024    BILITOT 0.6 04/05/2024     FLP  Lab Results   Component Value Date    TRIG 166 (H) 10/04/2023    CHOL 161 10/04/2023    LDLF 87 04/22/2022    LDLCALC 85 (L) 10/04/2023    HDL 43.2 10/04/2023     Urine Microalbumin  Lab Results   Component Value Date    MICROALBCREA 54.7 (H) 05/11/2021     Weight Management  Wt Readings from Last 3 Encounters:   11/13/24 136 kg (300 lb)   11/01/24 137 kg (301 lb)   04/22/24 132 kg (291 lb)      There is no height or weight on file to calculate BMI.     Assessment/Plan   Problem List Items Addressed This Visit       Type 2 diabetes mellitus without complications (Multi)     Patient's goal A1c is < 7%.  Is pt at goal? No, 8.0% (11/1/2024)  Patient's SMBGs are 90-160mg/dL, but limited  Rationale for plan: Mounjaro was increased by PCP. Patient experiencing some diarrhea. Will continue 7.5mg and follow up with side effects.    Medication Changes:  CONTINUE:  Glimepiride 4mg 2 times a day  Jardiance 25mg daily  Actos 45mg daily  Mounjaro to 7.5mg once weekly (Sunday). Patient has medication at home already.    Future Considerations:  Recommend lifestyle changes  Titrate Mounjaro as tolerated.    Diabetes Education  Rule of 15: eating ~15 g of carbs when BG less than 80 (half cup juice, 3-4 glucose tabs).  Recognize symptoms of  high and low blood sugar.   Eat a realistic healthy diet consisting of fruits, vegetables, fiber, protein food choices on a regular basis and be aware of portion/serving sizes. Reduce carbohydrate consumption and always consume with protein and fat. Avoid foods high in saturated/trans fat, high salt content, and sweets and beverages with added sugars.  Limit alcohol consumption; alcohol may affect your blood sugar and cause hypoglycemia.   Stay active and incorporate ~30 mins of exercise into your daily routine to manage your weight and increase the body's acceptance of insulin.    PATIENT GOALS   Fasting B - 130 mg/dL 2-HR Postprandial BG:   Less than 180 mg/dL A1c:   Less than 7.0 %      Mounjaro Education:   Counseled patient on MOA, expectations, side effects, duration of therapy, contraindications, administration, and monitoring parameters  Answered all patient questions and concerns  Provided detailed dosing and administration counseling to ensure proper technique.   Reviewed Mounjaro titration schedule, starting with 2.5 mg once weekly to a goal of 15 mg once weekly if tolerated  Counseled patient on the benefits of GLP-1ra glycemic control and weight loss  Reviewed storage requirements of Mounjaro when not in use, and when to administer the medication if a dose is missed.  Advised patient that they may experience improved satiety after meals and portion sizes of meals may be reduced as doses of Mounjaro increase.    Empagliflozin (Jardiance) Education:  Counseled patient on Empagliflozin (Jardiance) MOA, expectations, side effects, duration of therapy, administration, and monitoring parameters.  Reviewed the benefits of SGLT-2i therapy, such as glycemic control and kidney and CV protection.  Advised patient to practice proper  hygiene to reduce risk of UTIs or yeast infections.  Advised patient to maintain adequate fluid intake to remain hydrated while on SGLT2i therapy.  Answered all patient  questions and concerns.         Relevant Medications    empagliflozin (Jardiance) 25 mg    Other Relevant Orders    Referral to Clinical Pharmacy       Clinical Pharmacist follow-up: 12/18/2024 at 2:00PM, Telehealth visit    Continue all meds under the continuation of care with the referring provider and clinical pharmacy team.    Please reach out to the Clinical Pharmacy Team if there are any further questions.     Verbal consent to manage patient's drug therapy was obtained from patient. They were informed they may decline to participate or withdraw from participation in pharmacy services at any time.    Brianna Joseph, PharmD  [phone]

## 2024-12-06 ENCOUNTER — PHARMACY VISIT (OUTPATIENT)
Dept: PHARMACY | Facility: CLINIC | Age: 54
End: 2024-12-06
Payer: MEDICARE

## 2024-12-10 DIAGNOSIS — E11.9 TYPE 2 DIABETES MELLITUS WITHOUT COMPLICATION, WITHOUT LONG-TERM CURRENT USE OF INSULIN (MULTI): ICD-10-CM

## 2024-12-18 ENCOUNTER — APPOINTMENT (OUTPATIENT)
Dept: PHARMACY | Facility: HOSPITAL | Age: 54
End: 2024-12-18
Payer: COMMERCIAL

## 2024-12-18 DIAGNOSIS — E11.9 TYPE 2 DIABETES MELLITUS WITHOUT COMPLICATION, WITHOUT LONG-TERM CURRENT USE OF INSULIN (MULTI): ICD-10-CM

## 2024-12-18 RX ORDER — TIRZEPATIDE 5 MG/.5ML
5 INJECTION, SOLUTION SUBCUTANEOUS WEEKLY
Qty: 2 ML | Refills: 0 | OUTPATIENT
Start: 2024-12-18

## 2024-12-18 NOTE — PROGRESS NOTES
Clinical Pharmacy Appointment    Patient ID: Yane Lang is a 54 y.o. female who presents for Diabetes.    Pt is here for Follow Up appointment.     Referring Provider: Rj Nj DO  PCP: Rj Nj DO   Last visit with PCP: 11/1/2024   Next visit with PCP: 1/2/2025     Patient Assistance for Mounjaro and Jardiance approved through 11/11/2025. Will have to be renewed prior to that date to prevent lapse in coverage. Medication(s) will be received at no cost to patient from [facility].       Subjective     HPI    DIABETES MELLITUS TYPE 2:    Diagnosed with diabetes:  8 years. Known diabetic complications: peripheral neuropathy.  Does patient follow with Endocrinology: no  Last optometry exam: none  Most recent visit in Podiatry: 2021-- patient denies sores or cuts on feet today      Current diabetic medications include:  Glimepiride 4mg twice a day  Jardiance 25mg daily  Actos 45mg daily  Mounjaro 7.5mg once weekly (Sunday)     Adverse Effects: diarrhea    Glucose Readings:  Glucometer/CGM Type: OneTouch  Current home BG readings: 90-160mg/dL    Any episodes of hypoglycemia? No, none reported .  Did patient treat episode of hypoglycemia appropriately? N/A  Does the patient have a prescription for ready-to-use Glucagon? N/a  Does pt have proteinuria? N/A, UACR of 54 5/2021    Secondary Prevention:  Statin? Yes- Lipitor 10mg take 1 tab every day  ACE-I/ARB? Yes- Lisinopril 10mg take 1 tab every day  Aspirin? No    Pertinent PMH Review:  PMH of Pancreatitis: No  PMH of Retinopathy: No  PMH of Urinary Tract Infections: No  PMH of MTC: No    Drug Interactions  No relevant drug interactions were noted.    Medication System Management  Patient's preferred pharmacy: [facility] [place]  Adherence/Organization: was getting medications through mail order, but states she has an outstanding balance there and cannot get any meds sent out to her; prefers [facility] in [place] now  Affordability/Accessibility:   Patient Assistance      Objective   Allergies   Allergen Reactions    Cephalexin Itching     GI UPSET    Iodinated Contrast Media Unknown    Meperidine Unknown    Morphine Itching    Other Swelling    Oxycodone Hcl-Oxycodone-Asa Unknown    Oxycodone-Acetaminophen Unknown    Polymyxin B Other    Propoxyphene N-Acetaminophen Unknown     Social History     Social History Narrative    Not on file      Medication Review  Current Outpatient Medications   Medication Instructions    acetaminophen (Tylenol 8 HOUR) 650 mg ER tablet Take 1 tablet by mouth every 8 hours if needed for mild pain (1 - 3). Do not crush, chew, or split.    atorvastatin (LIPITOR) 10 mg, oral, Daily    Banophen 25 mg capsule TAKE 2 CAPSULES BY MOUTH EVERY 4 HOURS    blood sugar diagnostic (OneTouch Verio test strips) strip USE 1 STRIP 3 TIMES DAILY.    cholecalciferol (VITAMIN D-3) 50,000 Units, oral, Once Weekly    diclofenac (VOLTAREN) 75 mg, oral, 2 times daily PRN, Do not crush, chew, or split.    dicyclomine (Bentyl) 10 mg capsule TAKE 1 CAPSULE BY MOUTH FOUR TIMES DAILY    furosemide (LASIX) 20 mg, oral, Daily    gabapentin (NEURONTIN) 400 mg, oral, 4 times daily    glimepiride (AMARYL) 4 mg, oral, 2 times daily    ibuprofen 600 mg tablet TAKE 1 TABLET BY MOUTH THREE TIMES A DAY AS NEEDED FOR PAIN    Jardiance 25 mg, oral, Daily    lancets misc 3 times daily    lidocaine (Lidoderm) 5 % patch APPLY ONE PATCH TO THE AFFECTED AREA(S) DAILY. 12 HOURS on, 12 HOURS off.    lisinopril 10 mg, oral, Daily, for blood pressure    methocarbamol (Robaxin) 500 mg tablet TAKE 1 TO 2 TABLETS BY MOUTH NIGHTLY    Mounjaro 5 mg, subcutaneous, Weekly    nabumetone (RELAFEN) 500 mg, oral, 2 times daily    omeprazole (PRILOSEC) 20 mg, oral, Daily before breakfast    ondansetron ODT (ZOFRAN-ODT) 4 mg, oral, Every 6 hours PRN, DISSOLVE  ON THE TONGUE    pioglitazone (ACTOS) 45 mg, oral, Daily    tiZANidine (ZANAFLEX) 4 mg, oral, 2 times daily    traZODone (DESYREL) 50  mg, oral, Nightly      Vitals  BP Readings from Last 2 Encounters:   11/01/24 134/72   04/22/24 118/80     BMI Readings from Last 1 Encounters:   11/13/24 53.14 kg/m²      Labs  A1C  Lab Results   Component Value Date    HGBA1C 8.0 (A) 11/01/2024    HGBA1C 8.9 (H) 04/05/2024    HGBA1C 9.6 (A) 07/13/2023     BMP  Lab Results   Component Value Date    CALCIUM 9.4 04/05/2024     (L) 04/05/2024    K 4.6 04/05/2024    CO2 24 04/05/2024    CL 99 04/05/2024    BUN 18 04/05/2024    CREATININE 0.73 04/05/2024    EGFR >90 04/05/2024     LFTs  Lab Results   Component Value Date    ALT 27 04/05/2024    AST 21 04/05/2024    ALKPHOS 103 04/05/2024    BILITOT 0.6 04/05/2024     FLP  Lab Results   Component Value Date    TRIG 166 (H) 10/04/2023    CHOL 161 10/04/2023    LDLF 87 04/22/2022    LDLCALC 85 (L) 10/04/2023    HDL 43.2 10/04/2023     Urine Microalbumin  Lab Results   Component Value Date    MICROALBCREA 54.7 (H) 05/11/2021     Weight Management  Wt Readings from Last 3 Encounters:   11/13/24 136 kg (300 lb)   11/01/24 137 kg (301 lb)   04/22/24 132 kg (291 lb)      There is no height or weight on file to calculate BMI.     Assessment/Plan   Problem List Items Addressed This Visit       Type 2 diabetes mellitus without complications (Multi)     Patient's goal A1c is < 7%.  Is pt at goal? No, 8.0% (11/1/2024)  Patient's SMBGs are 90-160mg/dL, but limited  Rationale for plan: Mounjaro was increased by PCP. Patient experiencing some diarrhea. Will continue 7.5mg and follow up with side effects.    Medication Changes:  CONTINUE:  Glimepiride 4mg 2 times a day  Jardiance 25mg daily  Actos 45mg daily  Mounjaro to 7.5mg once weekly (Sunday). Patient has medication at home already.    Future Considerations:  Recommend lifestyle changes  Titrate Mounjaro as tolerated.    Diabetes Education  Rule of 15: eating ~15 g of carbs when BG less than 80 (half cup juice, 3-4 glucose tabs).  Recognize symptoms of high and low blood  sugar.   Eat a realistic healthy diet consisting of fruits, vegetables, fiber, protein food choices on a regular basis and be aware of portion/serving sizes. Reduce carbohydrate consumption and always consume with protein and fat. Avoid foods high in saturated/trans fat, high salt content, and sweets and beverages with added sugars.  Limit alcohol consumption; alcohol may affect your blood sugar and cause hypoglycemia.   Stay active and incorporate ~30 mins of exercise into your daily routine to manage your weight and increase the body's acceptance of insulin.    PATIENT GOALS   Fasting B - 130 mg/dL 2-HR Postprandial BG:   Less than 180 mg/dL A1c:   Less than 7.0 %      Mounjaro Education:   Counseled patient on MOA, expectations, side effects, duration of therapy, contraindications, administration, and monitoring parameters  Answered all patient questions and concerns  Provided detailed dosing and administration counseling to ensure proper technique.   Reviewed Mounjaro titration schedule, starting with 2.5 mg once weekly to a goal of 15 mg once weekly if tolerated  Counseled patient on the benefits of GLP-1ra glycemic control and weight loss  Reviewed storage requirements of Mounjaro when not in use, and when to administer the medication if a dose is missed.  Advised patient that they may experience improved satiety after meals and portion sizes of meals may be reduced as doses of Mounjaro increase.    Empagliflozin (Jardiance) Education:  Counseled patient on Empagliflozin (Jardiance) MOA, expectations, side effects, duration of therapy, administration, and monitoring parameters.  Reviewed the benefits of SGLT-2i therapy, such as glycemic control and kidney and CV protection.  Advised patient to practice proper  hygiene to reduce risk of UTIs or yeast infections.  Advised patient to maintain adequate fluid intake to remain hydrated while on SGLT2i therapy.  Answered all patient questions and  concerns.         Relevant Orders    Referral to Clinical Pharmacy       Clinical Pharmacist follow-up: 1/15/2025 at 2:00PM, Telehealth visit    Continue all meds under the continuation of care with the referring provider and clinical pharmacy team.    Please reach out to the Clinical Pharmacy Team if there are any further questions.     Verbal consent to manage patient's drug therapy was obtained from patient. They were informed they may decline to participate or withdraw from participation in pharmacy services at any time.    Brianna Joseph, PharmD  [phone]

## 2024-12-28 DIAGNOSIS — M54.16 LEFT LUMBAR RADICULOPATHY: ICD-10-CM

## 2024-12-28 RX ORDER — DICLOFENAC SODIUM 75 MG/1
75 TABLET, DELAYED RELEASE ORAL 2 TIMES DAILY PRN
Qty: 60 TABLET | Refills: 0 | Status: SHIPPED | OUTPATIENT
Start: 2024-12-28 | End: 2025-02-26

## 2025-01-02 ENCOUNTER — APPOINTMENT (OUTPATIENT)
Dept: PRIMARY CARE | Facility: CLINIC | Age: 55
End: 2025-01-02
Payer: COMMERCIAL

## 2025-01-15 ENCOUNTER — APPOINTMENT (OUTPATIENT)
Dept: PHARMACY | Facility: HOSPITAL | Age: 55
End: 2025-01-15
Payer: COMMERCIAL

## 2025-01-15 ENCOUNTER — PATIENT OUTREACH (OUTPATIENT)
Dept: PRIMARY CARE | Facility: CLINIC | Age: 55
End: 2025-01-15

## 2025-01-15 RX ORDER — BENZONATATE 100 MG/1
100 CAPSULE ORAL EVERY 8 HOURS PRN
COMMUNITY
Start: 2025-01-14 | End: 2025-01-24

## 2025-01-15 RX ORDER — DOXYCYCLINE 100 MG/1
100 CAPSULE ORAL 2 TIMES DAILY
COMMUNITY
Start: 2025-01-14 | End: 2025-01-19

## 2025-01-15 NOTE — PROGRESS NOTES
Discharge Facility:   [facility]   Discharge Diagnosis:  COVID-19  Pneumonia due to infectious organism left upper lobe and right base  Admission Date:  1/11/25  Discharge Date:   1/14/25     PCP Appointment Date:  1/21/24   Specialist Appointment Date: n/a   Hospital Encounter and Summary Linked: Yes    See discharge assessment below for further details    Medications  Medications reviewed with patient/caregiver?: Yes (1/15/2025 10:33 AM)  Is the patient having any side effects they believe may be caused by any medication additions or changes?: No (1/15/2025 10:33 AM)  Does the patient have all medications ordered at discharge?: Yes (1/15/2025 10:33 AM)  Prescription Comments: New scripts given at discharge :benzonatate 100 mg capsule doxycycline hyclate 100 mg capsule (1/15/2025 10:33 AM)  Is the patient taking all medications as directed (includes completed medication regime)?: Yes (1/15/2025 10:33 AM)  Care Management Interventions: Provided patient education (1/15/2025 10:33 AM)  Medication Comments: Pt denies problems obtaining or affording medication. No medication-related issues identified (1/15/2025 10:33 AM)    Appointments  Does the patient have a primary care provider?: Yes (1/15/2025 10:33 AM)  Care Management Interventions: Verified appointment date/time/provider (1/15/2025 10:33 AM)  Has the patient kept scheduled appointments due by today?: Yes (1/15/2025 10:33 AM)  Care Management Interventions: Advised patient to keep appointment (1/15/2025 10:33 AM)    Self Management  What is the home health agency?: DENIES NEED (1/15/2025 10:33 AM)  What Durable Medical Equipment (DME) was ordered?: N/A (1/15/2025 10:33 AM)    Patient Teaching  Does the patient have access to their discharge instructions?: Yes (1/15/2025 10:33 AM)  Care Management Interventions: Reviewed instructions with patient (1/15/2025 10:33 AM)  What is the patient's perception of their health status since discharge?:  Improving (1/15/2025 10:33 AM)  Is the patient/caregiver able to teach back the hierarchy of who to call/visit for symptoms/problems? PCP, Specialist, Home Health nurse, Urgent Care, ED, 911: Yes (1/15/2025 10:33 AM)  Patient/Caregiver Education Comments: Successful transition of care outreach with patient. Pt reports doing well at home since discharge. New meds reviewed. Pt denies CP and SOB. Patient denies any further discharge questions/needs at this time. Emphasized that Follow up is needed after discharge to review the hospital recommendations, assess your response to your treatment. .  Pt aware of my availability for non-emergent concerns. Contact info provided to patient (1/15/2025 10:33 AM)

## 2025-01-17 DIAGNOSIS — E11.9 TYPE 2 DIABETES MELLITUS WITHOUT COMPLICATION, WITHOUT LONG-TERM CURRENT USE OF INSULIN (MULTI): Primary | ICD-10-CM

## 2025-01-17 PROCEDURE — RXMED WILLOW AMBULATORY MEDICATION CHARGE

## 2025-01-17 RX ORDER — TIRZEPATIDE 7.5 MG/.5ML
7.5 INJECTION, SOLUTION SUBCUTANEOUS WEEKLY
Qty: 2 ML | Refills: 0 | Status: SHIPPED | OUTPATIENT
Start: 2025-01-17

## 2025-01-20 ENCOUNTER — PHARMACY VISIT (OUTPATIENT)
Dept: PHARMACY | Facility: CLINIC | Age: 55
End: 2025-01-20
Payer: MEDICARE

## 2025-01-21 ENCOUNTER — APPOINTMENT (OUTPATIENT)
Dept: PRIMARY CARE | Facility: CLINIC | Age: 55
End: 2025-01-21
Payer: COMMERCIAL

## 2025-01-21 ENCOUNTER — TELEPHONE (OUTPATIENT)
Dept: PRIMARY CARE | Facility: CLINIC | Age: 55
End: 2025-01-21

## 2025-01-21 ENCOUNTER — PHARMACY VISIT (OUTPATIENT)
Dept: PHARMACY | Facility: CLINIC | Age: 55
End: 2025-01-21

## 2025-01-21 DIAGNOSIS — U07.1 COVID-19: Primary | ICD-10-CM

## 2025-01-21 DIAGNOSIS — E11.42 TYPE 2 DIABETES MELLITUS WITH DIABETIC POLYNEUROPATHY, WITHOUT LONG-TERM CURRENT USE OF INSULIN: ICD-10-CM

## 2025-01-21 DIAGNOSIS — K21.9 GASTROESOPHAGEAL REFLUX DISEASE WITHOUT ESOPHAGITIS: Chronic | ICD-10-CM

## 2025-01-21 DIAGNOSIS — E11.42 TYPE 2 DIABETES MELLITUS WITH DIABETIC POLYNEUROPATHY, WITH LONG-TERM CURRENT USE OF INSULIN: ICD-10-CM

## 2025-01-21 DIAGNOSIS — Z79.4 TYPE 2 DIABETES MELLITUS WITH DIABETIC POLYNEUROPATHY, WITH LONG-TERM CURRENT USE OF INSULIN: ICD-10-CM

## 2025-01-21 DIAGNOSIS — E66.01 MORBID OBESITY (MULTI): ICD-10-CM

## 2025-01-21 RX ORDER — BROMPHENIRAMINE MALEATE, PSEUDOEPHEDRINE HYDROCHLORIDE, AND DEXTROMETHORPHAN HYDROBROMIDE 2; 30; 10 MG/5ML; MG/5ML; MG/5ML
5 SYRUP ORAL 4 TIMES DAILY PRN
Qty: 120 ML | Refills: 0 | Status: SHIPPED | OUTPATIENT
Start: 2025-01-21 | End: 2025-01-31

## 2025-01-21 RX ORDER — AZITHROMYCIN 250 MG/1
TABLET, FILM COATED ORAL
Qty: 6 TABLET | Refills: 0 | Status: SHIPPED | OUTPATIENT
Start: 2025-01-21 | End: 2025-01-25

## 2025-01-21 ASSESSMENT — ENCOUNTER SYMPTOMS
HEMATURIA: 0
SINUS PAIN: 0
CONSTIPATION: 0
MYALGIAS: 0
CONFUSION: 0
FLANK PAIN: 0
ABDOMINAL DISTENTION: 0
SLEEP DISTURBANCE: 0
CONSTITUTIONAL NEGATIVE: 1
DYSURIA: 0
RECTAL PAIN: 0
HEADACHES: 0
AGITATION: 0
FATIGUE: 0
DYSPHORIC MOOD: 0
CHEST TIGHTNESS: 0
FEVER: 0
EYE PAIN: 0
NERVOUS/ANXIOUS: 0
PHOTOPHOBIA: 0
APPETITE CHANGE: 0
PALPITATIONS: 0
DIZZINESS: 0
POLYPHAGIA: 0
RHINORRHEA: 0
NECK STIFFNESS: 0
POLYDIPSIA: 0
SINUS PRESSURE: 0
ADENOPATHY: 0
COLOR CHANGE: 0
ARTHRALGIAS: 0
ACTIVITY CHANGE: 0
TROUBLE SWALLOWING: 0
SEIZURES: 0
SORE THROAT: 0
ABDOMINAL PAIN: 0
DECREASED CONCENTRATION: 0
STRIDOR: 0
BLOOD IN STOOL: 0
DIARRHEA: 0
SPEECH DIFFICULTY: 0

## 2025-01-21 NOTE — TELEPHONE ENCOUNTER
"Pt calling upset that her VV was scheduled at 10:45am today 1/21 with CJ, and after being checked in was told that he would not connect with her until sometime between 12-2pm this afternoon.  Pt states she was not informed of this while scheduling appt and \"Dr. Nj has never done his victuals this way\" after being reassured he has always done morning VV's between 12-2pm and afternoon VV's between 5-6pm.  Pt then proceeds to question when a medical assistant will connect with her, as she does not have time to wait around all day. She then states this is ridiculous and her time/life does not matter.  Informed pt she could see Latisha virtually if she did not want to wait, or MA will connect with her shortly.  "

## 2025-01-21 NOTE — PATIENT INSTRUCTIONS
Follow up in 5 days     Continue current medications and therapy for chronic medical conditions.    Patient was advised importance of proper diet/nutrition in addition adequate hydration. Patient was encouraged moderate exercise program to include 30 minutes daily for 5 days of the week or 150 minutes weekly. Patient will follow-up with us as scheduled.    Start zpak    Start bromphed-DM    RTW 01/22    Obtain urine ACR

## 2025-01-21 NOTE — TELEPHONE ENCOUNTER
Completed patients VV rooming. Reassured patient she will be called between 12-2 pm. Patient okay with this now.   Advised CJ patient is upset about waiting.

## 2025-01-21 NOTE — PROGRESS NOTES
Subjective   Patient ID: Yane Lang is a 54 y.o. female who presents for Covid-19 Home Monitoring Phone Call.    Consent obtained by Yane Lang for audio communication. Total time for this audio communication visit is 12 minutes.     HPI   Patient following up on pneumonia and COVID-19 01/11/2025  Completed doxycycline 100 mg twice daily yesterday 01/20/2025  Still experiencing cough. States yesterday it finally became more productive and she feels chest congestion breaking up.     Review of Systems   Constitutional: Negative.  Negative for activity change, appetite change, fatigue and fever.   HENT:  Positive for congestion. Negative for dental problem, ear discharge, ear pain, mouth sores, rhinorrhea, sinus pressure, sinus pain, sore throat, tinnitus and trouble swallowing.    Eyes:  Negative for photophobia, pain and visual disturbance.   Respiratory:  Positive for cough and shortness of breath. Negative for chest tightness and stridor.    Cardiovascular:  Negative for chest pain and palpitations.   Gastrointestinal:  Negative for abdominal distention, abdominal pain, blood in stool, constipation, diarrhea and rectal pain.   Endocrine: Negative for cold intolerance, heat intolerance, polydipsia, polyphagia and polyuria.   Genitourinary:  Negative for dysuria, flank pain, hematuria and urgency.   Musculoskeletal:  Negative for arthralgias, gait problem, myalgias and neck stiffness.   Skin:  Negative for color change and rash.   Allergic/Immunologic: Negative for environmental allergies and food allergies.   Neurological:  Negative for dizziness, seizures, syncope, speech difficulty and headaches.   Hematological:  Negative for adenopathy.   Psychiatric/Behavioral:  Negative for agitation, confusion, decreased concentration, dysphoric mood and sleep disturbance. The patient is not nervous/anxious.        Objective   LMP  (LMP Unknown)     Physical Exam  Constitutional:       Appearance: She is obese.    Neurological:      Mental Status: She is alert and oriented to person, place, and time.   Psychiatric:         Mood and Affect: Mood normal.         Behavior: Behavior normal.         Thought Content: Thought content normal.       Constitutional: Well developed, well nourished, alert and in no acute distress   Psychiatric: Mood calm and affect normal    Telephone Visit - Audio Communication Only     Assessment/Plan   Problem List Items Addressed This Visit             ICD-10-CM    GERD (gastroesophageal reflux disease) (Chronic) K21.9    Morbid obesity (Multi) E66.01    Type 2 diabetes mellitus without complications (Multi) E11.9    Type 2 diabetes mellitus with diabetic polyneuropathy, with long-term current use of insulin E11.42, Z79.4     Monitored/uncontrolled          Other Visit Diagnoses         Codes    COVID-19    -  Primary U07.1    Relevant Medications    brompheniramine-pseudoeph-DM 2-30-10 mg/5 mL syrup    BMI 50.0-59.9, adult (Multi)     Z68.43          Scribe Attestation  By signing my name below, I, Leticia Peterson MA , Scribe   attest that this documentation has been prepared under the direction and in the presence of Rj Nj DO.    Provider Attestation - Scribe documentation    All medical record entries made by the Scribe were at my direction and personally dictated by me. I have reviewed the chart and agree that the record accurately reflects my personal performance of the history, physical exam, discussion and plan.

## 2025-01-22 ENCOUNTER — PATIENT OUTREACH (OUTPATIENT)
Dept: PRIMARY CARE | Facility: CLINIC | Age: 55
End: 2025-01-22

## 2025-01-22 ENCOUNTER — APPOINTMENT (OUTPATIENT)
Dept: PHARMACY | Facility: HOSPITAL | Age: 55
End: 2025-01-22
Payer: COMMERCIAL

## 2025-01-26 PROBLEM — E11.42 TYPE 2 DIABETES MELLITUS WITH DIABETIC POLYNEUROPATHY, WITH LONG-TERM CURRENT USE OF INSULIN: Status: ACTIVE | Noted: 2025-01-26

## 2025-01-26 PROBLEM — Z79.4 TYPE 2 DIABETES MELLITUS WITH DIABETIC POLYNEUROPATHY, WITH LONG-TERM CURRENT USE OF INSULIN: Status: ACTIVE | Noted: 2025-01-26

## 2025-01-26 ASSESSMENT — ENCOUNTER SYMPTOMS
COUGH: 1
SHORTNESS OF BREATH: 1

## 2025-01-28 ENCOUNTER — TELEPHONE (OUTPATIENT)
Dept: PRIMARY CARE | Facility: CLINIC | Age: 55
End: 2025-01-28
Payer: COMMERCIAL

## 2025-01-28 DIAGNOSIS — J01.00 SUBACUTE MAXILLARY SINUSITIS: Primary | ICD-10-CM

## 2025-01-28 RX ORDER — SULFAMETHOXAZOLE AND TRIMETHOPRIM 800; 160 MG/1; MG/1
1 TABLET ORAL 2 TIMES DAILY
Qty: 20 TABLET | Refills: 0 | Status: SHIPPED | OUTPATIENT
Start: 2025-01-28 | End: 2025-02-07

## 2025-01-28 NOTE — TELEPHONE ENCOUNTER
Pt said still isnt feeling well and would like dr to call in another antibiotic for her.     Please advise

## 2025-02-07 ENCOUNTER — PATIENT OUTREACH (OUTPATIENT)
Dept: PRIMARY CARE | Facility: CLINIC | Age: 55
End: 2025-02-07
Payer: COMMERCIAL

## 2025-02-07 NOTE — PROGRESS NOTES
Unable to reach patient for discharge follow up call.   LVM with call back number for patient to call if needed   If no voicemail available call attempts x 2 were made to contact the patient to assist with any questions or concerns patient may have.

## 2025-02-11 DIAGNOSIS — E11.9 TYPE 2 DIABETES MELLITUS WITHOUT COMPLICATION, WITHOUT LONG-TERM CURRENT USE OF INSULIN (MULTI): ICD-10-CM

## 2025-02-11 RX ORDER — TIRZEPATIDE 7.5 MG/.5ML
7.5 INJECTION, SOLUTION SUBCUTANEOUS WEEKLY
Qty: 2 ML | Refills: 0 | OUTPATIENT
Start: 2025-02-11

## 2025-02-18 ENCOUNTER — APPOINTMENT (OUTPATIENT)
Dept: PHARMACY | Facility: HOSPITAL | Age: 55
End: 2025-02-18
Payer: COMMERCIAL

## 2025-02-18 DIAGNOSIS — E11.9 TYPE 2 DIABETES MELLITUS WITHOUT COMPLICATION, WITHOUT LONG-TERM CURRENT USE OF INSULIN (MULTI): ICD-10-CM

## 2025-02-18 NOTE — PROGRESS NOTES
Clinical Pharmacy Appointment    Patient ID: Yane Lang is a 54 y.o. female who presents for Diabetes.    Pt is here for Follow Up appointment.     Referring Provider: Rj Nj DO  PCP: Rj Nj DO   Last visit with PCP: 1/21/2025  Next visit with PCP: not scheduled; encouraged     Patient Assistance for Mounjaro and Jardiance approved through 11/11/2025. Will have to be renewed prior to that date to prevent lapse in coverage. Medication(s) will be received at no cost to patient from [facility].       Subjective     HPI    DIABETES MELLITUS TYPE 2:    Diagnosed with diabetes:  8 years. Known diabetic complications: peripheral neuropathy.  Does patient follow with Endocrinology: no  Last optometry exam: none  Most recent visit in Podiatry: 2021-- patient denies sores or cuts on feet today      Current diabetic medications include:  Glimepiride 4mg twice a day  Jardiance 25mg daily  Actos 45mg daily  Mounjaro 7.5mg once weekly (Sunday)     Adverse Effects: diarrhea    Glucose Readings:  Glucometer/CGM Type: OneTouch  Current home BG readings: 90-160mg/dL    Any episodes of hypoglycemia? No, none reported .  Did patient treat episode of hypoglycemia appropriately? N/A  Does the patient have a prescription for ready-to-use Glucagon? N/a  Does pt have proteinuria? N/A, UACR of 54 5/2021    Secondary Prevention:  Statin? Yes- Lipitor 10mg take 1 tab every day  ACE-I/ARB? Yes- Lisinopril 10mg take 1 tab every day  Aspirin? No    Pertinent PMH Review:  PMH of Pancreatitis: No  PMH of Retinopathy: No  PMH of Urinary Tract Infections: No  PMH of MTC: No    Drug Interactions  No relevant drug interactions were noted.    Medication System Management  [facility]'s preferred [facility] [place]  Adherence/Organization: was getting medications through mail order, but states she has an outstanding balance there and cannot get any meds sent out to her; prefers [facility] in [place]  now  Affordability/Accessibility:  Patient Assistance      Objective   Allergies   Allergen Reactions    Cephalexin Itching     GI UPSET    Iodinated Contrast Media Unknown    Meperidine Unknown    Morphine Itching    Other Swelling    Oxycodone Hcl-Oxycodone-Asa Unknown    Oxycodone-Acetaminophen Unknown    Polymyxin B Other    Propoxyphene N-Acetaminophen Unknown     Social History     Social History Narrative    Not on file      Medication Review  Current Outpatient Medications   Medication Instructions    acetaminophen (Tylenol 8 HOUR) 650 mg ER tablet Take 1 tablet by mouth every 8 hours if needed for mild pain (1 - 3). Do not crush, chew, or split.    atorvastatin (LIPITOR) 10 mg, oral, Daily    Banophen 25 mg capsule TAKE 2 CAPSULES BY MOUTH EVERY 4 HOURS    blood sugar diagnostic (OneTouch Verio test strips) strip USE 1 STRIP 3 TIMES DAILY.    cholecalciferol (VITAMIN D-3) 50,000 Units, oral, Once Weekly    diclofenac (VOLTAREN) 75 mg, oral, 2 times daily PRN, Do not crush, chew, or split.    dicyclomine (Bentyl) 10 mg capsule TAKE 1 CAPSULE BY MOUTH FOUR TIMES DAILY    furosemide (LASIX) 20 mg, oral, Daily    gabapentin (NEURONTIN) 400 mg, oral, 4 times daily    glimepiride (AMARYL) 4 mg, oral, 2 times daily    ibuprofen 600 mg tablet TAKE 1 TABLET BY MOUTH THREE TIMES A DAY AS NEEDED FOR PAIN    Jardiance 25 mg, oral, Daily    lancets misc 3 times daily    lidocaine (Lidoderm) 5 % patch APPLY ONE PATCH TO THE AFFECTED AREA(S) DAILY. 12 HOURS on, 12 HOURS off.    lisinopril 10 mg, oral, Daily, for blood pressure    methocarbamol (Robaxin) 500 mg tablet TAKE 1 TO 2 TABLETS BY MOUTH NIGHTLY    Mounjaro 10 mg, subcutaneous, Weekly    nabumetone (RELAFEN) 500 mg, oral, 2 times daily    omeprazole (PRILOSEC) 20 mg, oral, Daily before breakfast    ondansetron ODT (ZOFRAN-ODT) 4 mg, oral, Every 6 hours PRN, DISSOLVE  ON THE TONGUE    pioglitazone (ACTOS) 45 mg, oral, Daily    tiZANidine (ZANAFLEX) 4 mg, oral, 2  times daily    traZODone (DESYREL) 50 mg, oral, Nightly      Vitals  BP Readings from Last 2 Encounters:   11/01/24 134/72   04/22/24 118/80     BMI Readings from Last 1 Encounters:   11/13/24 53.14 kg/m²      Labs  A1C  Lab Results   Component Value Date    HGBA1C 8.0 (A) 11/01/2024    HGBA1C 8.9 (H) 04/05/2024    HGBA1C 9.6 (A) 07/13/2023     BMP  Lab Results   Component Value Date    CALCIUM 9.4 04/05/2024     (L) 04/05/2024    K 4.6 04/05/2024    CO2 24 04/05/2024    CL 99 04/05/2024    BUN 18 04/05/2024    CREATININE 0.73 04/05/2024    EGFR >90 04/05/2024     LFTs  Lab Results   Component Value Date    ALT 27 04/05/2024    AST 21 04/05/2024    ALKPHOS 103 04/05/2024    BILITOT 0.6 04/05/2024     FLP  Lab Results   Component Value Date    TRIG 166 (H) 10/04/2023    CHOL 161 10/04/2023    LDLF 87 04/22/2022    LDLCALC 85 (L) 10/04/2023    HDL 43.2 10/04/2023     Urine Microalbumin  Lab Results   Component Value Date    MICROALBCREA 54.7 (H) 05/11/2021     Weight Management  Wt Readings from Last 3 Encounters:   11/13/24 136 kg (300 lb)   11/01/24 137 kg (301 lb)   04/22/24 132 kg (291 lb)      There is no height or weight on file to calculate BMI.     Assessment/Plan   Problem List Items Addressed This Visit       Type 2 diabetes mellitus without complications (Multi)     Patient's goal A1c is < 7%.  Is pt at goal? No, 8.0% (11/1/2024)  Patient's SMBGs are 90-160mg/dL, but limited  Rationale for plan: Patient tolerating Mounjaro well. Will increase dose to help with FBG.    Medication Changes:  CONTINUE:  Glimepiride 4mg 2 times a day  Jardiance 25mg daily  Actos 45mg daily  INCREASE  Mounjaro to 10mg once weekly. Will send medication to [facility] for mail order.    Future Considerations:  Recommend lifestyle changes  Titrate Mounjaro as tolerated.    Diabetes Education  Rule of 15: eating ~15 g of carbs when BG less than 80 (half cup juice, 3-4 glucose tabs).  Recognize symptoms of high and low  blood sugar.   Eat a realistic healthy diet consisting of fruits, vegetables, fiber, protein food choices on a regular basis and be aware of portion/serving sizes. Reduce carbohydrate consumption and always consume with protein and fat. Avoid foods high in saturated/trans fat, high salt content, and sweets and beverages with added sugars.  Limit alcohol consumption; alcohol may affect your blood sugar and cause hypoglycemia.   Stay active and incorporate ~30 mins of exercise into your daily routine to manage your weight and increase the body's acceptance of insulin.    PATIENT GOALS   Fasting B - 130 mg/dL 2-HR Postprandial BG:   Less than 180 mg/dL A1c:   Less than 7.0 %      Mounjaro Education:   Counseled patient on MOA, expectations, side effects, duration of therapy, contraindications, administration, and monitoring parameters  Answered all patient questions and concerns  Provided detailed dosing and administration counseling to ensure proper technique.   Reviewed Mounjaro titration schedule, starting with 2.5 mg once weekly to a goal of 15 mg once weekly if tolerated  Counseled patient on the benefits of GLP-1ra glycemic control and weight loss  Reviewed storage requirements of Mounjaro when not in use, and when to administer the medication if a dose is missed.  Advised patient that they may experience improved satiety after meals and portion sizes of meals may be reduced as doses of Mounjaro increase.    Empagliflozin (Jardiance) Education:  Counseled patient on Empagliflozin (Jardiance) MOA, expectations, side effects, duration of therapy, administration, and monitoring parameters.  Reviewed the benefits of SGLT-2i therapy, such as glycemic control and kidney and CV protection.  Advised patient to practice proper  hygiene to reduce risk of UTIs or yeast infections.  Advised patient to maintain adequate fluid intake to remain hydrated while on SGLT2i therapy.  Answered all patient questions and  concerns.         Relevant Medications    tirzepatide (Mounjaro) 10 mg/0.5 mL pen injector    Other Relevant Orders    Referral to Clinical Pharmacy     Clinical Pharmacist follow-up: 3/19/2025 at 2:20PM, Telehealth visit    Continue all meds under the continuation of care with the referring provider and clinical pharmacy team.    Please reach out to the Clinical Pharmacy Team if there are any further questions.     Verbal consent to manage patient's drug therapy was obtained from patient. They were informed they may decline to participate or withdraw from participation in pharmacy services at any time.    Brianna Joseph, PharmD  [phone]

## 2025-02-21 PROCEDURE — RXMED WILLOW AMBULATORY MEDICATION CHARGE

## 2025-02-21 RX ORDER — TIRZEPATIDE 10 MG/.5ML
10 INJECTION, SOLUTION SUBCUTANEOUS WEEKLY
Qty: 2 ML | Refills: 0 | Status: SHIPPED | OUTPATIENT
Start: 2025-02-21

## 2025-02-21 NOTE — ASSESSMENT & PLAN NOTE
Patient's goal A1c is < 7%.  Is pt at goal? No, 8.0% (2024)  Patient's SMBGs are 90-160mg/dL, but limited  Rationale for plan: Patient tolerating Mounjaro well. Will increase dose to help with FBG.    Medication Changes:  CONTINUE:  Glimepiride 4mg 2 times a day  Jardiance 25mg daily  Actos 45mg daily  INCREASE  Mounjaro to 10mg once weekly. Will send medication to [facility] for mail order.    Future Considerations:  Recommend lifestyle changes  Titrate Mounjaro as tolerated.    Diabetes Education  Rule of 15: eating ~15 g of carbs when BG less than 80 (half cup juice, 3-4 glucose tabs).  Recognize symptoms of high and low blood sugar.   Eat a realistic healthy diet consisting of fruits, vegetables, fiber, protein food choices on a regular basis and be aware of portion/serving sizes. Reduce carbohydrate consumption and always consume with protein and fat. Avoid foods high in saturated/trans fat, high salt content, and sweets and beverages with added sugars.  Limit alcohol consumption; alcohol may affect your blood sugar and cause hypoglycemia.   Stay active and incorporate ~30 mins of exercise into your daily routine to manage your weight and increase the body's acceptance of insulin.    PATIENT GOALS   Fasting B - 130 mg/dL 2-HR Postprandial BG:   Less than 180 mg/dL A1c:   Less than 7.0 %      Mounjaro Education:   Counseled patient on MOA, expectations, side effects, duration of therapy, contraindications, administration, and monitoring parameters  Answered all patient questions and concerns  Provided detailed dosing and administration counseling to ensure proper technique.   Reviewed Mounjaro titration schedule, starting with 2.5 mg once weekly to a goal of 15 mg once weekly if tolerated  Counseled patient on the benefits of GLP-1ra glycemic control and weight loss  Reviewed storage requirements of Mounjaro when not in use, and when to administer the medication if a dose is  missed.  Advised patient that they may experience improved satiety after meals and portion sizes of meals may be reduced as doses of Mounjaro increase.    Empagliflozin (Jardiance) Education:  Counseled patient on Empagliflozin (Jardiance) MOA, expectations, side effects, duration of therapy, administration, and monitoring parameters.  Reviewed the benefits of SGLT-2i therapy, such as glycemic control and kidney and CV protection.  Advised patient to practice proper  hygiene to reduce risk of UTIs or yeast infections.  Advised patient to maintain adequate fluid intake to remain hydrated while on SGLT2i therapy.  Answered all patient questions and concerns.

## 2025-02-24 ENCOUNTER — TRANSCRIBE ORDERS (OUTPATIENT)
Dept: WOMENS IMAGING | Age: 55
End: 2025-02-24

## 2025-02-24 ENCOUNTER — PHARMACY VISIT (OUTPATIENT)
Dept: PHARMACY | Facility: CLINIC | Age: 55
End: 2025-02-24
Payer: MEDICARE

## 2025-02-24 DIAGNOSIS — Z12.31 SCREENING MAMMOGRAM FOR BREAST CANCER: Primary | ICD-10-CM

## 2025-02-25 DIAGNOSIS — M54.16 LEFT LUMBAR RADICULOPATHY: ICD-10-CM

## 2025-02-25 RX ORDER — TIZANIDINE HYDROCHLORIDE 4 MG/1
4 CAPSULE, GELATIN COATED ORAL 2 TIMES DAILY
Qty: 180 CAPSULE | Refills: 0 | Status: SHIPPED | OUTPATIENT
Start: 2025-02-25 | End: 2026-02-25

## 2025-03-10 DIAGNOSIS — M54.16 LEFT LUMBAR RADICULOPATHY: ICD-10-CM

## 2025-03-10 NOTE — TELEPHONE ENCOUNTER
Recent Visits  Date Type Provider Dept   11/01/24 Office Visit Rj Nj DO Do [facility]   04/22/24 Office Visit Rj Nj, DO Do [facility]   Showing recent visits within past 365 days and meeting all other requirements  Future Appointments  No visits were found meeting these conditions.  Showing future appointments within next 90 days and meeting all other requirements

## 2025-03-11 RX ORDER — DICLOFENAC SODIUM 75 MG/1
75 TABLET, DELAYED RELEASE ORAL 2 TIMES DAILY PRN
Qty: 60 TABLET | Refills: 0 | Status: SHIPPED | OUTPATIENT
Start: 2025-03-11 | End: 2025-05-10

## 2025-03-17 DIAGNOSIS — E11.9 TYPE 2 DIABETES MELLITUS WITHOUT COMPLICATION, WITHOUT LONG-TERM CURRENT USE OF INSULIN (MULTI): ICD-10-CM

## 2025-03-17 RX ORDER — TIRZEPATIDE 10 MG/.5ML
10 INJECTION, SOLUTION SUBCUTANEOUS WEEKLY
Qty: 2 ML | Refills: 0 | OUTPATIENT
Start: 2025-03-17

## 2025-03-19 ENCOUNTER — APPOINTMENT (OUTPATIENT)
Dept: PHARMACY | Facility: HOSPITAL | Age: 55
End: 2025-03-19
Payer: COMMERCIAL

## 2025-03-19 DIAGNOSIS — E11.9 TYPE 2 DIABETES MELLITUS WITHOUT COMPLICATION, WITHOUT LONG-TERM CURRENT USE OF INSULIN (MULTI): ICD-10-CM

## 2025-03-19 PROCEDURE — RXMED WILLOW AMBULATORY MEDICATION CHARGE

## 2025-03-19 RX ORDER — TIRZEPATIDE 12.5 MG/.5ML
12.5 INJECTION, SOLUTION SUBCUTANEOUS WEEKLY
Qty: 2 ML | Refills: 0 | Status: SHIPPED | OUTPATIENT
Start: 2025-03-19

## 2025-03-19 NOTE — ASSESSMENT & PLAN NOTE
Patient's goal A1c is < 7%.  Is pt at goal? No, 8.0% (2024)  Patient's SMBGs are 90-160mg/dL, but limited  Rationale for plan: Patient tolerating Mounjaro well. Will increase dose to help with FBG.    Medication Changes:  CONTINUE:  Glimepiride 4mg 2 times a day  Jardiance 25mg daily  Actos 45mg daily  INCREASE  Mounjaro to 12.5mg once weekly. Will send medication to [facility] for mail order.    Future Considerations:  Recommend lifestyle changes  Titrate Mounjaro as tolerated.    Diabetes Education  Rule of 15: eating ~15 g of carbs when BG less than 80 (half cup juice, 3-4 glucose tabs).  Recognize symptoms of high and low blood sugar.   Eat a realistic healthy diet consisting of fruits, vegetables, fiber, protein food choices on a regular basis and be aware of portion/serving sizes. Reduce carbohydrate consumption and always consume with protein and fat. Avoid foods high in saturated/trans fat, high salt content, and sweets and beverages with added sugars.  Limit alcohol consumption; alcohol may affect your blood sugar and cause hypoglycemia.   Stay active and incorporate ~30 mins of exercise into your daily routine to manage your weight and increase the body's acceptance of insulin.    PATIENT GOALS   Fasting B - 130 mg/dL 2-HR Postprandial BG:   Less than 180 mg/dL A1c:   Less than 7.0 %      Mounjaro Education:   Counseled patient on MOA, expectations, side effects, duration of therapy, contraindications, administration, and monitoring parameters  Answered all patient questions and concerns  Provided detailed dosing and administration counseling to ensure proper technique.   Reviewed Mounjaro titration schedule, starting with 2.5 mg once weekly to a goal of 15 mg once weekly if tolerated  Counseled patient on the benefits of GLP-1ra glycemic control and weight loss  Reviewed storage requirements of Mounjaro when not in use, and when to administer the medication if a dose is  missed.  Advised patient that they may experience improved satiety after meals and portion sizes of meals may be reduced as doses of Mounjaro increase.    Empagliflozin (Jardiance) Education:  Counseled patient on Empagliflozin (Jardiance) MOA, expectations, side effects, duration of therapy, administration, and monitoring parameters.  Reviewed the benefits of SGLT-2i therapy, such as glycemic control and kidney and CV protection.  Advised patient to practice proper  hygiene to reduce risk of UTIs or yeast infections.  Advised patient to maintain adequate fluid intake to remain hydrated while on SGLT2i therapy.  Answered all patient questions and concerns.

## 2025-03-20 ENCOUNTER — PHARMACY VISIT (OUTPATIENT)
Dept: PHARMACY | Facility: CLINIC | Age: 55
End: 2025-03-20
Payer: MEDICARE

## 2025-03-22 ENCOUNTER — PHARMACY VISIT (OUTPATIENT)
Dept: PHARMACY | Facility: CLINIC | Age: 55
End: 2025-03-22

## 2025-04-12 DIAGNOSIS — E11.9 TYPE 2 DIABETES MELLITUS WITHOUT COMPLICATION, WITHOUT LONG-TERM CURRENT USE OF INSULIN: ICD-10-CM

## 2025-04-14 RX ORDER — TIRZEPATIDE 12.5 MG/.5ML
12.5 INJECTION, SOLUTION SUBCUTANEOUS WEEKLY
Qty: 2 ML | Refills: 0 | OUTPATIENT
Start: 2025-04-14

## 2025-04-16 ENCOUNTER — APPOINTMENT (OUTPATIENT)
Dept: PHARMACY | Facility: HOSPITAL | Age: 55
End: 2025-04-16
Payer: COMMERCIAL

## 2025-04-16 DIAGNOSIS — E11.9 TYPE 2 DIABETES MELLITUS WITHOUT COMPLICATION, WITHOUT LONG-TERM CURRENT USE OF INSULIN: ICD-10-CM

## 2025-04-16 DIAGNOSIS — M54.16 LEFT LUMBAR RADICULOPATHY: ICD-10-CM

## 2025-04-16 DIAGNOSIS — R60.0 EDEMA OF EXTREMITIES: ICD-10-CM

## 2025-04-16 RX ORDER — DICLOFENAC SODIUM 75 MG/1
75 TABLET, DELAYED RELEASE ORAL 2 TIMES DAILY PRN
Qty: 60 TABLET | Refills: 0 | Status: SHIPPED | OUTPATIENT
Start: 2025-04-16 | End: 2025-06-15

## 2025-04-16 RX ORDER — NABUMETONE 500 MG/1
500 TABLET, FILM COATED ORAL 2 TIMES DAILY
Qty: 60 TABLET | Refills: 0 | Status: SHIPPED | OUTPATIENT
Start: 2025-04-16

## 2025-04-16 NOTE — TELEPHONE ENCOUNTER
Recent Visits  Date Type Provider Dept   11/01/24 Office Visit Rj Nj DO [facility]   Showing recent visits within past 180 days and meeting all other requirements  Future Appointments  No visits were found meeting these conditions.  Showing future appointments within next 90 days and meeting all other requirements

## 2025-04-16 NOTE — PROGRESS NOTES
Clinical Pharmacy Appointment    Patient ID: Yane Lang is a 54 y.o. female who presents for Diabetes.    Pt is here for Follow Up appointment.     Referring Provider: Rj Nj DO  PCP: Rj Nj DO   Last visit with PCP: 1/21/2025  Next visit with PCP: not scheduled; encouraged     Patient Assistance for Mounjaro and Jardiance approved through 11/11/2025. Will have to be renewed prior to that date to prevent lapse in coverage. Medication(s) will be received at no cost to patient from [facility].       Subjective     HPI    DIABETES MELLITUS TYPE 2:    Diagnosed with diabetes:  8 years. Known diabetic complications: peripheral neuropathy.  Does patient follow with Endocrinology: no  Last optometry exam: none  Most recent visit in Podiatry: 2021-- patient denies sores or cuts on feet today      Current diabetic medications include:  Glimepiride 4mg twice a day  Jardiance 25mg daily  Actos 45mg daily  Mounjaro 12.5mg once weekly (Sunday)     Adverse Effects: diarrhea    Glucose Readings:  Glucometer/CGM Type: OneTouch  Current home BG readings: 90-160mg/dL    Any episodes of hypoglycemia? No, none reported .  Did patient treat episode of hypoglycemia appropriately? N/A  Does the patient have a prescription for ready-to-use Glucagon? N/a  Does pt have proteinuria? N/A, UACR of 54 5/2021    Secondary Prevention:  Statin? Yes- Lipitor 10mg take 1 tab every day  ACE-I/ARB? Yes- Lisinopril 10mg take 1 tab every day  Aspirin? No    Pertinent PMH Review:  PMH of Pancreatitis: No  PMH of Retinopathy: No  PMH of Urinary Tract Infections: No  PMH of MTC: No    Drug Interactions  No relevant drug interactions were noted.    Medication System Management  [facility] [place]  Adherence/Organization: was getting medications through mail order, but states she has an outstanding balance there and cannot get any meds sent out to her; prefers [facility] in [place]  now  Affordability/Accessibility:  Patient Assistance      Objective   Allergies   Allergen Reactions    Cephalexin Itching     GI UPSET    Iodinated Contrast Media Unknown    Meperidine Unknown    Morphine Itching    Other Swelling    Oxycodone Hcl-Oxycodone-Asa Unknown    Oxycodone-Acetaminophen Unknown    Polymyxin B Other    Propoxyphene N-Acetaminophen Unknown     Social History     Social History Narrative    Not on file      Medication Review  Current Outpatient Medications   Medication Instructions    acetaminophen (Tylenol 8 HOUR) 650 mg ER tablet Take 1 tablet by mouth every 8 hours if needed for mild pain (1 - 3). Do not crush, chew, or split.    atorvastatin (LIPITOR) 10 mg, oral, Daily    Banophen 25 mg capsule TAKE 2 CAPSULES BY MOUTH EVERY 4 HOURS    blood sugar diagnostic (OneTouch Verio test strips) strip USE 1 STRIP 3 TIMES DAILY.    cholecalciferol (VITAMIN D-3) 50,000 Units, oral, Once Weekly    diclofenac (VOLTAREN) 75 mg, oral, 2 times daily PRN, Do not crush, chew, or split.    dicyclomine (Bentyl) 10 mg capsule TAKE 1 CAPSULE BY MOUTH FOUR TIMES DAILY    furosemide (LASIX) 20 mg, oral, Daily    gabapentin (NEURONTIN) 400 mg, oral, 4 times daily    glimepiride (AMARYL) 4 mg, oral, 2 times daily    ibuprofen 600 mg tablet TAKE 1 TABLET BY MOUTH THREE TIMES A DAY AS NEEDED FOR PAIN    Jardiance 25 mg, oral, Daily    lancets misc 3 times daily    lidocaine (Lidoderm) 5 % patch APPLY ONE PATCH TO THE AFFECTED AREA(S) DAILY. 12 HOURS on, 12 HOURS off.    lisinopril 10 mg, oral, Daily, for blood pressure    methocarbamol (Robaxin) 500 mg tablet TAKE 1 TO 2 TABLETS BY MOUTH NIGHTLY    Mounjaro 12.5 mg, subcutaneous, Weekly    nabumetone (RELAFEN) 500 mg, oral, 2 times daily    omeprazole (PRILOSEC) 20 mg, oral, Daily before breakfast    ondansetron ODT (ZOFRAN-ODT) 4 mg, oral, Every 6 hours PRN, DISSOLVE  ON THE TONGUE    pioglitazone (ACTOS) 45 mg, oral, Daily    tiZANidine (ZANAFLEX) 4 mg, oral,  2 times daily    traZODone (DESYREL) 50 mg, oral, Nightly      Vitals  BP Readings from Last 2 Encounters:   11/01/24 134/72   04/22/24 118/80     BMI Readings from Last 1 Encounters:   11/13/24 53.14 kg/m²      Labs  A1C  Lab Results   Component Value Date    HGBA1C 8.0 (A) 11/01/2024    HGBA1C 8.9 (H) 04/05/2024    HGBA1C 9.6 (A) 07/13/2023     BMP  Lab Results   Component Value Date    CALCIUM 9.4 04/05/2024     (L) 04/05/2024    K 4.6 04/05/2024    CO2 24 04/05/2024    CL 99 04/05/2024    BUN 18 04/05/2024    CREATININE 0.73 04/05/2024    EGFR >90 04/05/2024     LFTs  Lab Results   Component Value Date    ALT 27 04/05/2024    AST 21 04/05/2024    ALKPHOS 103 04/05/2024    BILITOT 0.6 04/05/2024     FLP  Lab Results   Component Value Date    TRIG 166 (H) 10/04/2023    CHOL 161 10/04/2023    LDLF 87 04/22/2022    LDLCALC 85 (L) 10/04/2023    HDL 43.2 10/04/2023     Urine Microalbumin  Lab Results   Component Value Date    MICROALBCREA 54.7 (H) 05/11/2021     Weight Management  Wt Readings from Last 3 Encounters:   11/13/24 136 kg (300 lb)   11/01/24 137 kg (301 lb)   04/22/24 132 kg (291 lb)      There is no height or weight on file to calculate BMI.     Assessment/Plan   Problem List Items Addressed This Visit       Type 2 diabetes mellitus without complications    Patient's goal A1c is < 7%.  Is pt at goal? No, 8.0% (11/1/2024)  Patient's SMBGs are 90-160mg/dL, but limited  Rationale for plan: Patient tolerating Mounjaro well. Patient is not able to take her blood sugars, as she does not have money to buy testing supplies or get her A1c checked. Will continue current therapy, and follow up in 3 months.    Medication Changes:  CONTINUE:  Glimepiride 4mg 2 times a day  Jardiance 25mg daily  Actos 45mg daily  Mounjaro 12.5mg once weekly. Will send medication to [facility] for mail order.    Future Considerations:  Recommend lifestyle changes  Titrate Mounjaro as tolerated.    Diabetes Education  Rule  of 15: eating ~15 g of carbs when BG less than 80 (half cup juice, 3-4 glucose tabs).  Recognize symptoms of high and low blood sugar.   Eat a realistic healthy diet consisting of fruits, vegetables, fiber, protein food choices on a regular basis and be aware of portion/serving sizes. Reduce carbohydrate consumption and always consume with protein and fat. Avoid foods high in saturated/trans fat, high salt content, and sweets and beverages with added sugars.  Limit alcohol consumption; alcohol may affect your blood sugar and cause hypoglycemia.   Stay active and incorporate ~30 mins of exercise into your daily routine to manage your weight and increase the body's acceptance of insulin.    PATIENT GOALS   Fasting B - 130 mg/dL 2-HR Postprandial BG:   Less than 180 mg/dL A1c:   Less than 7.0 %      Mounjaro Education:   Counseled patient on MOA, expectations, side effects, duration of therapy, contraindications, administration, and monitoring parameters  Answered all patient questions and concerns  Provided detailed dosing and administration counseling to ensure proper technique.   Reviewed Mounjaro titration schedule, starting with 2.5 mg once weekly to a goal of 15 mg once weekly if tolerated  Counseled patient on the benefits of GLP-1ra glycemic control and weight loss  Reviewed storage requirements of Mounjaro when not in use, and when to administer the medication if a dose is missed.  Advised patient that they may experience improved satiety after meals and portion sizes of meals may be reduced as doses of Mounjaro increase.    Empagliflozin (Jardiance) Education:  Counseled patient on Empagliflozin (Jardiance) MOA, expectations, side effects, duration of therapy, administration, and monitoring parameters.  Reviewed the benefits of SGLT-2i therapy, such as glycemic control and kidney and CV protection.  Advised patient to practice proper  hygiene to reduce risk of UTIs or yeast infections.  Advised  patient to maintain adequate fluid intake to remain hydrated while on SGLT2i therapy.  Answered all patient questions and concerns.         Relevant Medications    tirzepatide (Mounjaro) 12.5 mg/0.5 mL pen injector    Other Relevant Orders    Referral to Clinical Pharmacy       Clinical Pharmacist follow-up: 7/16/2025 at 2:20PM, Telehealth visit    Continue all meds under the continuation of care with the referring provider and clinical pharmacy team.    Please reach out to the Clinical Pharmacy Team if there are any further questions.     Verbal consent to manage patient's drug therapy was obtained from patient. They were informed they may decline to participate or withdraw from participation in pharmacy services at any time.    Brianna Joseph, PharmD  [phone]

## 2025-04-17 PROCEDURE — RXMED WILLOW AMBULATORY MEDICATION CHARGE

## 2025-04-17 RX ORDER — TIRZEPATIDE 12.5 MG/.5ML
12.5 INJECTION, SOLUTION SUBCUTANEOUS WEEKLY
Qty: 2 ML | Refills: 3 | Status: SHIPPED | OUTPATIENT
Start: 2025-04-17

## 2025-04-17 NOTE — ASSESSMENT & PLAN NOTE
Patient's goal A1c is < 7%.  Is pt at goal? No, 8.0% (2024)  Patient's SMBGs are 90-160mg/dL, but limited  Rationale for plan: Patient tolerating Mounjaro well. Patient is not able to take her blood sugars, as she does not have money to buy testing supplies or get her A1c checked. Will continue current therapy, and follow up in 3 months.    Medication Changes:  CONTINUE:  Glimepiride 4mg 2 times a day  Jardiance 25mg daily  Actos 45mg daily  Mounjaro 12.5mg once weekly. Will send medication to [facility] for mail order.    Future Considerations:  Recommend lifestyle changes  Titrate Mounjaro as tolerated.    Diabetes Education  Rule of 15: eating ~15 g of carbs when BG less than 80 (half cup juice, 3-4 glucose tabs).  Recognize symptoms of high and low blood sugar.   Eat a realistic healthy diet consisting of fruits, vegetables, fiber, protein food choices on a regular basis and be aware of portion/serving sizes. Reduce carbohydrate consumption and always consume with protein and fat. Avoid foods high in saturated/trans fat, high salt content, and sweets and beverages with added sugars.  Limit alcohol consumption; alcohol may affect your blood sugar and cause hypoglycemia.   Stay active and incorporate ~30 mins of exercise into your daily routine to manage your weight and increase the body's acceptance of insulin.    PATIENT GOALS   Fasting B - 130 mg/dL 2-HR Postprandial BG:   Less than 180 mg/dL A1c:   Less than 7.0 %      Mounjaro Education:   Counseled patient on MOA, expectations, side effects, duration of therapy, contraindications, administration, and monitoring parameters  Answered all patient questions and concerns  Provided detailed dosing and administration counseling to ensure proper technique.   Reviewed Mounjaro titration schedule, starting with 2.5 mg once weekly to a goal of 15 mg once weekly if tolerated  Counseled patient on the benefits of GLP-1ra glycemic control and weight  loss  Reviewed storage requirements of Mounjaro when not in use, and when to administer the medication if a dose is missed.  Advised patient that they may experience improved satiety after meals and portion sizes of meals may be reduced as doses of Mounjaro increase.    Empagliflozin (Jardiance) Education:  Counseled patient on Empagliflozin (Jardiance) MOA, expectations, side effects, duration of therapy, administration, and monitoring parameters.  Reviewed the benefits of SGLT-2i therapy, such as glycemic control and kidney and CV protection.  Advised patient to practice proper  hygiene to reduce risk of UTIs or yeast infections.  Advised patient to maintain adequate fluid intake to remain hydrated while on SGLT2i therapy.  Answered all patient questions and concerns.

## 2025-04-18 ENCOUNTER — PHARMACY VISIT (OUTPATIENT)
Dept: PHARMACY | Facility: CLINIC | Age: 55
End: 2025-04-18
Payer: MEDICARE

## 2025-04-19 ENCOUNTER — PHARMACY VISIT (OUTPATIENT)
Dept: PHARMACY | Facility: CLINIC | Age: 55
End: 2025-04-19

## 2025-04-25 DIAGNOSIS — G47.00 INSOMNIA, UNSPECIFIED: ICD-10-CM

## 2025-04-25 DIAGNOSIS — K21.9 GASTRO-ESOPHAGEAL REFLUX DISEASE WITHOUT ESOPHAGITIS: ICD-10-CM

## 2025-04-25 RX ORDER — OMEPRAZOLE 20 MG/1
20 CAPSULE, DELAYED RELEASE ORAL
Qty: 30 CAPSULE | Refills: 0 | Status: SHIPPED | OUTPATIENT
Start: 2025-04-25

## 2025-04-25 RX ORDER — TRAZODONE HYDROCHLORIDE 50 MG/1
50 TABLET ORAL NIGHTLY
Qty: 30 TABLET | Refills: 0 | Status: SHIPPED | OUTPATIENT
Start: 2025-04-25

## 2025-04-25 NOTE — TELEPHONE ENCOUNTER
Recent Visits  Date Type Provider Dept   11/01/24 Office Visit Rj Nj DO [facility]   Showing recent visits within past 365 days and meeting all other requirements  Future Appointments  No visits were found meeting these conditions.  Showing future appointments within next 90 days and meeting all other requirements

## 2025-05-09 DIAGNOSIS — G60.9 IDIOPATHIC PERIPHERAL NEUROPATHY: ICD-10-CM

## 2025-05-09 DIAGNOSIS — R52 PAIN: ICD-10-CM

## 2025-05-09 NOTE — TELEPHONE ENCOUNTER
Recent Visits  Date Type Provider Dept   11/01/24 Office Visit [facility]   Showing recent visits within past 365 days and meeting all other requirements  Future Appointments  No visits were found meeting these conditions.  Showing future appointments within next 90 days and meeting all other requirements

## 2025-05-10 DIAGNOSIS — G47.00 INSOMNIA, UNSPECIFIED: ICD-10-CM

## 2025-05-10 DIAGNOSIS — K21.9 GASTRO-ESOPHAGEAL REFLUX DISEASE WITHOUT ESOPHAGITIS: ICD-10-CM

## 2025-05-10 PROCEDURE — RXMED WILLOW AMBULATORY MEDICATION CHARGE

## 2025-05-11 RX ORDER — GABAPENTIN 400 MG/1
400 CAPSULE ORAL 4 TIMES DAILY
Qty: 360 CAPSULE | Refills: 0 | Status: SHIPPED | OUTPATIENT
Start: 2025-05-11

## 2025-05-12 PROCEDURE — RXMED WILLOW AMBULATORY MEDICATION CHARGE

## 2025-05-12 RX ORDER — TRAZODONE HYDROCHLORIDE 50 MG/1
50 TABLET ORAL NIGHTLY
Qty: 90 TABLET | Refills: 0 | Status: SHIPPED | OUTPATIENT
Start: 2025-05-12

## 2025-05-12 RX ORDER — OMEPRAZOLE 20 MG/1
20 CAPSULE, DELAYED RELEASE ORAL
Qty: 90 CAPSULE | Refills: 0 | Status: SHIPPED | OUTPATIENT
Start: 2025-05-12

## 2025-05-12 NOTE — TELEPHONE ENCOUNTER
Recent Visits  Date Type Provider Dept   11/01/24 Office Visit [facility] N[facility] DO [facility]   Showing recent visits within past 365 days and meeting all other requirements  Future Appointments  No visits were found meeting these conditions.  Showing future appointments within next 90 days and meeting all other requirements

## 2025-05-13 DIAGNOSIS — R60.0 EDEMA OF EXTREMITIES: ICD-10-CM

## 2025-05-13 RX ORDER — NABUMETONE 500 MG/1
500 TABLET, FILM COATED ORAL 2 TIMES DAILY
Qty: 60 TABLET | Refills: 0 | Status: SHIPPED | OUTPATIENT
Start: 2025-05-13

## 2025-05-14 ENCOUNTER — PHARMACY VISIT (OUTPATIENT)
Dept: PHARMACY | Facility: CLINIC | Age: 55
End: 2025-05-14
Payer: MEDICARE

## 2025-05-14 DIAGNOSIS — M54.16 LEFT LUMBAR RADICULOPATHY: ICD-10-CM

## 2025-05-14 NOTE — TELEPHONE ENCOUNTER
Recent Visits  Date Type Provider Dept   11/01/24 Office Visit Rj Nj DO Do [facility]   Showing recent visits within past 365 days and meeting all other requirements  Future Appointments  No visits were found meeting these conditions.  Showing future appointments within next 90 days and meeting all other requirements

## 2025-05-16 RX ORDER — DICLOFENAC SODIUM 75 MG/1
75 TABLET, DELAYED RELEASE ORAL 2 TIMES DAILY PRN
Qty: 60 TABLET | Refills: 0 | Status: SHIPPED | OUTPATIENT
Start: 2025-05-16 | End: 2025-07-15

## 2025-05-21 DIAGNOSIS — K21.9 GASTRO-ESOPHAGEAL REFLUX DISEASE WITHOUT ESOPHAGITIS: ICD-10-CM

## 2025-05-21 RX ORDER — DICYCLOMINE HYDROCHLORIDE 10 MG/1
10 CAPSULE ORAL 4 TIMES DAILY
Qty: 120 CAPSULE | Refills: 1 | Status: SHIPPED | OUTPATIENT
Start: 2025-05-21

## 2025-06-07 PROCEDURE — RXMED WILLOW AMBULATORY MEDICATION CHARGE

## 2025-06-09 PROCEDURE — RXMED WILLOW AMBULATORY MEDICATION CHARGE

## 2025-06-11 ENCOUNTER — PHARMACY VISIT (OUTPATIENT)
Dept: PHARMACY | Facility: CLINIC | Age: 55
End: 2025-06-11
Payer: MEDICARE

## 2025-06-12 ENCOUNTER — PHARMACY VISIT (OUTPATIENT)
Dept: PHARMACY | Facility: CLINIC | Age: 55
End: 2025-06-12
Payer: MEDICARE

## 2025-06-14 DIAGNOSIS — E78.5 HYPERLIPIDEMIA, UNSPECIFIED: ICD-10-CM

## 2025-06-14 DIAGNOSIS — E11.9 TYPE 2 DIABETES MELLITUS WITHOUT COMPLICATIONS: ICD-10-CM

## 2025-06-15 DIAGNOSIS — M54.16 LEFT LUMBAR RADICULOPATHY: ICD-10-CM

## 2025-06-16 NOTE — TELEPHONE ENCOUNTER
Recent Visits  Date Type Provider Dept   11/01/24 Office Visit [facility]j Nj DO [facility]   Showing recent visits within past 365 days and meeting all other requirements  Future Appointments  No visits were found meeting these conditions.  Showing future appointments within next 90 days and meeting all other requirements

## 2025-06-16 NOTE — TELEPHONE ENCOUNTER
Recent Visits  Date Type Provider Dept   11/01/24 Office Visit Rj Nj DO Do [facility]   Showing recent visits within past 365 days and meeting all other requirements  Future Appointments  No visits were found meeting these conditions.  Showing future appointments within next 90 days and meeting all other requirements

## 2025-06-18 RX ORDER — TIZANIDINE HYDROCHLORIDE 4 MG/1
4 CAPSULE, GELATIN COATED ORAL 2 TIMES DAILY
Qty: 60 CAPSULE | Refills: 0 | Status: SHIPPED | OUTPATIENT
Start: 2025-06-18 | End: 2025-07-18

## 2025-06-18 RX ORDER — ATORVASTATIN CALCIUM 10 MG/1
10 TABLET, FILM COATED ORAL DAILY
Qty: 30 TABLET | Refills: 0 | Status: SHIPPED | OUTPATIENT
Start: 2025-06-18

## 2025-06-18 RX ORDER — PIOGLITAZONE 45 MG/1
45 TABLET ORAL DAILY
Qty: 30 TABLET | Refills: 0 | Status: SHIPPED | OUTPATIENT
Start: 2025-06-18

## 2025-06-19 DIAGNOSIS — R60.0 EDEMA OF EXTREMITIES: ICD-10-CM

## 2025-06-19 RX ORDER — NABUMETONE 500 MG/1
500 TABLET, FILM COATED ORAL 2 TIMES DAILY
Qty: 60 TABLET | Refills: 0 | Status: SHIPPED | OUTPATIENT
Start: 2025-06-19

## 2025-06-19 NOTE — TELEPHONE ENCOUNTER
Recent Visits  No visits were found meeting these conditions.  Showing recent visits within past 90 days and meeting all other requirements  Future Appointments  No visits were found meeting these conditions.  Showing future appointments within next 90 days and meeting all other requirements

## 2025-06-20 DIAGNOSIS — M54.16 LEFT LUMBAR RADICULOPATHY: ICD-10-CM

## 2025-06-21 RX ORDER — DICLOFENAC SODIUM 75 MG/1
75 TABLET, DELAYED RELEASE ORAL 2 TIMES DAILY PRN
Qty: 60 TABLET | Refills: 0 | Status: SHIPPED | OUTPATIENT
Start: 2025-06-21 | End: 2025-08-20

## 2025-07-04 PROCEDURE — RXMED WILLOW AMBULATORY MEDICATION CHARGE

## 2025-07-07 ENCOUNTER — PHARMACY VISIT (OUTPATIENT)
Dept: PHARMACY | Facility: CLINIC | Age: 55
End: 2025-07-07
Payer: MEDICARE

## 2025-07-16 ENCOUNTER — APPOINTMENT (OUTPATIENT)
Dept: PHARMACY | Facility: HOSPITAL | Age: 55
End: 2025-07-16
Payer: COMMERCIAL

## 2025-07-16 DIAGNOSIS — E11.9 TYPE 2 DIABETES MELLITUS WITHOUT COMPLICATION, WITHOUT LONG-TERM CURRENT USE OF INSULIN: Primary | ICD-10-CM

## 2025-07-16 DIAGNOSIS — E11.9 TYPE 2 DIABETES MELLITUS WITHOUT COMPLICATIONS: ICD-10-CM

## 2025-07-16 DIAGNOSIS — M54.16 LEFT LUMBAR RADICULOPATHY: ICD-10-CM

## 2025-07-16 DIAGNOSIS — E78.5 HYPERLIPIDEMIA, UNSPECIFIED: ICD-10-CM

## 2025-07-16 RX ORDER — PIOGLITAZONE 45 MG/1
45 TABLET ORAL DAILY
Qty: 30 TABLET | Refills: 0 | Status: SHIPPED | OUTPATIENT
Start: 2025-07-16

## 2025-07-16 RX ORDER — GLIMEPIRIDE 4 MG/1
4 TABLET ORAL 2 TIMES DAILY
Qty: 180 TABLET | Refills: 0 | Status: SHIPPED | OUTPATIENT
Start: 2025-07-16

## 2025-07-16 RX ORDER — ATORVASTATIN CALCIUM 10 MG/1
10 TABLET, FILM COATED ORAL DAILY
Qty: 30 TABLET | Refills: 0 | Status: SHIPPED | OUTPATIENT
Start: 2025-07-16

## 2025-07-16 RX ORDER — TIRZEPATIDE 12.5 MG/.5ML
12.5 INJECTION, SOLUTION SUBCUTANEOUS WEEKLY
Qty: 2 ML | Refills: 3 | Status: SHIPPED | OUTPATIENT
Start: 2025-07-16

## 2025-07-16 RX ORDER — TIZANIDINE HYDROCHLORIDE 4 MG/1
4 CAPSULE, GELATIN COATED ORAL 2 TIMES DAILY
Qty: 60 CAPSULE | Refills: 0 | Status: SHIPPED | OUTPATIENT
Start: 2025-07-16 | End: 2025-08-15

## 2025-07-16 NOTE — ASSESSMENT & PLAN NOTE
Patient's goal A1c is < 7%.  Is pt at goal? No, 8.0% (2024)  Patient's SMBGs are 90-160mg/dL, but limited  Rationale for plan: Patient tolerating Mounjaro well. Patient is not able to take her blood sugars, as she does not have money to buy testing supplies or get her A1c checked. Will continue current therapy, and follow up in 3 months.    Medication Changes:  CONTINUE:  Glimepiride 4mg 2 times a day  Jardiance 25mg daily  Actos 45mg daily  Mounjaro 12.5mg once weekly. Will send medication to [facility] for mail order.    Future Considerations:  Recommend lifestyle changes  Titrate Mounjaro as tolerated.    Diabetes Education  Rule of 15: eating ~15 g of carbs when BG less than 80 (half cup juice, 3-4 glucose tabs).  Recognize symptoms of high and low blood sugar.   Eat a realistic healthy diet consisting of fruits, vegetables, fiber, protein food choices on a regular basis and be aware of portion/serving sizes. Reduce carbohydrate consumption and always consume with protein and fat. Avoid foods high in saturated/trans fat, high salt content, and sweets and beverages with added sugars.  Limit alcohol consumption; alcohol may affect your blood sugar and cause hypoglycemia.   Stay active and incorporate ~30 mins of exercise into your daily routine to manage your weight and increase the body's acceptance of insulin.    PATIENT GOALS   Fasting B - 130 mg/dL 2-HR Postprandial BG:   Less than 180 mg/dL A1c:   Less than 7.0 %      Mounjaro Education:   Counseled patient on MOA, expectations, side effects, duration of therapy, contraindications, administration, and monitoring parameters  Answered all patient questions and concerns  Provided detailed dosing and administration counseling to ensure proper technique.   Reviewed Mounjaro titration schedule, starting with 2.5 mg once weekly to a goal of 15 mg once weekly if tolerated  Counseled patient on the benefits of GLP-1ra glycemic control and weight  loss  Reviewed storage requirements of Mounjaro when not in use, and when to administer the medication if a dose is missed.  Advised patient that they may experience improved satiety after meals and portion sizes of meals may be reduced as doses of Mounjaro increase.    Empagliflozin (Jardiance) Education:  Counseled patient on Empagliflozin (Jardiance) MOA, expectations, side effects, duration of therapy, administration, and monitoring parameters.  Reviewed the benefits of SGLT-2i therapy, such as glycemic control and kidney and CV protection.  Advised patient to practice proper  hygiene to reduce risk of UTIs or yeast infections.  Advised patient to maintain adequate fluid intake to remain hydrated while on SGLT2i therapy.  Answered all patient questions and concerns.

## 2025-07-16 NOTE — PROGRESS NOTES
Clinical Pharmacy Appointment    Patient ID: Yane Lang is a 55 y.o. female who presents for Diabetes.    Pt is here for Follow Up appointment.     Referring Provider: Rj Nj DO  PCP: Rj Nj DO   Last visit with PCP: 1/21/2025  Next visit with PCP: not scheduled; encouraged     Patient Assistance for Mounjaro and Jardiance approved through 11/11/2025. Will have to be renewed prior to that date to prevent lapse in coverage. Medication(s) will be received at no cost to patient from [facility].       Subjective     Interval History  Patient had a fall at work and on a CT a lung nodule was incidentally found. Patient is going to OSH to review CT scans and get more information.    HPI    DIABETES MELLITUS TYPE 2:    Diagnosed with diabetes:  8 years. Known diabetic complications: peripheral neuropathy.  Does patient follow with Endocrinology: no  Last optometry exam: none  Most recent visit in Podiatry: 2021-- patient denies sores or cuts on feet today      Current diabetic medications include:  Glimepiride 4mg twice a day  Jardiance 25mg daily  Actos 45mg daily  Mounjaro 12.5mg once weekly (Sunday)     Adverse Effects: diarrhea    Glucose Readings:  Glucometer/CGM Type: OneTouch  Current home BG readings: 90-160mg/dL    Any episodes of hypoglycemia? No, none reported.  Did patient treat episode of hypoglycemia appropriately? N/A  Does the patient have a prescription for ready-to-use Glucagon? N/a  Does pt have proteinuria? N/A, UACR of 54 5/2021    Secondary Prevention:  Statin? Yes- Lipitor 10mg take 1 tab every day  ACE-I/ARB? Yes- Lisinopril 10mg take 1 tab every day  Aspirin? No    Pertinent PMH Review:  PMH of Pancreatitis: No  PMH of Retinopathy: No  PMH of Urinary Tract Infections: No  PMH of MTC: No    Drug Interactions  No relevant drug interactions were noted.    Medication System Management  Patient's preferred pharmacy: [facility] [place]  Adherence/Organization: was  getting medications through mail order, but states she has an outstanding balance there and cannot get any meds sent out to her; prefers [facility] [place] [facility]  Affordability/Accessibility:  Patient Assistance      Objective   Allergies   Allergen Reactions    Cephalexin Itching     GI UPSET    Iodinated Contrast Media Unknown    Meperidine Unknown    Morphine Itching    Other Swelling    Oxycodone Hcl-Oxycodone-Asa Unknown    Oxycodone-Acetaminophen Unknown    Polymyxin B Other    Propoxyphene N-Acetaminophen Unknown     Social History     Social History Narrative    Not on file      Medication Review  Current Outpatient Medications   Medication Instructions    acetaminophen (Tylenol 8 HOUR) 650 mg ER tablet Take 1 tablet by mouth every 8 hours if needed for mild pain (1 - 3). Do not crush, chew, or split.    atorvastatin (LIPITOR) 10 mg, oral, Daily    Banophen 25 mg capsule TAKE 2 CAPSULES BY MOUTH EVERY 4 HOURS    blood sugar diagnostic (OneTouch Verio test strips) strip USE 1 STRIP 3 TIMES DAILY.    cholecalciferol (VITAMIN D-3) 50,000 Units, oral, Once Weekly    diclofenac (VOLTAREN) 75 mg, oral, 2 times daily PRN, Do not crush, chew, or split.    dicyclomine (BENTYL) 10 mg, oral, 4 times daily    furosemide (LASIX) 20 mg, oral, Daily    gabapentin (NEURONTIN) 400 mg, oral, 4 times daily    glimepiride (AMARYL) 4 mg, oral, 2 times daily    ibuprofen 600 mg tablet TAKE 1 TABLET BY MOUTH THREE TIMES A DAY AS NEEDED FOR PAIN    Jardiance 25 mg, oral, Daily    lancets misc 3 times daily    lidocaine (Lidoderm) 5 % patch APPLY ONE PATCH TO THE AFFECTED AREA(S) DAILY. 12 HOURS on, 12 HOURS off.    lisinopril 10 mg, oral, Daily, for blood pressure    methocarbamol (Robaxin) 500 mg tablet TAKE 1 TO 2 TABLETS BY MOUTH NIGHTLY    Mounjaro 12.5 mg, subcutaneous, Weekly    nabumetone (RELAFEN) 500 mg, oral, 2 times daily    omeprazole (PRILOSEC) 20 mg, oral, Daily before breakfast    ondansetron ODT (ZOFRAN-ODT) 4 mg, oral,  Every 6 hours PRN, DISSOLVE  ON THE TONGUE    pioglitazone (ACTOS) 45 mg, oral, Daily    tiZANidine (ZANAFLEX) 4 mg, oral, 2 times daily    traZODone (DESYREL) 50 mg, oral, Nightly      Vitals  BP Readings from Last 2 Encounters:   11/01/24 134/72   04/22/24 118/80     BMI Readings from Last 1 Encounters:   11/13/24 53.14 kg/m²      Labs  A1C  Lab Results   Component Value Date    HGBA1C 8.0 (A) 11/01/2024    HGBA1C 8.9 (H) 04/05/2024    HGBA1C 9.6 (A) 07/13/2023     BMP  Lab Results   Component Value Date    CALCIUM 9.4 04/05/2024     (L) 04/05/2024    K 4.6 04/05/2024    CO2 24 04/05/2024    CL 99 04/05/2024    BUN 18 04/05/2024    CREATININE 0.73 04/05/2024    EGFR >90 04/05/2024     LFTs  Lab Results   Component Value Date    ALT 27 04/05/2024    AST 21 04/05/2024    ALKPHOS 103 04/05/2024    BILITOT 0.6 04/05/2024     FLP  Lab Results   Component Value Date    TRIG 166 (H) 10/04/2023    CHOL 161 10/04/2023    LDLF 87 04/22/2022    LDLCALC 85 (L) 10/04/2023    HDL 43.2 10/04/2023     Urine Microalbumin  Lab Results   Component Value Date    MICROALBCREA 54.7 (H) 05/11/2021     Weight Management  Wt Readings from Last 3 Encounters:   11/13/24 136 kg (300 lb)   11/01/24 137 kg (301 lb)   04/22/24 132 kg (291 lb)      There is no height or weight on file to calculate BMI.     Assessment/Plan   Problem List Items Addressed This Visit       Type 2 diabetes mellitus without complications - Primary    Patient's goal A1c is < 7%.  Is pt at goal? No, 8.0% (11/1/2024)  Patient's SMBGs are 90-160mg/dL, but limited  Rationale for plan: Patient tolerating Mounjaro well. Patient is not able to take her blood sugars, as she does not have money to buy testing supplies or get her A1c checked. Will continue current therapy, and follow up in 3 months.    Medication Changes:  CONTINUE:  Glimepiride 4mg 2 times a day  Jardiance 25mg daily  Actos 45mg daily  Mounjaro 12.5mg once weekly. Will send medication to [facility] for mail order.    Future Considerations:  Recommend lifestyle changes  Titrate Mounjaro as tolerated.    Diabetes Education  Rule of 15: eating ~15 g of carbs when BG less than 80 (half cup juice, 3-4 glucose tabs).  Recognize symptoms of high and low blood sugar.   Eat a realistic healthy diet consisting of fruits, vegetables, fiber, protein food choices on a regular basis and be aware of portion/serving sizes. Reduce carbohydrate consumption and always consume with protein and fat. Avoid foods high in saturated/trans fat, high salt content, and sweets and beverages with added sugars.  Limit alcohol consumption; alcohol may affect your blood sugar and cause hypoglycemia.   Stay active and incorporate ~30 mins of exercise into your daily routine to manage your weight and increase the body's acceptance of insulin.    PATIENT GOALS   Fasting B - 130 mg/dL 2-HR Postprandial BG:   Less than 180 mg/dL A1c:   Less than 7.0 %      Mounjaro Education:   Counseled patient on MOA, expectations, side effects, duration of therapy, contraindications, administration, and monitoring parameters  Answered all patient questions and concerns  Provided detailed dosing and administration counseling to ensure proper technique.   Reviewed Mounjaro titration schedule, starting with 2.5 mg once weekly to a goal of 15 mg once weekly if tolerated  Counseled patient on the benefits of GLP-1ra glycemic control and weight loss  Reviewed storage requirements of Mounjaro when not in use, and when to administer the medication if a dose is missed.  Advised patient that they may experience improved satiety after meals and portion sizes of meals may be reduced as doses of Mounjaro increase.    Empagliflozin (Jardiance) Education:  Counseled patient on Empagliflozin (Jardiance) MOA, expectations, side effects, duration of therapy, administration, and monitoring parameters.  Reviewed the benefits of SGLT-2i therapy, such as glycemic  control and kidney and CV protection.  Advised patient to practice proper  hygiene to reduce risk of UTIs or yeast infections.  Advised patient to maintain adequate fluid intake to remain hydrated while on SGLT2i therapy.  Answered all patient questions and concerns.         Relevant Medications    tirzepatide (Mounjaro) 12.5 mg/0.5 mL pen injector    glimepiride (Amaryl) 4 mg tablet    Other Relevant Orders    Referral to Clinical Pharmacy     Clinical Pharmacist follow-up: 8/13/2025 at 2:00PM, Telehealth visit    Continue all meds under the continuation of care with the referring provider and clinical pharmacy team.    Please reach out to the Clinical Pharmacy Team if there are any further questions.     Verbal consent to manage patient's drug therapy was obtained from patient. They were informed they may decline to participate or withdraw from participation in pharmacy services at any time.    Thank you,  Brianna Joseph, PharmD  Clinical Pharmacist  [phone]

## 2025-07-16 NOTE — TELEPHONE ENCOUNTER
Recent Visits  Date Type Provider Dept   11/01/24 Office Visit Rj Nj DO Do [facility]   04/22/24 Office Visit Rj Nj, DO Do [facility]   Showing recent visits within past 540 days and meeting all other requirements  Future Appointments  No visits were found meeting these conditions.  Showing future appointments within next 180 days and meeting all other requirements

## 2025-07-16 NOTE — TELEPHONE ENCOUNTER
Recent Visits  Date Type Provider Dept   11/01/24 Office Visit [facility]   Showing recent visits within past 365 days and meeting all other requirements  Future Appointments  No visits were found meeting these conditions.  Showing future appointments within next 90 days and meeting all other requirements

## 2025-07-20 DIAGNOSIS — R60.0 EDEMA OF EXTREMITIES: ICD-10-CM

## 2025-07-21 DIAGNOSIS — M54.16 LEFT LUMBAR RADICULOPATHY: ICD-10-CM

## 2025-07-21 NOTE — TELEPHONE ENCOUNTER
Recent Visits  Date Type Provider Dept   11/01/24 Office Visit [facility]   Showing recent visits within past 365 days and meeting all other requirements  Future Appointments  No visits were found meeting these conditions.  Showing future appointments within next 90 days and meeting all other requirements

## 2025-07-21 NOTE — TELEPHONE ENCOUNTER
Recent Visits  Date Type Provider Dept   11/01/24 Office Visit [facility] Nj DO [facility]   Showing recent visits within past 365 days and meeting all other requirements  Future Appointments  No visits were found meeting these conditions.  Showing future appointments within next 90 days and meeting all other requirements

## 2025-07-23 RX ORDER — DICLOFENAC SODIUM 75 MG/1
75 TABLET, DELAYED RELEASE ORAL 2 TIMES DAILY PRN
Qty: 60 TABLET | Refills: 0 | Status: SHIPPED | OUTPATIENT
Start: 2025-07-23 | End: 2025-09-21

## 2025-07-23 RX ORDER — NABUMETONE 500 MG/1
500 TABLET, FILM COATED ORAL 2 TIMES DAILY
Qty: 60 TABLET | Refills: 0 | Status: SHIPPED | OUTPATIENT
Start: 2025-07-23

## 2025-07-30 PROCEDURE — RXMED WILLOW AMBULATORY MEDICATION CHARGE

## 2025-07-31 ENCOUNTER — PHARMACY VISIT (OUTPATIENT)
Dept: PHARMACY | Facility: CLINIC | Age: 55
End: 2025-07-31
Payer: MEDICARE

## 2025-08-10 DIAGNOSIS — E11.9 TYPE 2 DIABETES MELLITUS WITHOUT COMPLICATIONS: ICD-10-CM

## 2025-08-10 DIAGNOSIS — E78.5 HYPERLIPIDEMIA, UNSPECIFIED: ICD-10-CM

## 2025-08-11 RX ORDER — ATORVASTATIN CALCIUM 10 MG/1
10 TABLET, FILM COATED ORAL DAILY
Qty: 90 TABLET | Refills: 1 | Status: SHIPPED | OUTPATIENT
Start: 2025-08-11

## 2025-08-11 RX ORDER — PIOGLITAZONE 45 MG/1
45 TABLET ORAL DAILY
Qty: 90 TABLET | Refills: 1 | Status: SHIPPED | OUTPATIENT
Start: 2025-08-11

## 2025-08-13 ENCOUNTER — APPOINTMENT (OUTPATIENT)
Dept: PHARMACY | Facility: HOSPITAL | Age: 55
End: 2025-08-13
Payer: COMMERCIAL

## 2025-08-13 DIAGNOSIS — E11.9 TYPE 2 DIABETES MELLITUS WITHOUT COMPLICATION, WITHOUT LONG-TERM CURRENT USE OF INSULIN: ICD-10-CM

## 2025-08-16 DIAGNOSIS — G47.00 INSOMNIA, UNSPECIFIED: ICD-10-CM

## 2025-08-18 DIAGNOSIS — K21.9 GASTRO-ESOPHAGEAL REFLUX DISEASE WITHOUT ESOPHAGITIS: ICD-10-CM

## 2025-08-18 RX ORDER — TRAZODONE HYDROCHLORIDE 50 MG/1
50 TABLET ORAL NIGHTLY
Qty: 90 TABLET | Refills: 0 | Status: SHIPPED | OUTPATIENT
Start: 2025-08-18

## 2025-08-18 RX ORDER — OMEPRAZOLE 20 MG/1
20 CAPSULE, DELAYED RELEASE ORAL
Qty: 90 CAPSULE | Refills: 0 | Status: SHIPPED | OUTPATIENT
Start: 2025-08-18

## 2025-08-19 DIAGNOSIS — G60.9 IDIOPATHIC PERIPHERAL NEUROPATHY: ICD-10-CM

## 2025-08-19 DIAGNOSIS — R52 PAIN: ICD-10-CM

## 2025-08-21 RX ORDER — GABAPENTIN 400 MG/1
400 CAPSULE ORAL 4 TIMES DAILY
Qty: 120 CAPSULE | Refills: 0 | Status: SHIPPED | OUTPATIENT
Start: 2025-08-21

## 2025-08-27 DIAGNOSIS — I10 ESSENTIAL (PRIMARY) HYPERTENSION: ICD-10-CM

## 2025-08-27 PROCEDURE — RXMED WILLOW AMBULATORY MEDICATION CHARGE

## 2025-08-28 ENCOUNTER — PHARMACY VISIT (OUTPATIENT)
Dept: PHARMACY | Facility: CLINIC | Age: 55
End: 2025-08-28
Payer: MEDICARE

## 2025-08-30 RX ORDER — LISINOPRIL 10 MG/1
10 TABLET ORAL DAILY
Qty: 30 TABLET | Refills: 0 | Status: SHIPPED | OUTPATIENT
Start: 2025-08-30

## 2025-10-01 ENCOUNTER — APPOINTMENT (OUTPATIENT)
Dept: PHARMACY | Facility: HOSPITAL | Age: 55
End: 2025-10-01
Payer: COMMERCIAL